# Patient Record
Sex: FEMALE | Race: WHITE | NOT HISPANIC OR LATINO | Employment: FULL TIME | URBAN - METROPOLITAN AREA
[De-identification: names, ages, dates, MRNs, and addresses within clinical notes are randomized per-mention and may not be internally consistent; named-entity substitution may affect disease eponyms.]

---

## 2019-02-22 ENCOUNTER — OFFICE VISIT (OUTPATIENT)
Dept: FAMILY MEDICINE | Facility: CLINIC | Age: 39
End: 2019-02-22
Payer: COMMERCIAL

## 2019-02-22 ENCOUNTER — ANCILLARY PROCEDURE (OUTPATIENT)
Dept: GENERAL RADIOLOGY | Facility: CLINIC | Age: 39
End: 2019-02-22
Attending: INTERNAL MEDICINE
Payer: COMMERCIAL

## 2019-02-22 VITALS
TEMPERATURE: 98.1 F | HEIGHT: 65 IN | HEART RATE: 71 BPM | BODY MASS INDEX: 43.82 KG/M2 | OXYGEN SATURATION: 98 % | SYSTOLIC BLOOD PRESSURE: 122 MMHG | DIASTOLIC BLOOD PRESSURE: 78 MMHG | WEIGHT: 263 LBS

## 2019-02-22 DIAGNOSIS — M25.572 PAIN IN JOINT INVOLVING ANKLE AND FOOT, LEFT: ICD-10-CM

## 2019-02-22 DIAGNOSIS — M25.572 PAIN IN JOINT INVOLVING ANKLE AND FOOT, LEFT: Primary | ICD-10-CM

## 2019-02-22 DIAGNOSIS — S93.402A SPRAIN OF LEFT ANKLE, UNSPECIFIED LIGAMENT, INITIAL ENCOUNTER: ICD-10-CM

## 2019-02-22 PROBLEM — E66.01 MORBID OBESITY (H): Status: ACTIVE | Noted: 2019-02-22

## 2019-02-22 PROCEDURE — 73610 X-RAY EXAM OF ANKLE: CPT | Mod: LT

## 2019-02-22 PROCEDURE — 99203 OFFICE O/P NEW LOW 30 MIN: CPT | Performed by: INTERNAL MEDICINE

## 2019-02-22 ASSESSMENT — MIFFLIN-ST. JEOR: SCORE: 1860.9

## 2019-02-22 NOTE — Clinical Note
Please abstract the following data from this visit with this patient into the appropriate field in Epic:Pap smear done on this date: 09/28/16 (approximately), by this group: [facility] Care Everywhere documentation

## 2019-02-22 NOTE — PROGRESS NOTES
SUBJECTIVE:   Anders Zepeda is a 38 year old female who presents to clinic today for the following health issues:      Left ankle pains    Sprained ankle in July of 2018 and never had evaluation. Pain has gotten better than it was but would like to get this checked out.        Duration:     Since: 7/2018           Specific cause: ankle sprained    Description:      Location of pain: left ankle     Character of pain: dull     Pain radiation: none    Intensity: moderate    History:      Pain interferes with job: yes     History of similar pain problems: no     Any previous MRI or X-rays: none     Therapies tried without relief: none    Alleviating factors:      Improved by: rest    Precipitating factors:    Worsened by: walking long distance    Accompanying Signs & Symptoms: none            Current Medications:     No current outpatient medications on file.         Allergies:    No Known Allergies         Past Medical History:     Past Medical History:   Diagnosis Date     Ankle sprain          Past Surgical History:   No past surgical history on file.      Family Medical History:     Family History   Problem Relation Age of Onset     Family History Negative Mother      Family History Negative Father          Social History:     Social History     Socioeconomic History     Marital status:      Spouse name: Not on file     Number of children: Not on file     Years of education: Not on file     Highest education level: Not on file   Occupational History     Not on file   Social Needs     Financial resource strain: Not on file     Food insecurity:     Worry: Not on file     Inability: Not on file     Transportation needs:     Medical: Not on file     Non-medical: Not on file   Tobacco Use     Smoking status: Current Every Day Smoker     Packs/day: 1.00     Years: 25.00     Pack years: 25.00     Smokeless tobacco: Never Used   Substance and Sexual Activity     Alcohol use: Not on file     Drug use: Not on file  "    Sexual activity: Not on file   Lifestyle     Physical activity:     Days per week: Not on file     Minutes per session: Not on file     Stress: Not on file   Relationships     Social connections:     Talks on phone: Not on file     Gets together: Not on file     Attends [employer] service: Not on file     Active member of club or organization: Not on file     Attends meetings of clubs or organizations: Not on file     Relationship status: Not on file     Intimate partner violence:     Fear of current or ex partner: Not on file     Emotionally abused: Not on file     Physically abused: Not on file     Forced sexual activity: Not on file   Other Topics Concern     Not on file   Social History Narrative     Not on file           Review of System:     Constitutional: Negative for fever or chills  Skin: Negative for rashes  Ears/Nose/Throat: Negative for nasal congestion, sore throat  Respiratory: No shortness of breath, dyspnea on exertion, cough, or hemoptysis  Cardiovascular: Negative for chest pain  Gastrointestinal: Negative for nausea, vomiting  Genitourinary: Negative for dysuria, hematuria  Musculoskeletal: positive for mechanical left ankle pains  Neurologic: Negative for headaches  Psychiatric: Negative for depression, anxiety  Hematologic/Lymphatic/Immunologic: Negative  Endocrine: Negative  Behavioral: Negative for tobacco use       Physical Exam:   /78 (BP Location: Right arm, Patient Position: Sitting, Cuff Size: Adult Large)   Pulse 71   Temp 98.1  F (36.7  C) (Oral)   Ht 1.638 m (5' 4.5\")   Wt 119.3 kg (263 lb)   SpO2 98%   BMI 44.45 kg/m      GENERAL: alert and no distress  EYES: eyes grossly normal to inspection, and conjunctivae and sclerae normal  HENT: Normocephalic atraumatic. Nose and mouth without ulcers or lesions  NECK: supple  RESP: lungs clear to auscultation   CV: regular rate and rhythm, normal S1 S2  LYMPH: no peripheral edema   ABDOMEN: nondistended  MS: left lateral ankle " pains noted  SKIN: no suspicious lesions or rashes  NEURO: Alert & Oriented x 3.   PSYCH: mentation appears normal, affect normal        Diagnostic Test Results:     Diagnostic Test Results:  Results for orders placed or performed in visit on 01/18/11   EKG 12 LEAD   Result Value Ref Range    Ventricular Rate 61 BPM    Atrial Rate 61 BPM    NM Interval 144 ms    QRS Duration 76 ms     ms    QTc 450 ms    P Axis 22 degrees    R AXIS 24 degrees    T Axis -8 degrees    Interpretation ECG       Sinus rhythm  Nonspecific ST and T wave abnormality  Abnormal ECG  No previous ECGs available       ASSESSMENT/PLAN:     (S93.402A) Sprain of left ankle, unspecified ligament, initial encounter  (M25.572) Pain in joint involving ankle and foot, left  (primary encounter diagnosis)  Comment: left ankle pains for several months after previous ankle injury  Plan: XR Ankle Left G/E 3 Views, PODIATRY/FOOT &         ANKLE SURGERY REFERRAL, in the meantime, the patient is advised to take OTC pain medication including low dose tylenol for pain relief going forward.      Follow Up Plan:     Patient is instructed to return to Internal Medicine clinic for follow-up visit in 1 month.        Elsa Omalley MD  Internal Medicine  [facility]

## 2019-03-01 ENCOUNTER — OFFICE VISIT (OUTPATIENT)
Dept: PODIATRY | Facility: CLINIC | Age: 39
End: 2019-03-01
Payer: COMMERCIAL

## 2019-03-01 VITALS
HEIGHT: 65 IN | HEART RATE: 81 BPM | SYSTOLIC BLOOD PRESSURE: 146 MMHG | WEIGHT: 266 LBS | BODY MASS INDEX: 44.32 KG/M2 | DIASTOLIC BLOOD PRESSURE: 61 MMHG

## 2019-03-01 DIAGNOSIS — S82.62XK CLOSED DISPLACED FRACTURE OF LATERAL MALLEOLUS OF LEFT FIBULA WITH NONUNION: Primary | ICD-10-CM

## 2019-03-01 PROCEDURE — 99203 OFFICE O/P NEW LOW 30 MIN: CPT | Performed by: PODIATRIST

## 2019-03-01 ASSESSMENT — MIFFLIN-ST. JEOR: SCORE: 1874.51

## 2019-03-01 NOTE — LETTER
"    3/1/2019         RE: Anders Zepeda  [address]  [facility] [address]        Dear Colleague,    Thank you for referring your patient, Anders Zepeda, to the [facility]. Please see a copy of my visit note below.    PATIENT HISTORY:  Anders Zepeda is a 38 year old female who presents to clinic for L ankle pain, injury.  Pt reports a sprain in July 2018.  Presents in regular shoes.  2-5/10 pain.  Worse with activity, better with rest.  She is convinced it was \"just a sprain,\" but it \"never got better.\"  Smoker.    I was requested to see this patient for this issue by Dr Omalley.    Review of Systems:  Patient denies fever, chills, rash, wound, numbness, weakness, heart burn, blood in stool, chest pain with activity, calf pain when walking, shortness of breath with activity, easy bleeding/bruising, swelling of ankles, excessive thirst, fatigue, depression, anxiety.  Patient admits to stiffness, limping, chronic cough.     PAST MEDICAL HISTORY:   Past Medical History:   Diagnosis Date     Ankle sprain         PAST SURGICAL HISTORY: .No pertinent past surgical history.     MEDICATIONS: No current outpatient medications on file.     ALLERGIES:  No Known Allergies     SOCIAL HISTORY:   Social History     Socioeconomic History     Marital status:      Spouse name: Not on file     Number of children: Not on file     Years of education: Not on file     Highest education level: Not on file   Occupational History     Not on file   Social Needs     Financial resource strain: Not on file     Food insecurity:     Worry: Not on file     Inability: Not on file     Transportation needs:     Medical: Not on file     Non-medical: Not on file   Tobacco Use     Smoking status: Current Every Day Smoker     Packs/day: 1.00     Years: 25.00     Pack years: 25.00     Smokeless tobacco: Never Used   Substance and Sexual Activity     Alcohol use: Not on file     Drug use: Not on file     Sexual activity: Not on file " "  Lifestyle     Physical activity:     Days per week: Not on file     Minutes per session: Not on file     Stress: Not on file   Relationships     Social connections:     Talks on phone: Not on file     Gets together: Not on file     Attends [employer] service: Not on file     Active member of club or organization: Not on file     Attends meetings of clubs or organizations: Not on file     Relationship status: Not on file     Intimate partner violence:     Fear of current or ex partner: Not on file     Emotionally abused: Not on file     Physically abused: Not on file     Forced sexual activity: Not on file   Other Topics Concern     Not on file   Social History Narrative     Not on file        FAMILY HISTORY:   Family History   Problem Relation Age of Onset     Family History Negative Mother      Family History Negative Father         EXAM:Vitals: /61   Pulse 81   Ht 1.638 m (5' 4.5\")   Wt 120.7 kg (266 lb)   BMI 44.95 kg/m     BMI= Body mass index is 44.95 kg/m .    General appearance: Patient is alert and fully cooperative with history & exam.  No sign of distress is noted during the visit.     Psychiatric: Affect is pleasant & appropriate.  Patient appears motivated to improve health.     Respiratory: Breathing is regular & unlabored while sitting.     HEENT: Hearing is intact to spoken word.  Speech is clear.  No gross evidence of visual impairment that would impact ambulation.     Dermatologic: Skin is intact to LLE without significant lesions, rash or abrasion.  No paronychia or evidence of soft tissue infection is noted.     Vascular: DP & PT pulses are intact & regular on the L.  Mild L ankle edema.  CFT and skin temperature are normal.    Neurologic: Lower extremity sensation is intact to light touch.  No evidence of weakness or contracture in the lower extremities.  No evidence of neuropathy.     Musculoskeletal: L ankle pain with palpation of distal fibula.  No medial ankle pain.  No syndesmotic " or proximal fibular pain.  No foot pain.   Patient is ambulatory without assistive device or brace.  No gross ankle deformity noted.  No foot or ankle joint effusion is noted.    XRs of L ankle reviewed with pt.  Displaced fx of distal fibula below level of ankle joint, nonunion.     ASSESSMENT: L fibula fracture with nonunion     PLAN:  Reviewed patient's chart in epic.  Discussed condition and treatment options including pros and cons.    Pt has a clear ankle fx on XR, subacute and corresponds to age of injury.  Evidence of displacement, nonunion.    Discussed treatment options including bracing, surgical repair.  Bone stim could be considered, but gapping is significant.  Pt is interested in discussing the option of surgery.    Will refer to my colleague Dr. Chiu, who repairs these kinds of injuries.    Pt placed in Aircast boot.  WBAT.  RICE.    Pt advised to remove boot several times a day and do leg raises, knee bends. It is also recommended that a thick-soled shoe be worn on the contralateral foot to offset any created leg length issue. CAM does not have to be worn at night.  We discussed immobilization and the risk of blood clot.  Patient to seek medical attention if calf swelling and/or pain, chest pain, shortness of breath.    Smoking cessation info given.    Satya Hannah DPM, FACFAS    Weight management plan: Patient was referred to their PCP to discuss a diet and exercise plan.  Anders to follow up with Primary Care provider regarding elevated blood pressure.      Again, thank you for allowing me to participate in the care of your patient.        Sincerely,        Satya Hannah DPM

## 2019-03-01 NOTE — PROGRESS NOTES
"PATIENT HISTORY:  Anders Zepeda is a 38 year old female who presents to clinic for L ankle pain, injury.  Pt reports a sprain in July 2018.  Presents in regular shoes.  2-5/10 pain.  Worse with activity, better with rest.  She is convinced it was \"just a sprain,\" but it \"never got better.\"  Smoker.    I was requested to see this patient for this issue by Dr Omalley.    Review of Systems:  Patient denies fever, chills, rash, wound, numbness, weakness, heart burn, blood in stool, chest pain with activity, calf pain when walking, shortness of breath with activity, easy bleeding/bruising, swelling of ankles, excessive thirst, fatigue, depression, anxiety.  Patient admits to stiffness, limping, chronic cough.     PAST MEDICAL HISTORY:   Past Medical History:   Diagnosis Date     Ankle sprain         PAST SURGICAL HISTORY: .No pertinent past surgical history.     MEDICATIONS: No current outpatient medications on file.     ALLERGIES:  No Known Allergies     SOCIAL HISTORY:   Social History     Socioeconomic History     Marital status:      Spouse name: Not on file     Number of children: Not on file     Years of education: Not on file     Highest education level: Not on file   Occupational History     Not on file   Social Needs     Financial resource strain: Not on file     Food insecurity:     Worry: Not on file     Inability: Not on file     Transportation needs:     Medical: Not on file     Non-medical: Not on file   Tobacco Use     Smoking status: Current Every Day Smoker     Packs/day: 1.00     Years: 25.00     Pack years: 25.00     Smokeless tobacco: Never Used   Substance and Sexual Activity     Alcohol use: Not on file     Drug use: Not on file     Sexual activity: Not on file   Lifestyle     Physical activity:     Days per week: Not on file     Minutes per session: Not on file     Stress: Not on file   Relationships     Social connections:     Talks on phone: Not on file     Gets together: Not on file     " "Attends [employer] service: Not on file     Active member of club or organization: Not on file     Attends meetings of clubs or organizations: Not on file     Relationship status: Not on file     Intimate partner violence:     Fear of current or ex partner: Not on file     Emotionally abused: Not on file     Physically abused: Not on file     Forced sexual activity: Not on file   Other Topics Concern     Not on file   Social History Narrative     Not on file        FAMILY HISTORY:   Family History   Problem Relation Age of Onset     Family History Negative Mother      Family History Negative Father         EXAM:Vitals: /61   Pulse 81   Ht 1.638 m (5' 4.5\")   Wt 120.7 kg (266 lb)   BMI 44.95 kg/m    BMI= Body mass index is 44.95 kg/m .    General appearance: Patient is alert and fully cooperative with history & exam.  No sign of distress is noted during the visit.     Psychiatric: Affect is pleasant & appropriate.  Patient appears motivated to improve health.     Respiratory: Breathing is regular & unlabored while sitting.     HEENT: Hearing is intact to spoken word.  Speech is clear.  No gross evidence of visual impairment that would impact ambulation.     Dermatologic: Skin is intact to LLE without significant lesions, rash or abrasion.  No paronychia or evidence of soft tissue infection is noted.     Vascular: DP & PT pulses are intact & regular on the L.  Mild L ankle edema.  CFT and skin temperature are normal.    Neurologic: Lower extremity sensation is intact to light touch.  No evidence of weakness or contracture in the lower extremities.  No evidence of neuropathy.     Musculoskeletal: L ankle pain with palpation of distal fibula.  No medial ankle pain.  No syndesmotic or proximal fibular pain.  No foot pain.   Patient is ambulatory without assistive device or brace.  No gross ankle deformity noted.  No foot or ankle joint effusion is noted.    XRs of L ankle reviewed with pt.  Displaced fx of " distal fibula below level of ankle joint, nonunion.     ASSESSMENT: L fibula fracture with nonunion     PLAN:  Reviewed patient's chart in epic.  Discussed condition and treatment options including pros and cons.    Pt has a clear ankle fx on XR, subacute and corresponds to age of injury.  Evidence of displacement, nonunion.    Discussed treatment options including bracing, surgical repair.  Bone stim could be considered, but gapping is significant.  Pt is interested in discussing the option of surgery.    Will refer to my colleague Dr. Chiu, who repairs these kinds of injuries.    Pt placed in Aircast boot.  WBAT.  RICE.    Pt advised to remove boot several times a day and do leg raises, knee bends. It is also recommended that a thick-soled shoe be worn on the contralateral foot to offset any created leg length issue. CAM does not have to be worn at night.  We discussed immobilization and the risk of blood clot.  Patient to seek medical attention if calf swelling and/or pain, chest pain, shortness of breath.    Smoking cessation info given.    Satya Hannah DPM, FACFAS    Weight management plan: Patient was referred to their PCP to discuss a diet and exercise plan.  Anders to follow up with Primary Care provider regarding elevated blood pressure.

## 2019-03-01 NOTE — Clinical Note
Linus,This pt should be seeing you this week for L distal fibula nonunion.  She would like to discuss surgery.Thank you,-Ag

## 2019-03-01 NOTE — PATIENT INSTRUCTIONS
Follow up with Dr. Chiu next Thursday at [facility]      ANKLE FRACTURES  A fracture is a partial or complete break in a bone. Fractures in the ankle can range from the less serious avulsion injuries (small pieces of bone that have been pulled off) to severe shattering-type breaks of the tibia, fibula or both.  Ankle fractures are common injuries most often caused by the ankle rolling inward or outward. Many people mistake an ankle fracture for an ankle sprain, but they are quite different and therefore require an accurate and early diagnosis. They sometimes occur simultaneously.    SYMPTOMS  An ankle fracture is accompanied by one or all of these symptoms:  Pain at the site of the fracture, which in some cases can extend from the foot to the knee.   Significant swelling, which may occur along the length of the leg or may be more localized.   Blisters may occur over the fracture site. These should be promptly treated by a foot and ankle surgeon.   Bruising that develops soon after the injury.   Inability to walk; however, it is possible to walk with less severe breaks, so never rely on walking as a test of whether or not a bone has been fractured.   Change in the appearance of the ankle--it will look different from the other ankle.   Bone protruding through the skin--a sign that immediate care is needed. Fractures that ewing the skin require immediate attention because they can lead to severe infection and prolonged recovery     DIAGNOSIS  Following an ankle injury, it is important to have the ankle evaluated by a foot and ankle surgeon for proper diagnosis and treatment. If you are unable to do so right away, go to the emergency room and then follow up with a foot and ankle surgeon as soon as possible for a more thorough assessment.  The affected limb will be examined by the foot and ankle surgeon who will touch specific areas to evaluate the injury. In addition, the surgeon may order x-rays and other imaging  studies, as necessary.    NONSURGICAL TREATMENT  Treatment of ankle fractures depends on the type and severity of the injury. At first, the foot and ankle surgeon will want you to follow the RICE protocol:  Rest: Stay off the injured ankle. Walking may cause further injury.   Ice: Apply an ice pack to the injured area, placing a thin towel between the ice and the skin. Use ice for 20 minutes and then wait at least 40 minutes before icing again.   Compression: An elastic wrap should be used to control swelling.   Elevation: The ankle should be raised slightly above the level of your heart to reduce swelling.    Additional treatment options include:  Immobilization. Certain fractures are treated by protecting and restricting the ankle and foot in a cast or splint. This allows the bone to heal.   Prescription medications. To help relieve the pain, the surgeon may prescribe pain medications or anti-inflammatory drugs.    SURGICAL TREATMENT  For some ankle fractures, surgery is needed to repair the fracture and other soft tissue-related injuries, if present. The foot and ankle surgeon will select the procedure that is appropriate for your injury.    FOLLOW-UP  It is important to follow your surgeon s instructions after treatment. Failure to do so can lead to infection, deformity, arthritis and chronic pain.      BLOOD CLOT PREVENTION - WEARING A CAST BOOT    Do not drive with the CAM boot  Do not wear during long-distance travel: long car rides / plane trips  Wear the boot when moving, regardless of your weight-bearing status    Any brace that extends up to the knee can increase your chance of developing a blood clot. Blood clots generally occur in the large veins of your calf, thigh, or abdomen. A blood clot may form when blood is stagnant in the veins while your leg is immobilized in the brace. Malini and relaxing the calf muscles by moving the ankle is the best method to avoid stagnant blood. Clarify with your  doctor if you should be doing this as this may not be recommended for certain tendon surgeries.    Blood clots or deep venous thrombosis (DVT) can be life threatening if a portion of the clot breaks away and travels through the veins to your lungs. This is called a pulmonary embolism (PE) which can result in death.    Symptoms of a DVT may include swelling, tenderness, a warm feeling or redness in the leg. DVT can occur in both legs, even if only one side is in a brace. If you have these symptoms, you should call your doctor immediately or go to the Emergency Room to have your leg scanned.     Symptoms of a pulmonary embolism (PE) include chest pain, shortness of breath or the need to breath rapidly that is not associated with exercise, difficulty breathing, rapid heart rate, coughing or coughing up blood, or a feeling of passing out. Chest pain can range from mild to knife-like pain while taking a deep breath. Pulmonary embolism can occur without any symptoms whatsoever. You need to be evaluated in a hospital emergency room immediately if you have symptoms of a PE. Please call 911 as PE is a life-threatening emergency.    Be certain that you understand how much ankle range of motion is allowed. Your foot and ankle problem is being immobilized by the brace, yet we do not want you to develop a blood clot. The velcro strap braces are intended to be removed for periodic exercise of the ankle. Your doctor will tell you if it is okay to do this depending on the type of surgery or injury you had.    Your own personal risk of developing a DVT or PE is dependent on many factors. A personal or family history of previous blood clot or a clotting disorder (such as thrombophilia) puts you at risk. Other risk factors include history of cancer, current use of estrogen medications (such as birth control pills or post menopausal medications), recent trauma or fracture, diabetes, recent heart attack, COPD, heart failure, pregnancy,  obesity, advanced age, smoking, etc. Please make sure your doctor is aware if you have any of these risk factors.        Body Mass Index (BMI)  Many things can cause foot and ankle problems. Foot structure, activity level, foot mechanics and injuries are common causes of pain.  One very important issue that often goes unmentioned is body weight. Extra weight can cause increased stress on muscles, ligaments, bones and tendons. Sometimes just a few extra pounds is all it takes to put one over her/his threshold. Without reducing that stress, it can be difficult to alleviate pain.   Some people are uncomfortable addressing this issue, but we feel it is important for you to think about it. As Foot & Ankle specialists, our job is addressing the lower extremity problem and possible causes.   Regarding extra body weight, we encourage patients to discuss diet and weight management plans with their primary care doctors. It is this team approach that gives you the best opportunity for pain relief and getting you back on your feet.     Anders to follow up with Primary Care provider regarding elevated blood pressure.      SMOKING CESSATION  What's in cigarette smoke? - Cigarette smoke contains over 4,000 chemicals. Nicotine is one of the main ingredients which is an insecticide/herbicide. It is poisonous to our nervous system, increases blood clotting risk, and decreases the body's defenses to fight off infection. Another chemical is Carbon Monoxide is an asphyxiating gas that permanently binds to blood cells and blocks the transport of oxygen. This leads to tissue death and decreases your metabolism. Tar is a chemical that coats your lungs and trachea which impairs new oxygen coming in and carbon dioxide getting out of your body.   How does smoking impact surgery? - Smoking is particularly hazardous with regards to surgery. Surgery puts stress on the body and a smoker's body is already under strain from these chemicals.  Putting the two together, especially for an elective surgery, could be a recipe for disaster. Smoking before and after surgery increases your risk of heart problems, slow wound healing, delayed bone healing, blood clots, wound infection and anesthesia complications.   What are the benefits of quitting? - In 20 minutes your blood pressure will drop back down to normal. In 8 hours the carbon monoxide (a toxic gas) levels in your blood stream will drop by half, and oxygen levels will return to normal. In 48 hours your chance of having a heart attack will have decreased. All nicotine will have left your body. Your sense of taste and smell will return to a normal level. In 72 hours your bronchial tubes will relax, and your energy levels will increase. In 2 weeks your circulation will increase, and it will continue to improve for the next 10 weeks.    Recommendations for elective surgery - Ideally, patients should quit smoking 8 weeks before and at least 2 weeks after elective surgery in order to avoid complications. Simply cutting back on the amount of cigarettes smoked per day does not offer any benefit or decrease the risk of poor wound healing, heart problems, and infection. Smokers should also start taking Vitamin C and B for two weeks before surgery and two weeks after surgery.    Ways to Stop Smokin. Nicotine patches, lozenges, or gum  2. Support Groups  3. Medications (see below)    List of Medications:  1. Varenicline Tartrate (CHANTIX)   2. Bupropion HCL (WELLBUTRIN, ZYBAN) - note: make sure Wellbutrin is for smoking cessation and not other issues   3. Nicotine Patch (NICODERM)   4. Nicotine Inhaler (NICOTROL)   5. Nicotine Gum Nicotine Polacrilex   6. Nicotine Lozenge: Nicotine Polacrilex (COMMIT)   * [place] offers a smoking support group as well!  Please visit: [email or web address]  If you are interested in these, ask about getting a prescription or talk to your primary care doctor  about what may be the best way for you to quit.            Statement Selected

## 2019-03-07 ENCOUNTER — TELEPHONE (OUTPATIENT)
Dept: PODIATRY | Facility: CLINIC | Age: 39
End: 2019-03-07

## 2019-03-07 ENCOUNTER — ANCILLARY PROCEDURE (OUTPATIENT)
Dept: GENERAL RADIOLOGY | Facility: CLINIC | Age: 39
End: 2019-03-07
Attending: PODIATRIST
Payer: COMMERCIAL

## 2019-03-07 ENCOUNTER — OFFICE VISIT (OUTPATIENT)
Dept: PODIATRY | Facility: CLINIC | Age: 39
End: 2019-03-07
Payer: COMMERCIAL

## 2019-03-07 VITALS
SYSTOLIC BLOOD PRESSURE: 128 MMHG | HEIGHT: 65 IN | WEIGHT: 266 LBS | DIASTOLIC BLOOD PRESSURE: 76 MMHG | BODY MASS INDEX: 44.32 KG/M2

## 2019-03-07 DIAGNOSIS — S82.892K CLOSED FRACTURE OF LEFT ANKLE WITH NONUNION, SUBSEQUENT ENCOUNTER: Primary | ICD-10-CM

## 2019-03-07 DIAGNOSIS — S82.62XK CLOSED DISPLACED FRACTURE OF LATERAL MALLEOLUS OF LEFT FIBULA WITH NONUNION, SUBSEQUENT ENCOUNTER: Primary | ICD-10-CM

## 2019-03-07 DIAGNOSIS — S82.892K CLOSED FRACTURE OF LEFT ANKLE WITH NONUNION, SUBSEQUENT ENCOUNTER: ICD-10-CM

## 2019-03-07 PROCEDURE — 36415 COLL VENOUS BLD VENIPUNCTURE: CPT | Performed by: PODIATRIST

## 2019-03-07 PROCEDURE — 82306 VITAMIN D 25 HYDROXY: CPT | Performed by: PODIATRIST

## 2019-03-07 PROCEDURE — 99214 OFFICE O/P EST MOD 30 MIN: CPT | Performed by: PODIATRIST

## 2019-03-07 PROCEDURE — 73610 X-RAY EXAM OF ANKLE: CPT | Mod: RT

## 2019-03-07 ASSESSMENT — MIFFLIN-ST. JEOR: SCORE: 1874.51

## 2019-03-07 NOTE — PROGRESS NOTES
"Foot & Ankle Surgery   March 7, 2019    S:  Pt is seen today for evaluation of left ankle pain 2/2 to distal fibular fracture nonunion.  Original injury July 2018, stepped off a curb but doesn't recall a specific sprain/injury.  .xrays with Dr Omalley 2/22/19 showed distal fibular fracture nonunion and she was seen by Dr Hannah 3/1/19, who referred her to us for surgical intervention.  She's wearing the walking boot and has no pain but outside the boot continues to have pain.  She also describes feelings of instability in the ankle.  Smoker x 20+ years, 1 ppd.      Vitals:    03/07/19 0848   BP: 128/76   Weight: 120.7 kg (266 lb)   Height: 1.638 m (5' 4.5\")   '      ROS - Pos for CC.  Patient denies current nausea, vomiting, chills, fevers, belly pain, calf pain, chest pain or SOB.  Complete remainder of ROS it otherwise neg.      PE:  Gen:   No apparent distress  Eye:    Visual scanning without deficit  Ear:    Response to auditory stimuli wnl  Lung:    Non-labored breathing on RA noted  Abd:    NTND per patient report  Lymph:    Neg for pitting/non-pitting edema BLE  Vasc:    Pulses palpable, CFT minimally delayed  Neuro:    Light touch sensation intact to all sensory nerve distributions without paresthesias  Derm:    Neg for nodules, lesions or ulcerations  MSK:    Left ankle - very tender medial and anterior gutters.  No POP ATFL or pain/instabiilty with anterior drawer.  She does have some discomfort at CFL but no pain/instability with stressing.  Points to course of peroneal tendons posterior to fibula as another area of pain.  Tender at distal fibula..  Calf:    Neg for redness, swelling or tenderness      Imaging:  xrays L ankle 2/22/19 - IMPRESSION: Distracted fracture is noted through the distal fibula,  transverse, minimally displaced. This is just below the level of the  ankle mortise with degree of distraction measuring up to 6 mm. No  interval healing. Margins appear fairly well corticated " indicating  chronicity, given the clinical history, may relate to an injury in  July 2018. Ankle mortise appears intact. Mild lateral soft tissue  swelling.    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  Continued left ankle pain associated with distal fibular fracture nonunion from July 2018  -personally reviewed imaging  -xrays R ankle to assess anatomy  -MRI left ankle to assess ligaments and peroneal tendons  -discussed ankle arthroscopy to assess  Cause of intra-articular pain and to clean out joint  -discussed ORIF fibula with grafting  -consider peroneal exploration if MRI indicates  -Vit D draw today  -surg naveen handout dispensed    Follow up:  Prn for surg consult or sooner with acute issues    Job duties; time off work - ; 2 weeks.  Has seated duties  Smoking history - 1 ppd x 20+ years; advised to speak with PCP about  Vit D - draw today  Diabetic/A1c - neg  Hemoglobin - draw prior to surgery  Clot history - neg  Allergies to surgical implants/suture - neg  Allergies/issues with narcotics - neg    Procedure(s):  1.  Left ankle arthroscopy, distal fibular fracture nonunion repair and possible ligament/tendon repair  Consent: above  Diagnosis:  Distal fibular fracture nonunion  Equipment/Vendor: Arthrex ankle scope and distal fibular plate set.  PRP spin-down system.                    Body mass index is 44.95 kg/m .           Linus Chiu DPM FACFAS FACFAOM  Podiatric Foot & Ankle Surgeon  [facility]  [phone]

## 2019-03-07 NOTE — PATIENT INSTRUCTIONS
"Thank you for choosing [facility] / [facility]!    DR. LAWS'S CLINIC LOCATIONS:   MONDAY - [place] TUESDAY - [place]   [address] [facility]  [address]   [place] [address] [place] [address]   [phone]       THURSDAY AM - [place] THURSDAY PM - [place]   [address]   [place] [address] [place] [address]   [phone]       FRIDAY AM - [place] SET UP SURGERY: [phone]   [address] APPOINTMENTS: [phone]   [place] [address] BILLING QUESTIONS: [phone] FAX NUMBER: [phone]     Follow Up: as needed        Surgical planning.  If you have decided to have surgery, follow these few steps to get the procedure scheduled and to have the proper paperwork filled out.  If you are unsure about surgery, or would like to sit down and further discuss your issue and treatment options, please make a clinic appointment with Dr Laws.    1.  Pick the date that you would like to have surgery.  Keep in mind that you will likely need at least 2 weeks off after the procedure for proper rest and healing.    2.  Call the surgery scheduling line at [phone] to get the procedure scheduled.  My , Paulina, will assist you in getting surgery set up.      3.  Make an appointment to see Dr Laws within 1-2 weeks of the date of surgery for your pre-operative consult.  When making the appointment, say \"I need to make a 30 minute surgical consult with Dr Laws\".  All the paperwork will be ready for you during the visit.  It is recommended that you bring a spouse, family member or friend with you.  There will be lots of information presented.  It can be overwhelming, and it is better to have someone there to help sort out the details.    4.  Make an appointment to see your Primary Care Physician within 4 weeks of the date of surgery for your \"Pre-operative History and " "Physical\".  This is done to make sure you are medically healthy to undergo surgery.        If you have any post-operative questions regarding your procedure, call our triage nurses at [phone].                          FYI: Body Mass Index (BMI)  Many things can cause foot and ankle problems. Foot structure, activity level, foot mechanics and injuries are common causes of pain. One very important issue that often goes unmentioned, is body weight. Extra weight can cause increased stress on muscles, ligaments, bones and tendons. Sometimes just a few extra pounds is all it takes to put one over her/his threshold. Without reducing that stress, it can be difficult to alleviate pain. Some people are uncomfortable addressing this issue, but we feel it is important for you to think about it. As Foot &  Ankle specialists, our job is addressing the lower extremity problem and possible causes. Regarding extra body weight, we encourage patients to discuss diet and weight management plans with their primary care doctors. It is this team approach that gives you the best opportunity for pain relief and getting you back on your feet.      SMOKING CESSATION  What's in cigarette smoke? - Cigarette smoke contains over 4,000 chemicals. Nicotine is one of the main ingredients which is an insecticide/herbicide. It is poisonous to our nervous system, increases blood clotting risk, and decreases the body's defenses to fight off infection. Another chemical is Carbon Monoxide is an asphyxiating gas that permanently binds to blood cells and blocks the transport of oxygen. This leads to tissue death and decreases your metabolism. Tar is a chemical that coats your lungs and trachea which impairs new oxygen coming in and carbon dioxide getting out of your body.   How does smoking impact surgery? - Smoking is particularly hazardous with regards to surgery. Surgery puts stress on the body and a smoker's body is already under strain from these " chemicals. Putting the two together, especially for an elective surgery, could be a recipe for disaster. Smoking before and after surgery increases your risk of heart problems, slow wound healing, delayed bone healing, blood clots, wound infection and anesthesia complications.   What are the benefits of quitting? - In 20 minutes your blood pressure will drop back down to normal. In 8 hours the carbon monoxide (a toxic gas) levels in your blood stream will drop by half, and oxygen levels will return to normal. In 48 hours your chance of having a heart attack will have decreased. All nicotine will have left your body. Your sense of taste and smell will return to a normal level. In 72 hours your bronchial tubes will relax, and your energy levels will increase. In 2 weeks your circulation will increase, and it will continue to improve for the next 10 weeks.    Recommendations for elective surgery - Ideally, patients should quit smoking 8 weeks before and at least 2 weeks after elective surgery in order to avoid complications. Simply cutting back on the amount of cigarettes smoked per day does not offer any benefit or decrease the risk of poor wound healing, heart problems, and infection. Smokers should also start taking Vitamin C and B for two weeks before surgery and two weeks after surgery.    Ways to Stop Smokin. Nicotine patches, lozenges, or gum  2. Support Groups  3. Medications (see below)    List of Medications:  1. Varenicline Tartrate (CHANTIX)   2. Bupropion HCL (WELLBUTRIN, ZYBAN) - note: make sure Wellbutrin is for smoking cessation and not other issues   3. Nicotine Patch (NICODERM)   4. Nicotine Inhaler (NICOTROL)   5. Nicotine Gum Nicotine Polacrilex   6. Nicotine Lozenge: Nicotine Polacrilex (COMMIT)   * [place] offers a smoking support group as well!  Please visit: [email or web address]  If you are interested in these, ask about getting a prescription or talk to your primary  care doctor about what may be the best way for you to quit.

## 2019-03-07 NOTE — TELEPHONE ENCOUNTER
"Orders placed for \"left ankle arthroscopy, open reduction internal fixation distal fibular fracture nonunion, with possible tendon/ligament repair\".    Linus Chiu DPM FACFAS FACFAOM  Podiatric Foot & Ankle Surgeon  [facility]  [phone]      "

## 2019-03-07 NOTE — TELEPHONE ENCOUNTER
----- Message from Paulina Rivera sent at 3/7/2019 11:29 AM CST -----  Regarding: surgical order  Dr. Chiu,     Patient called and is ready to schedule surgery.  We are looking at 4/17 or 4/24.      Please place surgical order for the left ankle.    Paulina

## 2019-03-08 ENCOUNTER — TELEPHONE (OUTPATIENT)
Dept: PODIATRY | Facility: CLINIC | Age: 39
End: 2019-03-08

## 2019-03-08 LAB — DEPRECATED CALCIDIOL+CALCIFEROL SERPL-MC: 6 UG/L (ref 20–75)

## 2019-03-08 NOTE — TELEPHONE ENCOUNTER
Scheduled surgery.     Type of surgery: Left ORIF distal fibular fracture nonunion, with possible tendon/ligament repair, CPT 70542 and 38437  Location of surgery: [facility]  Date and time of surgery: 4/17/19 @ 1130am  Surgeon: Aram  Pre-Op Appt Date: 4/4/19, Dr. Omalley  Post-Op Appt Date: 4/25/19   Packet sent out: Yes  Pre-cert/Authorization completed:  Not Applicable  Date: 3/8/19      Paulina Rivera, Surgery Scheduler

## 2019-03-11 ENCOUNTER — TELEPHONE (OUTPATIENT)
Dept: PODIATRY | Facility: CLINIC | Age: 39
End: 2019-03-11

## 2019-03-11 NOTE — TELEPHONE ENCOUNTER
----- Message from Paulina Rivera sent at 3/8/2019  2:14 PM CST -----  Regarding: MRI order?  Linus,     Patient called asking when someone will contact her to schedule the MRI.  I didn't see an order, so I'm ordering if 1) you forgot to sign the order or 2) you didn't get a chance to put in the order yet.     I notified the patient that someone from Radiology/Imaging will contact her once the order is placed.      Her surgery is not until April 17th. Will that be ok with her type of surgery?  Your schedule is full.    Paulina

## 2019-03-13 ENCOUNTER — TELEPHONE (OUTPATIENT)
Dept: PODIATRY | Facility: CLINIC | Age: 39
End: 2019-03-13

## 2019-03-13 NOTE — TELEPHONE ENCOUNTER
Reason for Call:  MRI    Detailed comments: says she has not heard from anyone about getting her scheduled for a MRI  And her Vitamin D was very low per the test results  asking what you want to do about that    Phone Number Patient can be reached at: Home number on file [phone] (home)    Best Time: anytime    Can we leave a detailed message on this number? YES    Call taken on 3/13/2019 at 11:45 AM by Cipriano Cancino

## 2019-03-13 NOTE — TELEPHONE ENCOUNTER
Called and gave contact info below to schedule.   [facility]:       [phone]      [address] [place] [address]

## 2019-03-15 ENCOUNTER — TELEPHONE (OUTPATIENT)
Dept: PODIATRY | Facility: CLINIC | Age: 39
End: 2019-03-15

## 2019-03-15 NOTE — TELEPHONE ENCOUNTER
Reason for Call:  Form, our goal is to have forms completed with 72 hours, however, some forms may require a visit or additional information.    Type of letter, form or note:  FMLA    Who is the form from?: Patient    Where did the form come from: Patient or family brought in       What clinic location was the form placed at?: [facility]    Where the form was placed: in 's In Box      What number is listed as a contact on the form?: [phone]       Additional comments: none    Call taken on 3/15/2019 at 3:14 PM by Cipriano Cancino

## 2019-03-20 NOTE — TELEPHONE ENCOUNTER
"Pt has questions regarding getting a new walking boot due to work policy regarding \"open toed boots\"    "

## 2019-03-21 NOTE — TELEPHONE ENCOUNTER
Informed pt we will fax ther [place] paperwork tomorrow AM and to look online or at a local medical supply store for a tall closed toe walking boot as we do not have any through [facility].

## 2019-03-22 ENCOUNTER — HOSPITAL ENCOUNTER (OUTPATIENT)
Dept: MRI IMAGING | Facility: CLINIC | Age: 39
Discharge: HOME | End: 2019-03-22
Attending: PODIATRIST | Admitting: PODIATRIST
Payer: COMMERCIAL

## 2019-03-22 DIAGNOSIS — S82.892K CLOSED FRACTURE OF LEFT ANKLE WITH NONUNION, SUBSEQUENT ENCOUNTER: ICD-10-CM

## 2019-03-22 PROCEDURE — 73721 MRI JNT OF LWR EXTRE W/O DYE: CPT | Mod: LT

## 2019-03-22 NOTE — TELEPHONE ENCOUNTER
Forms signed, will fax today.    Lin[facility]iu DPM [facility] FACFA  Podiatric Foot & Ankle Surgeon  [facility]  [phone]

## 2019-04-04 ENCOUNTER — OFFICE VISIT (OUTPATIENT)
Dept: FAMILY MEDICINE | Facility: CLINIC | Age: 39
End: 2019-04-04
Payer: COMMERCIAL

## 2019-04-04 ENCOUNTER — OFFICE VISIT (OUTPATIENT)
Dept: PODIATRY | Facility: CLINIC | Age: 39
End: 2019-04-04
Payer: COMMERCIAL

## 2019-04-04 VITALS
OXYGEN SATURATION: 98 % | HEIGHT: 65 IN | WEIGHT: 257.7 LBS | HEART RATE: 74 BPM | DIASTOLIC BLOOD PRESSURE: 87 MMHG | BODY MASS INDEX: 42.93 KG/M2 | SYSTOLIC BLOOD PRESSURE: 144 MMHG | TEMPERATURE: 97.4 F

## 2019-04-04 VITALS
HEIGHT: 65 IN | BODY MASS INDEX: 44.32 KG/M2 | WEIGHT: 266 LBS | DIASTOLIC BLOOD PRESSURE: 76 MMHG | SYSTOLIC BLOOD PRESSURE: 128 MMHG

## 2019-04-04 DIAGNOSIS — Z98.890 S/P FOOT SURGERY, LEFT: Primary | ICD-10-CM

## 2019-04-04 DIAGNOSIS — F17.200 TOBACCO DEPENDENCE SYNDROME: ICD-10-CM

## 2019-04-04 DIAGNOSIS — Z01.818 PREOP GENERAL PHYSICAL EXAM: Primary | ICD-10-CM

## 2019-04-04 DIAGNOSIS — S82.832S CLOSED FRACTURE OF DISTAL END OF LEFT FIBULA, UNSPECIFIED FRACTURE MORPHOLOGY, SEQUELA: ICD-10-CM

## 2019-04-04 LAB
HGB BLD-MCNC: 10.3 G/DL (ref 11.7–15.7)
PLATELET # BLD AUTO: 319 10E9/L (ref 150–450)

## 2019-04-04 PROCEDURE — 85018 HEMOGLOBIN: CPT | Performed by: INTERNAL MEDICINE

## 2019-04-04 PROCEDURE — 99214 OFFICE O/P EST MOD 30 MIN: CPT | Performed by: PODIATRIST

## 2019-04-04 PROCEDURE — 36415 COLL VENOUS BLD VENIPUNCTURE: CPT | Performed by: INTERNAL MEDICINE

## 2019-04-04 PROCEDURE — 85049 AUTOMATED PLATELET COUNT: CPT | Performed by: INTERNAL MEDICINE

## 2019-04-04 PROCEDURE — 99214 OFFICE O/P EST MOD 30 MIN: CPT | Performed by: INTERNAL MEDICINE

## 2019-04-04 RX ORDER — NICOTINE 21 MG/24HR
1 PATCH, TRANSDERMAL 24 HOURS TRANSDERMAL EVERY 24 HOURS
Qty: 30 PATCH | Refills: 11 | Status: SHIPPED | OUTPATIENT
Start: 2019-04-04 | End: 2019-08-08

## 2019-04-04 RX ORDER — POLYETHYLENE GLYCOL 3350 17 G
2 POWDER IN PACKET (EA) ORAL
Qty: 108 LOZENGE | Refills: 11 | Status: SHIPPED | OUTPATIENT
Start: 2019-04-04 | End: 2019-08-08

## 2019-04-04 ASSESSMENT — MIFFLIN-ST. JEOR
SCORE: 1831.86
SCORE: 1869.51

## 2019-04-04 NOTE — Clinical Note
Surjit Mahajan saw Veronica today for consultation regarding her ankle pain.  We reviewed the surgery,including ankle arthroscopy, fibular fracture nonunion repair, peroneal tendon evaluation/repair and repair of the calcaneofibular ligament.  We reviewed the procedure, post-op course and risks, and she was given a copy of her post-op orders.Mark

## 2019-04-04 NOTE — PATIENT INSTRUCTIONS
Thank you for choosing [facility]!    DR. LAWS'S CLINIC LOCATIONS:   MONDAY - [place] TUESDAY - [place]   [address] [facility]  [address]   [place] [address] [place] [address]   [phone]       THURSDAY AM - [place] THURSDAY PM - [place]   [address]   [place] [address] [place] [address]   [phone]       FRIDAY AM - [place] SET UP SURGERY: [phone] [address] APPOINTMENTS: [phone]   [place] [address] BILLING QUESTIONS: [phone] FAX NUMBER: [phone]         FOOT & ANKLE SURGICAL RISKS  Undergoing surgical procedure involves a certain amount of risks. Risks of complications vary, depending on the complexity of the surgery and how you take care of the surgical area during the healing process. Complications can range from minor infection to death. Some complications are temporary while others will be permanent. Your surgeon weighs the risk of complications versus the potential benefit of undergoing the procedure. You need to consider your tolerance for unexpected problems as you decide whether to undergo surgery.    Foot and ankle surgery involves cutting skin, bone, ligaments, blood vessels, and joints. These structures heal well but not without consequence. Any break in the skin can lead to infection. A deep infection can involve bone or joints, which can be devastating. Deep infection can lead to further surgeries such as amputation or could spread to other parts of the body. Most infections are minor and easily treated with oral antibiotics. Infections are often times from bacteria already present on your skin. Proper care of the surgical site is an essential component of avoiding infection. Do not get the bandage wet and take proper care of external pins to avoid these complications.    Joint stiffness is inherent to any foot or ankle  surgery. Joint surgery is a major component of reconstructive foot and ankle procedures. The ligaments and tissue around the joint are released for exposure and/or correction of alignment. Scar tissue limits joint mobility. This can lead to hypersensitivity to touch, pain, problems wearing shoes, and need for revision surgery.    Bones do not always heal after surgery. Poor healing after a bone cut of joint fusion can lead to an extended period of casting, bone stimulators, or repeat surgery. A surgical nonunion is when bones do not heal properly. Smoking will almost always increase your risks of having postoperative complications. So if you smoke, quit now.    Bone grafting is sometimes necessary during the original or repeat surgery. Bone is sometimes taken from other parts of the body, freeze-dried cadaveric bone, or synthetic bone grafts may be used as needed.    BLOOD CLOT PREVENTION & EDUCATION  Foot and ankle surgery can lead to blood clots in the large veins of your legs. This is called a deep venous thrombosis or DVT. A DVT can be life threatening if a portion of the clot breaks away and travels to the lungs. A clot in the lungs is called a pulmonary embolism or PE.    Your risk of developing a DVT is dependent on many factors. Risk factors associated with surgery include the type of surgery you are having (foot and ankle surgery is considered a much lower risk that knee, hip, or abdominal surgery), how long you are in a cast/boot, restricted ambulation, inability to move the ankle, and if you are hospitalized after surgery.    A number of medical conditions also increase your risk of DVT, including diabetes, use of estrogen medications such as birth control pills or postmenopausal medications, obesity, pregnancy, heart failure, cancer, etc.    Other risk factors include heavy smoking, advanced age (over 60 years old, but even those over 40 have increased risk), family of personal history of blood clots or  clotting disorders. Symptoms of a DVT in the leg may include swelling, tenderness, a warm feeling or redness. A DVT can occur in both legs even if only one side is being treated. If you have these symptoms, call your doctor immediately.    Symptoms of a PE include chest pain, shortness of breath or the need to breath rapidly that is not associated with exercise, difficulty breathing, rapid heart rate, a feeling of passing out, and coughing or coughing up blood. A PE is an emergency so you need to be evaluated in the ER immediately if any of these symptoms occur. You could die from a PE. Call 911 right away. PE and DVT can occur without any symptoms in the leg and chest.    Prevention of DVT and PE is important. Your doctor may apply various types of compression to your legs before and after surgery. In addition, high risk patients may be place on a short course of blood thinning medication after surgery.    You can reduce your risk for DVT after surgery by getting up and moving/crawling/crutching around the house once or twice each hour while awake in the first few weeks. Seated range of motion exercises of your ankle and leg may also help. Moving your legs keeps blood flowing through your leg veins and reduced any pooling of blood that may clot. Be sure to follow your doctor's instructions on elevation and weight bearing restrictions.      SURGICAL DRESSING  Your surgical dressing is a sterile dressing and should be left in place until removed by your doctor or their assistant at your first postop visit in clinic. Keep the dressing dry by covering it with a plastic bag during showers, taking baths with your surgical foot hanging outside of the tub, or by sponge bathing. If the dressing does become wet or dirty, call the clinic as it most likely needs to be changed. Do not change it yourself unless told otherwise by your surgeon. The safest plan is to wait to shower for three days after surgery. So take a long  shower the morning of surgery.    Do not wear regular shoes with a surgical bandage and/or external pins in your foot. Wear loose fitting clothing that will easily slide over the dressing. Do not cover your surgical foot with blankets as it can contaminate the dressing. Also, remember to keep it away from your pets as they may try to chew on it.    If your surgeon places external pins in your foot, you must keep your foot dry until the pins are removed at six to eight weeks after surgery. Pins should be covered for protection but still examine the pin sites for loosening, movement, infection, or drainage whenever possible. Do not apply ointment on the pin sites and never push a loose pin back into place.    PRESURGICAL MEDICATION RECOMMENDATIONS  Certain prescription, over-the-counter and herbal medications interfere with healing after an operation. The main concern related to medications that increase bleeding at the surgical site. Excess blood under the incision results in poor wound healing, excess pain, increased scarring, and a higher risk of infection.    Some medications slow the healing process of bone. Medications can also interfere with anesthesia drugs that keep you asleep during the operation. It is important to ensure that these medications are out of your system prior to the operation. The list below details a number of medications that are of concern. Pay special attention to how long you should avoid these medications prior to surgery. Please note that this list is not complete. You should ask your surgeon, pharmacist, or primary care physician if you are uncertain about other medications. Any herbal supplement not listed should be discontinued at least one week prior to surgery.    Aspirin: stop one week prior and may restart the day after surgery. This medication promotes bleeding.  Motrin/Ibuprofen/Aleve/Advil/NSAIDS: stop one week prior to surgery. These medications promote bleeding and may delay  bone healing. Avoid these medications at least six weeks postop.  Coumadin: your primary care doctor will manage your coumadin in relation to surgery.  Enbrel: stop two weeks prior and may restart two weeks after. It may affect soft tissue healing and increase infection risk.  Remicade: stop eight to twelve weeks prior and may restart two weeks after. It can affect soft tissue healing and increase infection risk.  Humera: stop four weeks prior and may restart two weeks after. It can affect soft tissue healing and increase infection risk.  Methotrexate: stop taking on dose prior to surgery. It may be restarted when the wound is healing well.  Kava: stop at least one day prior and may restart one day after. It can cause increased sedative effects of anesthetics.  Ephedra (ma rodrigues): stop at least one day prior and may restart one day after. It can increase risk of heart attack or stroke and bleeding at the surgical site.  Annex's Wort: stop at least five days prior and may restart one day after. It can diminish the effects of medications given during surgery.  Ginseng: stop at least one week prior and may restart one day after. It lowers blood sugar and can increase bleeding at the surgical site.  Ginkgo: stop 36 hours prior and may restart one day after. It can increase bleeding at the surgical site.  Garlic: stop at least one week prior and may restart one day after.  Valerian: slowly decrease the dose over a few weeks prior to avoid withdrawal symptoms. It can increase sedative effects of anesthetics.  Echinacea: no stop/start date. It can be associated with allergic reactions and suppression of your immune system.  Vitamin E/Omgea-3/Fish Oil/Flax/Glucosamine/Chondroitin: stop two weeks prior and may restart one day after. They can increase bleeding at the surgical site.      TIPS FOR SUCCESSFUL POST-OP HEALING  How you care for your surgical site is critically important to achieve successful results. Avoidance  of injury, infection, excess swelling, scar tissue, and stiffness are completely dependent on the care you provide over the next six weeks after surgery.    Your foot requires significant rest and elevation. Sitting for long hours with your foot elevated, however, will create its own problems. Expect muscle aches, back pain, cramps etc. Optimal posture, lumbar support, back exercises, ice, and heat may help with your new aches and pains. DO not apply a heating pad to your foot or leg, as this can worsen pain or swelling. Instead apply ice packs behind the involved knee. Do not apply it directly to the skin.    Narcotic pain medications and inactivity may also cause constipation. Limiting the use of these narcotics will help minimize this problem. The pain medication will not completely alleviate your pain. The purpose of pain pills is to take the edge off and help you get through the first few days. You can substitute extra strength Tylenol if pain is milder. Do not take Tylenol and prescription pain pills at the same time. Do not take more than 4,000mg of Tylenol in 24 hours. You can also minimize constipation by drinking plenty of water, eating lots of fruits and veggies, and taking the appropriate amount of Metamucil or other fiber supplements. These measures should be continued while on narcotic pain medications and if you remain inactive.    Showering can be a major challenge after surgery. Your incision requires about three days to become sealed from water. Your bandage should not get wet or removed. Attempt to avoid showing for three days after surgery and try a sponge bath instead. It can be dangerous showering while standing on only one foot so be careful. Consider borrowing a shower chair. If the bandage does get wet, call us immediately for a dressing change as this can slow the healing process or cause infection.    External pins need to be kept dry without ointment or moisture. Keep them covered at all  times. Protect them from clothing, blankets, and pets.  Any change or movement of the pins deserves a call to clinic. A loose pin will need to be removed. Never push them back into your foot.    Stiffness will develop after any operation due to scarring. The scar tissue begins to form immediately after the surgery. Inactivity can cause excess stiffness and may lead to blood clots, scar tissue, and adhesions.        SCAR CARE  Scarring is an unfortunate but unavoidable part of surgery. Every person scars differently and there is no way to predict how an individual's scar will look. Once the sutures are removed, there are a few things you can do to help minimize the scar tissue formation.    As soon as the skin is incised during surgery, the body is taking steps to prepare for healing. After about three days, the body has sent cells to the incision to begin the healing process. These cells, called fibroblasts, make collagen, a protein in the skin that helps provide strength. Once the skin has been sufficiently strengthened, the sutures are removed. Over the next year, the body synthesizes new collagen and breaks down old collagen to help achieve a strong scar that allows the foot and ankle to function appropriately. This is where patients can help the appearance of the scare, as it will change over the next year.    1. Do not expose the scar to the sun for one year.  2. Wear shoes/socks or cover your scar with zinc oxide  3. Any sun exposure can permanently darken the scar  4. Massage the scar 2-3 times a day to break down the collagen  5. Apply lotion/Vitamin E on the scar using some pressure  6. Try over the counter products like Mederma/Scar Zone    SHOWERING BEFORE SURGERY  You must wash with the soap twice before coming to the hospital for your surgery. This will decrease bacteria (germs) on your skin. It will also help reduce your chance of infection (illness caused by germs) after surgery.  Read the directions  and safety tips on the bottle of soap. Wash once the evening before surgery and once the morning of surgery. Use 4 ounces of soap each time. When showering, it is best to use 2 fresh washcloths and a fresh towel.   Items you will need for showerin newly washed washcloths    2 newly washed towels    8 ounces of soap  FOLLOW THESE INSTRUCTIONS:  The evening before surgery   1. Shower or bathe as you normally would, and use your regular soap and a clean washcloth. Give special attention to places where your incision (surgical cut) or catheters will be. This includes your groin area. Rinse well. You may wash your hair with your regular shampoo.  2. Next, wash your entire body from your chin down with the antiseptic soap. See the next page for directions about how to do this.  3. Rinse well and dry off using a newly washed towel.  The morning of surgery  Repeat steps 1, 2 and 3.   Other suggestions:    Wear freshly washed pajamas or clothing after your evening shower.    Wear freshly washed clothes the day of surgery.    Wash and change your bed sheets the day before surgery to have clean bed sheets after you shower and when you get home from surgery.    If you have trouble washing all areas, make sure someone helps you.    Don t use any deodorant, lotion or powder after your shower.    Women who are menstruating should wear a fresh sanitary pad to the hospital.    Hibiclens - 4% CHG  1. Put about 1 ounce of soap onto a clean, damp washcloth. Wash your skin from your chin down to your toes. Pay special attention to your incision areas.  2. Gently rub your incision area and surrounding areas.  3. Repeat steps 1 and 2 until you have used 4 ounces. Make sure you gently rub your incision area and surrounding areas for a full 5 minutes.   4. Rinse with water and towel dry with a clean towel.       * If you have any post-operative questions regarding your procedure, call our triage team at the [facility] at [phone] (option 2 > option 3).        BODY WEIGHT AND YOUR FEET  The following information is included in the after visit summary for all patients. Body weight can be a sensitive issue to discuss in clinic, but we think the following information is very important. Although we focus on the feet and ankles, we do support the overall health of our patients.     Many things can cause foot and ankle problems. Foot structure, activity level, foot mechanics and injuries are common causes of pain. One very important issue that often goes unmentioned, is body weight. Extra weight can cause increased stress on muscles, ligaments, bones and tendons. Sometimes just a few extra pounds is all it takes to put one over her/his threshold. Without reducing that stress, it can be difficult to alleviate pain. As Foot & Ankle specialists, our job is addressing the lower extremity problem and possible causes. Regarding extra body weight, we encourage patients to discuss diet and weight management plans with their primary care doctors. It is this team approach that gives you the best opportunity for pain relief and getting you back on your feet.      [facility] has a Comprehensive Weight Management Program. This program includes counseling, education, non-surgical and surgical approaches to weight loss. If you are interested in learning more either talk to you primary care provider or call [phone].

## 2019-04-04 NOTE — PROGRESS NOTES
"Foot & Ankle Surgery   April 4, 2019    S:  Pt is seen today for surgical consult for left ankle pain and fibular fracture nonunion.  Conservative therapies that have been attempted without sufficient relief include protected WB, RICE/tylenol.  Because of insufficient relief, the patient wishes to forego further non-operative cares in favor of surgical intervention.  No guarantees have been given to the outcome.      Vitals:    04/04/19 1109   Weight: 120.7 kg (266 lb)   Height: 1.638 m (5' 4.5\")     Body mass index is 44.95 kg/m .    ROS - Pos for CC.  Patient denies current nausea, vomiting, chills, fevers, belly pain, calf pain, chest pain or SOB.  Complete remainder of ROS is otherwise neg.      PE:  Lymph:    Neg for pitting/non-pitting edema BLE  Vasc:    Pulses palpable, CFT minimally delayed  Neuro:    Light touch sensation intact to all sensory nerve distributions without paresthesias  Derm:    Neg for nodules, lesions or ulcerations  MSK:    Left ankle - very tender medial and anterior gutters.  No POP ATFL or pain/instabiilty with anterior drawer.  She does have some discomfort at CFL but no pain/instability with stressing.  Points to course of peroneal tendons posterior to fibula as another area of pain.  Tender at distal fibula..  Calf:    Neg for redness, swelling or tenderness        Imaging:  xrays L ankle 2/22/19 - IMPRESSION: Distracted fracture is noted through the distal fibula,  transverse, minimally displaced. This is just below the level of the  ankle mortise with degree of distraction measuring up to 6 mm. No  interval healing. Margins appear fairly well corticated indicating  chronicity, given the clinical history, may relate to an injury in  July 2018. Ankle mortise appears intact. Mild lateral soft tissue  Swelling.    -MRI left ankle - IMPRESSION:  1. Age-indeterminate sprain of the anterior talofibular and anterior  tibiofibular ligaments.  2. Additional findings discussed " above.    Labs:    Vitamin D Deficiency screening 6 Abnormally low              Assessment:  39 year old female left lower extremity fibular fracture nonunion, ankle pain, lateral ligament pain, peroneal tendon pain    Plan:  The surgical procedure(s) was discussed in detail today.  Risks that were discussed include, but are not limited to, infection, wound healing complications, temporary or permanent nerve damage/numbness, painful scarring, possible recurrence of/new deformity, over-correction, under-correction, possible abnormal bone healing, fixation/hardware failure, continued or new pain, the need to revise the procedure, as well as blood clots, limb loss, pain syndrome and death.  After a discussion of the procedure, risks, and post-operative care and course, the patient has elected to forego further non-operative management in favor of surgical intervention.  No guarantees were given.  The patient was informed that swelling and generalized discomfort can persist for months, and full recovery can often take up to a year.  I anticipate discontinuation of prescription pain medication at/shortly after suture removal.    Diagnosis:  above  Procedure:  Left ankle arthroscopy, fibular fracture nonunion repair, CFL assessment/repair and peroneal tendon evaluation and repair  Activity Level:  NWB 6-10 weeks  Pain Management:  Percocet only.  Does not want to take vistaril.  Advised against ibuprofen, but she takes prn Motrin for migraines.    DVT prophylaxis:    Patient instructed on ankle ROM/calf massage exercises; patient advised on early frequent ambulation  Allergies:   No Known Allergies  Dispo:  Same day  WB assistive device:  Has crutches and access to rollabout  Smoking:  Recommend she speak with PCP about smoking cessation manuel-operatively as she does not do well with PO smoking cessation meds  Vit D status:  Will start on 50,000U weekly  Clot/bleeding disorder history:   neg  Job duties/anticipated time  off:  2 weeks min    Billing today is based on a time of >25 minutes, more than 50% of which was spent on counseling and coordinating care.    Body mass index is 44.95 kg/m .  Weight management plan: Patient was referred to their PCP to discuss a diet and exercise plan.      Linus Chiu DPM FACFAS FACFAOM  Podiatric Foot & Ankle Surgeon  [facility]  [phone]

## 2019-04-04 NOTE — PROGRESS NOTES
[facility]  [address] [facility] [address]  [phone]  Dept: [phone]    PRE-OP EVALUATION:  Today's date: 2019    Anders Zepeda (: 1980) presents for pre-operative evaluation assessment as requested by Dr. Chiu. The patient requires evaluation and anesthesia risk assessment prior to undergoing surgery/procedure for treatment of Left ankle arthroscopy and open reduction internal fixation of non-union distal fibula fracture with possible ligament/tendon reconstruction  (general with poplitea and saphenous nerve block.    Fax number for surgical facility: [facility]  Primary Physician: Manda Benjamin [facility]  Type of Anesthesia Anticipated: to be determined    Patient has a Health Care Directive or Living Will:  NO    Preop Questions 2019   Who is doing your surgery? Dr. Chiu   What are you having done? Left ankle arthroscopy Left Other   open reduction internal fixation of non-union distal fibula fracture          Date of Surgery/Procedure: 2019   Facility or Hospital where procedure/surgery will be performed: [facility]   1.  Do you have a history of Heart attack, stroke, stent, coronary bypass surgery, or other heart surgery? No   2.  Do you ever have any pain or discomfort in your chest? No   3.  Do you have a history of  Heart Failure? No   4.   Are you troubled by shortness of breath when:  walking on a level surface, or up a slight hill, or at night? No   5.  Do you currently have a cold, bronchitis or other respiratory infection? No   6.  Do you have a cough, shortness of breath, or wheezing? No   7.  Do you sometimes get pains in the calves of your legs when you walk? No   8. Do you or anyone in your family have previous history of blood clots? No   9.  Do you or does anyone in your family have a serious bleeding problem such as prolonged bleeding following surgeries or cuts? No   10. Have you ever had problems with anemia or been  told to take iron pills? YES    11. Have you had any abnormal blood loss such as black, tarry or bloody stools, or abnormal vaginal bleeding? No   12. Have you ever had a blood transfusion? No   13. Have you or any of your relatives ever had problems with anesthesia? No   14. Do you have sleep apnea, excessive snoring or daytime drowsiness? No   15. Do you have any prosthetic heart valves? No   16. Do you have prosthetic joints? No   17. Is there any chance that you may be pregnant? No         HPI:     HPI related to upcoming procedure: patient Veronica Zepeda is a very pleasant 39 year old female with chronic distal left fibular fracture who presents to internal medicine clinic for a pre op cardiac evaluation for upcoming podiatry surgery/procedure for treatment of Left ankle arthroscopy and open reduction internal fixation of non-union distal fibula fracture with possible ligament/tendon reconstruction  (general with poplitea and saphenous nerve block. Patient denies any known allergies to anesthesia agents. Patient denies any known CAD, CVA or Type 2 Diabetes. Patient denies any chest pain, headaches, fever or chills.       MEDICAL HISTORY:     Patient Active Problem List    Diagnosis Date Noted     Morbid obesity (H) 02/22/2019     Priority: Medium      Past Medical History:   Diagnosis Date     Ankle sprain      No past surgical history on file.  Current Outpatient Medications   Medication Sig Dispense Refill     order for [facility] [employer] M 1 Device 0     OTC products: NSAIDS    No Known Allergies   Latex Allergy: NO    Social History     Tobacco Use     Smoking status: Current Every Day Smoker     Packs/day: 1.00     Years: 25.00     Pack years: 25.00     Smokeless tobacco: Never Used   Substance Use Topics     Alcohol use: Not on file     History   Drug Use Not on file       REVIEW OF SYSTEMS:   Constitutional, neuro, ENT, endocrine, pulmonary, cardiac, gastrointestinal, genitourinary, musculoskeletal,  "integument and psychiatric systems are negative, except as otherwise noted.    EXAM:   /87 (BP Location: Right arm, Patient Position: Sitting, Cuff Size: Adult Large)   Pulse 74   Temp 97.4  F (36.3  C) (Oral)   Ht 1.638 m (5' 4.5\")   Wt 116.9 kg (257 lb 11.2 oz)   SpO2 98%   BMI 43.55 kg/m      GENERAL APPEARANCE: healthy, alert and no distress     EYES: EOMI, PERRL     HENT: ear canals and TM's normal and nose and mouth without ulcers or lesions     NECK: no adenopathy, no asymmetry, masses, or scars and thyroid normal to palpation     RESP: lungs clear to auscultation - no rales, rhonchi or wheezes     CV: regular rates and rhythm, normal S1 S2, no S3 or S4 and no murmur, click or rub     ABDOMEN:  soft, nontender, no HSM or masses and bowel sounds normal     MS: extremities normal- no gross deformities noted, no evidence of inflammation in joints, FROM in all extremities.     SKIN: no suspicious lesions or rashes     NEURO: Normal strength and tone, sensory exam grossly normal, mentation intact and speech normal     PSYCH: mentation appears normal. and affect normal/bright     LYMPHATICS: No cervical adenopathy    DIAGNOSTICS:   EKG: Not indicated due to non-vascular surgery and low risk of event (age <65 and without cardiac risk factors)    Recent Labs   Lab Test 01/18/11  0246   HGB 10.6*         POTASSIUM 3.9   CR 1.08*      Results for orders placed or performed in visit on 04/04/19   Hemoglobin   Result Value Ref Range    Hemoglobin 10.3 (L) 11.7 - 15.7 g/dL   Platelet count   Result Value Ref Range    Platelet Count 319 150 - 450 10e9/L       IMPRESSION:   Reason for surgery/procedure: left non-union distal fibula fracture  Diagnosis/reason for consult: pre op cardiac evaluation for upcoming podiatry surgery/procedure for treatment of Left ankle arthroscopy and open reduction internal fixation of non-union distal fibula fracture with possible ligament/tendon reconstruction "  (general with poplitea and saphenous nerve block.    The proposed surgical procedure is considered INTERMEDIATE risk.    REVISED CARDIAC RISK INDEX  The patient has the following serious cardiovascular risks for perioperative complications such as (MI, PE, VFib and 3  AV Block):  No serious cardiac risks  INTERPRETATION: 0 risks: Class I (very low risk - 0.4% complication rate)    The patient has the following additional risks for perioperative complications:  No identified additional risks      ICD-10-CM    1. Preop general physical exam Z01.818 Hemoglobin     Platelet count   2. Closed fracture of distal end of left fibula, unspecified fracture morphology, sequela S82.832S Hemoglobin     Platelet count   3. Tobacco dependence syndrome F17.200 nicotine (NICODERM CQ) 21 MG/24HR 24 hr patch     nicotine (COMMIT) 2 MG lozenge     Chronic tobacco dependence, the patient is requesting nicotine replacements to help with tobacco cessation.  nicotine (NICODERM CQ) 21 MG/24HR 24 hr patch,   nicotine (COMMIT) 2 MG lozenge      RECOMMENDATIONS:     --Patient is to take all scheduled medications on the day of surgery EXCEPT for modifications listed below.    Anticoagulant or Antiplatelet Medication Use  NSAIDS: Ibuprofen (Motrin):         Stop one day prior to surgery        APPROVAL GIVEN to proceed with proposed procedure, without further diagnostic evaluation       Signed Electronically by: Elsa Omalley MD    Copy of this evaluation report is provided to requesting physician.    [facility] Preop Guidelines    Revised Cardiac Risk Index

## 2019-04-17 ENCOUNTER — APPOINTMENT (OUTPATIENT)
Dept: GENERAL RADIOLOGY | Facility: CLINIC | Age: 39
End: 2019-04-17
Attending: PODIATRIST
Payer: COMMERCIAL

## 2019-04-17 ENCOUNTER — ANESTHESIA EVENT (OUTPATIENT)
Dept: SURGERY | Facility: CLINIC | Age: 39
End: 2019-04-17
Payer: COMMERCIAL

## 2019-04-17 ENCOUNTER — ANESTHESIA (OUTPATIENT)
Dept: SURGERY | Facility: CLINIC | Age: 39
End: 2019-04-17
Payer: COMMERCIAL

## 2019-04-17 ENCOUNTER — HOSPITAL ENCOUNTER (OUTPATIENT)
Facility: CLINIC | Age: 39
Discharge: HOME | End: 2019-04-17
Attending: PODIATRIST | Admitting: PODIATRIST
Payer: COMMERCIAL

## 2019-04-17 VITALS
DIASTOLIC BLOOD PRESSURE: 71 MMHG | OXYGEN SATURATION: 98 % | TEMPERATURE: 97.7 F | WEIGHT: 255 LBS | RESPIRATION RATE: 18 BRPM | SYSTOLIC BLOOD PRESSURE: 111 MMHG | HEIGHT: 64 IN | HEART RATE: 57 BPM | BODY MASS INDEX: 43.54 KG/M2

## 2019-04-17 DIAGNOSIS — Z98.890 STATUS POST ORIF OF FRACTURE OF ANKLE: Primary | ICD-10-CM

## 2019-04-17 DIAGNOSIS — Z87.81 STATUS POST ORIF OF FRACTURE OF ANKLE: Primary | ICD-10-CM

## 2019-04-17 LAB — HCG UR QL: NEGATIVE

## 2019-04-17 PROCEDURE — 25000132 ZZH RX MED GY IP 250 OP 250 PS 637: Performed by: PODIATRIST

## 2019-04-17 PROCEDURE — 40000171 ZZH STATISTIC PRE-PROCEDURE ASSESSMENT III: Performed by: PODIATRIST

## 2019-04-17 PROCEDURE — 27658 REPAIR OF LEG TENDON EACH: CPT | Mod: 59 | Performed by: PODIATRIST

## 2019-04-17 PROCEDURE — 27210794 ZZH OR GENERAL SUPPLY STERILE: Performed by: PODIATRIST

## 2019-04-17 PROCEDURE — 27814 TREATMENT OF ANKLE FRACTURE: CPT | Mod: LT | Performed by: PODIATRIST

## 2019-04-17 PROCEDURE — 29898 ANKLE ARTHROSCOPY/SURGERY: CPT | Mod: 59 | Performed by: PODIATRIST

## 2019-04-17 PROCEDURE — C1713 ANCHOR/SCREW BN/BN,TIS/BN: HCPCS | Performed by: PODIATRIST

## 2019-04-17 PROCEDURE — 27211024 ZZHC OR SUPPLY OTHER OPNP: Performed by: PODIATRIST

## 2019-04-17 PROCEDURE — 81025 URINE PREGNANCY TEST: CPT | Performed by: ANESTHESIOLOGY

## 2019-04-17 PROCEDURE — 71000027 ZZH RECOVERY PHASE 2 EACH 15 MINS: Performed by: PODIATRIST

## 2019-04-17 PROCEDURE — 25000128 H RX IP 250 OP 636: Performed by: NURSE ANESTHETIST, CERTIFIED REGISTERED

## 2019-04-17 PROCEDURE — 25800030 ZZH RX IP 258 OP 636: Performed by: NURSE ANESTHETIST, CERTIFIED REGISTERED

## 2019-04-17 PROCEDURE — 37000008 ZZH ANESTHESIA TECHNICAL FEE, 1ST 30 MIN: Performed by: PODIATRIST

## 2019-04-17 PROCEDURE — 25000125 ZZHC RX 250: Performed by: NURSE ANESTHETIST, CERTIFIED REGISTERED

## 2019-04-17 PROCEDURE — 25000128 H RX IP 250 OP 636: Performed by: ANESTHESIOLOGY

## 2019-04-17 PROCEDURE — 93010 ELECTROCARDIOGRAM REPORT: CPT | Performed by: INTERNAL MEDICINE

## 2019-04-17 PROCEDURE — C1762 CONN TISS, HUMAN(INC FASCIA): HCPCS | Performed by: PODIATRIST

## 2019-04-17 PROCEDURE — 36000063 ZZH SURGERY LEVEL 4 EA 15 ADDTL MIN: Performed by: PODIATRIST

## 2019-04-17 PROCEDURE — 25800025 ZZH RX 258: Performed by: PODIATRIST

## 2019-04-17 PROCEDURE — 25800030 ZZH RX IP 258 OP 636: Performed by: ANESTHESIOLOGY

## 2019-04-17 PROCEDURE — 37000009 ZZH ANESTHESIA TECHNICAL FEE, EACH ADDTL 15 MIN: Performed by: PODIATRIST

## 2019-04-17 PROCEDURE — 40000986 XR ANKLE PORT LT 2 VW: Mod: LT

## 2019-04-17 PROCEDURE — 25000128 H RX IP 250 OP 636: Performed by: PODIATRIST

## 2019-04-17 PROCEDURE — 36000093 ZZH SURGERY LEVEL 4 1ST 30 MIN: Performed by: PODIATRIST

## 2019-04-17 PROCEDURE — 71000012 ZZH RECOVERY PHASE 1 LEVEL 1 FIRST HR: Performed by: PODIATRIST

## 2019-04-17 DEVICE — IMPLANTABLE DEVICE: Type: IMPLANTABLE DEVICE | Site: ANKLE | Status: FUNCTIONAL

## 2019-04-17 DEVICE — GRAFT BONE CRUSH CANC 15ML 400075: Type: IMPLANTABLE DEVICE | Site: ANKLE | Status: FUNCTIONAL

## 2019-04-17 RX ORDER — OXYCODONE HYDROCHLORIDE 5 MG/1
TABLET ORAL
Qty: 20 TABLET | Refills: 0 | Status: SHIPPED | OUTPATIENT
Start: 2019-04-17 | End: 2019-06-20

## 2019-04-17 RX ORDER — HYDRALAZINE HYDROCHLORIDE 20 MG/ML
2.5-5 INJECTION INTRAMUSCULAR; INTRAVENOUS EVERY 10 MIN PRN
Status: DISCONTINUED | OUTPATIENT
Start: 2019-04-17 | End: 2019-04-17 | Stop reason: HOSPADM

## 2019-04-17 RX ORDER — NALOXONE HYDROCHLORIDE 0.4 MG/ML
.1-.4 INJECTION, SOLUTION INTRAMUSCULAR; INTRAVENOUS; SUBCUTANEOUS
Status: DISCONTINUED | OUTPATIENT
Start: 2019-04-17 | End: 2019-04-17 | Stop reason: HOSPADM

## 2019-04-17 RX ORDER — ONDANSETRON 2 MG/ML
INJECTION INTRAMUSCULAR; INTRAVENOUS PRN
Status: DISCONTINUED | OUTPATIENT
Start: 2019-04-17 | End: 2019-04-17

## 2019-04-17 RX ORDER — METOCLOPRAMIDE 10 MG/1
10 TABLET ORAL EVERY 6 HOURS PRN
Status: DISCONTINUED | OUTPATIENT
Start: 2019-04-17 | End: 2019-04-17 | Stop reason: HOSPADM

## 2019-04-17 RX ORDER — HYDROXYZINE PAMOATE 25 MG/1
CAPSULE ORAL
Qty: 20 CAPSULE | Refills: 0 | Status: SHIPPED | OUTPATIENT
Start: 2019-04-17 | End: 2019-06-20

## 2019-04-17 RX ORDER — METOCLOPRAMIDE HYDROCHLORIDE 5 MG/ML
10 INJECTION INTRAMUSCULAR; INTRAVENOUS EVERY 6 HOURS PRN
Status: DISCONTINUED | OUTPATIENT
Start: 2019-04-17 | End: 2019-04-17 | Stop reason: HOSPADM

## 2019-04-17 RX ORDER — HYDROMORPHONE HYDROCHLORIDE 1 MG/ML
.3-.5 INJECTION, SOLUTION INTRAMUSCULAR; INTRAVENOUS; SUBCUTANEOUS EVERY 10 MIN PRN
Status: DISCONTINUED | OUTPATIENT
Start: 2019-04-17 | End: 2019-04-17 | Stop reason: HOSPADM

## 2019-04-17 RX ORDER — KETOROLAC TROMETHAMINE 30 MG/ML
30 INJECTION, SOLUTION INTRAMUSCULAR; INTRAVENOUS EVERY 6 HOURS PRN
Status: DISCONTINUED | OUTPATIENT
Start: 2019-04-17 | End: 2019-04-17 | Stop reason: HOSPADM

## 2019-04-17 RX ORDER — GLYCOPYRROLATE 0.2 MG/ML
INJECTION, SOLUTION INTRAMUSCULAR; INTRAVENOUS PRN
Status: DISCONTINUED | OUTPATIENT
Start: 2019-04-17 | End: 2019-04-17

## 2019-04-17 RX ORDER — LIDOCAINE HYDROCHLORIDE 10 MG/ML
INJECTION, SOLUTION INFILTRATION; PERINEURAL PRN
Status: DISCONTINUED | OUTPATIENT
Start: 2019-04-17 | End: 2019-04-17

## 2019-04-17 RX ORDER — SODIUM CHLORIDE, SODIUM LACTATE, POTASSIUM CHLORIDE, CALCIUM CHLORIDE 600; 310; 30; 20 MG/100ML; MG/100ML; MG/100ML; MG/100ML
INJECTION, SOLUTION INTRAVENOUS CONTINUOUS
Status: DISCONTINUED | OUTPATIENT
Start: 2019-04-17 | End: 2019-04-17 | Stop reason: HOSPADM

## 2019-04-17 RX ORDER — CEFAZOLIN SODIUM 1 G/3ML
1 INJECTION, POWDER, FOR SOLUTION INTRAMUSCULAR; INTRAVENOUS SEE ADMIN INSTRUCTIONS
Status: DISCONTINUED | OUTPATIENT
Start: 2019-04-17 | End: 2019-04-17 | Stop reason: HOSPADM

## 2019-04-17 RX ORDER — FENTANYL CITRATE 50 UG/ML
INJECTION, SOLUTION INTRAMUSCULAR; INTRAVENOUS PRN
Status: DISCONTINUED | OUTPATIENT
Start: 2019-04-17 | End: 2019-04-17

## 2019-04-17 RX ORDER — LABETALOL 20 MG/4 ML (5 MG/ML) INTRAVENOUS SYRINGE
10
Status: DISCONTINUED | OUTPATIENT
Start: 2019-04-17 | End: 2019-04-17 | Stop reason: HOSPADM

## 2019-04-17 RX ORDER — NEOSTIGMINE METHYLSULFATE 1 MG/ML
VIAL (ML) INJECTION PRN
Status: DISCONTINUED | OUTPATIENT
Start: 2019-04-17 | End: 2019-04-17

## 2019-04-17 RX ORDER — FENTANYL CITRATE 50 UG/ML
25-50 INJECTION, SOLUTION INTRAMUSCULAR; INTRAVENOUS
Status: DISCONTINUED | OUTPATIENT
Start: 2019-04-17 | End: 2019-04-17 | Stop reason: HOSPADM

## 2019-04-17 RX ORDER — SODIUM CHLORIDE, SODIUM LACTATE, POTASSIUM CHLORIDE, AND CALCIUM CHLORIDE .6; .31; .03; .02 G/100ML; G/100ML; G/100ML; G/100ML
IRRIGANT IRRIGATION PRN
Status: DISCONTINUED | OUTPATIENT
Start: 2019-04-17 | End: 2019-04-17 | Stop reason: HOSPADM

## 2019-04-17 RX ORDER — PROPOFOL 10 MG/ML
INJECTION, EMULSION INTRAVENOUS PRN
Status: DISCONTINUED | OUTPATIENT
Start: 2019-04-17 | End: 2019-04-17

## 2019-04-17 RX ORDER — LIDOCAINE 40 MG/G
CREAM TOPICAL
Status: DISCONTINUED | OUTPATIENT
Start: 2019-04-17 | End: 2019-04-17 | Stop reason: HOSPADM

## 2019-04-17 RX ORDER — MEPERIDINE HYDROCHLORIDE 50 MG/ML
12.5 INJECTION INTRAMUSCULAR; INTRAVENOUS; SUBCUTANEOUS
Status: DISCONTINUED | OUTPATIENT
Start: 2019-04-17 | End: 2019-04-17 | Stop reason: HOSPADM

## 2019-04-17 RX ORDER — DIMENHYDRINATE 50 MG/ML
25 INJECTION, SOLUTION INTRAMUSCULAR; INTRAVENOUS
Status: DISCONTINUED | OUTPATIENT
Start: 2019-04-17 | End: 2019-04-17 | Stop reason: HOSPADM

## 2019-04-17 RX ORDER — CEFAZOLIN SODIUM 2 G/100ML
2 INJECTION, SOLUTION INTRAVENOUS
Status: COMPLETED | OUTPATIENT
Start: 2019-04-17 | End: 2019-04-17

## 2019-04-17 RX ORDER — DEXAMETHASONE SODIUM PHOSPHATE 4 MG/ML
4 INJECTION, SOLUTION INTRA-ARTICULAR; INTRALESIONAL; INTRAMUSCULAR; INTRAVENOUS; SOFT TISSUE EVERY 10 MIN PRN
Status: DISCONTINUED | OUTPATIENT
Start: 2019-04-17 | End: 2019-04-17 | Stop reason: HOSPADM

## 2019-04-17 RX ORDER — ONDANSETRON 2 MG/ML
4 INJECTION INTRAMUSCULAR; INTRAVENOUS EVERY 30 MIN PRN
Status: DISCONTINUED | OUTPATIENT
Start: 2019-04-17 | End: 2019-04-17 | Stop reason: HOSPADM

## 2019-04-17 RX ORDER — PROPOFOL 10 MG/ML
INJECTION, EMULSION INTRAVENOUS CONTINUOUS PRN
Status: DISCONTINUED | OUTPATIENT
Start: 2019-04-17 | End: 2019-04-17

## 2019-04-17 RX ORDER — ONDANSETRON 4 MG/1
4 TABLET, ORALLY DISINTEGRATING ORAL EVERY 30 MIN PRN
Status: DISCONTINUED | OUTPATIENT
Start: 2019-04-17 | End: 2019-04-17 | Stop reason: HOSPADM

## 2019-04-17 RX ORDER — OXYCODONE HYDROCHLORIDE 5 MG/1
5 TABLET ORAL
Status: COMPLETED | OUTPATIENT
Start: 2019-04-17 | End: 2019-04-17

## 2019-04-17 RX ORDER — DEXAMETHASONE SODIUM PHOSPHATE 4 MG/ML
INJECTION, SOLUTION INTRA-ARTICULAR; INTRALESIONAL; INTRAMUSCULAR; INTRAVENOUS; SOFT TISSUE PRN
Status: DISCONTINUED | OUTPATIENT
Start: 2019-04-17 | End: 2019-04-17

## 2019-04-17 RX ADMIN — PROPOFOL 200 MG: 10 INJECTION, EMULSION INTRAVENOUS at 11:21

## 2019-04-17 RX ADMIN — FENTANYL CITRATE 50 MCG: 50 INJECTION, SOLUTION INTRAMUSCULAR; INTRAVENOUS at 11:41

## 2019-04-17 RX ADMIN — CEFAZOLIN SODIUM 2 G: 2 INJECTION, SOLUTION INTRAVENOUS at 11:23

## 2019-04-17 RX ADMIN — Medication 2 MG: at 13:12

## 2019-04-17 RX ADMIN — FENTANYL CITRATE 100 MCG: 50 INJECTION, SOLUTION INTRAMUSCULAR; INTRAVENOUS at 11:20

## 2019-04-17 RX ADMIN — PROPOFOL 35 MCG/KG/MIN: 10 INJECTION, EMULSION INTRAVENOUS at 11:28

## 2019-04-17 RX ADMIN — PHENYLEPHRINE HYDROCHLORIDE 100 MCG: 10 INJECTION, SOLUTION INTRAMUSCULAR; INTRAVENOUS; SUBCUTANEOUS at 12:17

## 2019-04-17 RX ADMIN — FENTANYL CITRATE 50 MCG: 50 INJECTION, SOLUTION INTRAMUSCULAR; INTRAVENOUS at 13:57

## 2019-04-17 RX ADMIN — ONDANSETRON 4 MG: 2 INJECTION INTRAMUSCULAR; INTRAVENOUS at 12:42

## 2019-04-17 RX ADMIN — GLYCOPYRROLATE 0.2 MG: 0.2 INJECTION, SOLUTION INTRAMUSCULAR; INTRAVENOUS at 11:20

## 2019-04-17 RX ADMIN — SODIUM CHLORIDE, POTASSIUM CHLORIDE, SODIUM LACTATE AND CALCIUM CHLORIDE: 600; 310; 30; 20 INJECTION, SOLUTION INTRAVENOUS at 10:20

## 2019-04-17 RX ADMIN — OXYCODONE HYDROCHLORIDE 5 MG: 5 TABLET ORAL at 14:55

## 2019-04-17 RX ADMIN — DEXAMETHASONE SODIUM PHOSPHATE 4 MG: 4 INJECTION, SOLUTION INTRA-ARTICULAR; INTRALESIONAL; INTRAMUSCULAR; INTRAVENOUS; SOFT TISSUE at 11:21

## 2019-04-17 RX ADMIN — MIDAZOLAM 2 MG: 1 INJECTION INTRAMUSCULAR; INTRAVENOUS at 11:18

## 2019-04-17 RX ADMIN — GLYCOPYRROLATE 0.4 MG: 0.2 INJECTION, SOLUTION INTRAMUSCULAR; INTRAVENOUS at 13:12

## 2019-04-17 RX ADMIN — FENTANYL CITRATE 50 MCG: 50 INJECTION, SOLUTION INTRAMUSCULAR; INTRAVENOUS at 12:32

## 2019-04-17 RX ADMIN — LIDOCAINE HYDROCHLORIDE 50 MG: 10 INJECTION, SOLUTION INFILTRATION; PERINEURAL at 11:20

## 2019-04-17 RX ADMIN — FENTANYL CITRATE 50 MCG: 50 INJECTION, SOLUTION INTRAMUSCULAR; INTRAVENOUS at 14:05

## 2019-04-17 RX ADMIN — Medication 0.5 MG: at 13:45

## 2019-04-17 RX ADMIN — SODIUM CHLORIDE, POTASSIUM CHLORIDE, SODIUM LACTATE AND CALCIUM CHLORIDE: 600; 310; 30; 20 INJECTION, SOLUTION INTRAVENOUS at 12:28

## 2019-04-17 RX ADMIN — HYDROMORPHONE HYDROCHLORIDE 1 MG: 1 INJECTION, SOLUTION INTRAMUSCULAR; INTRAVENOUS; SUBCUTANEOUS at 12:59

## 2019-04-17 RX ADMIN — ROCURONIUM BROMIDE 50 MG: 10 INJECTION INTRAVENOUS at 11:22

## 2019-04-17 RX ADMIN — FENTANYL CITRATE 50 MCG: 50 INJECTION, SOLUTION INTRAMUSCULAR; INTRAVENOUS at 13:19

## 2019-04-17 ASSESSMENT — MIFFLIN-ST. JEOR: SCORE: 1819.42

## 2019-04-17 NOTE — ANESTHESIA POSTPROCEDURE EVALUATION
Patient: Anders Zepeda    Procedure(s):  Left ankle arthroscopy, open reduction internal fixation of non-union distal fibula fracture with possible ligament/tendon reconstruction    Diagnosis:closed displaced fracture  Diagnosis Additional Information: Diagnosis: closed displaced fracture    Anesthesia Type:  General    Note:  Anesthesia Post Evaluation    Patient location during evaluation: PACU  Patient participation: Able to fully participate in evaluation  Level of consciousness: awake and alert  Pain management: adequate  Airway patency: patent  Cardiovascular status: acceptable  Respiratory status: acceptable  Hydration status: acceptable  PONV: controlled     Anesthetic complications: None          Last vitals:  Vitals:    04/17/19 1340 04/17/19 1345 04/17/19 1400   BP: (!) 136/102 122/62 122/68   Pulse: 83 76    Resp: 23 15 10   Temp:      SpO2: 97% 97% 100%         Electronically Signed By: Derrek Avila MD  April 17, 2019  2:20 PM

## 2019-04-17 NOTE — PROGRESS NOTES
S:  Patient seen at bedside in PACU bed 6 for eval and fitting of L PFS splint.      O:  Patient appears alert and L LE has post-op dressing/wrapped in ace wrap.  Patient states she is doing ok at the moment.    A:  Fit with L size medium PFS splint.  Pateint shown donning and strap application.  To wear snug till further indicated by physician.    P:  To contact the office with any further questions or concerns.    G:  Post-op management and maintain neutral ankle ROM.  Omari Damon ,LPO

## 2019-04-17 NOTE — OR NURSING
Discharge instructions given and patient and spouse verbalized understanding.  Prescription medication given.  Vital sign stable, no complaints of pain, numb from block.  Dressing clean, dry and intact.  Discharged via wheelchair to car, nonweightbearing.

## 2019-04-17 NOTE — BRIEF OP NOTE
[facility]    Brief Operative Note    Pre-operative diagnosis: closed displaced fracture  Post-operative diagnosis sp left ankle arthroscopy with extensive synovectomy; peroneus brevis debridement/repair, resection of nonunion and ORIF distal fibular fracture; stressing of ankle under anesthesia  Procedure: Procedure(s):  Left ankle arthroscopy, open reduction internal fixation of non-union distal fibula fracture with possible ligament/tendon reconstruction  Surgeon: Surgeon(s) and Role:     * Linus Chiu DPM - Primary  Anesthesia: Other   Estimated blood loss: Less than 50 ml  Drains: None  Specimens: * No specimens in log *  Findings:   significant degenerative changes within the ankle joint; fibular fracture nonunion resected to bleeding margins; superficial degenerative changes and thinning of the peroneus brevis tendon with surrounding fibrosis/adhesions.  Complications: None.  Implants:    Implant Name Type Inv. Item Serial No.  Lot No. LRB No. Used   GRAFT BONE CRUSH CANC 15ML [id number] Bone/Tissue/Biologic GRAFT BONE CRUSH CANC 15ML [id number] MUSCULOSKELETAL RING  Left 1   Locking Lateral Hook Plate 3 Hole    [employer] 8008 [id number] Left 1   Ar[employer]rex Low Profile Screw     [employer] 8008 [id number] Left 1   Ar[employer]rex Low Profile Screw, Stainless Steel    [employer] 8008 [id number] Left 1   Ar[employer]rex Ankle 3.5 Nonlocking, Cortical    [employer] 8008 [id number] Left 1   arthrex ankle low profile screw 3.5 locking    [employer] 8008 [id number] Left 1   Arthrex Ankle Low Profile Screws, 3.5 Locking    [employer] 8008 [id number] Left 1   Arthrex Ankle low profile screw, 4mm nonlocking, cancellous    [employer] 8008 [id number] Left 1

## 2019-04-17 NOTE — DISCHARGE INSTRUCTIONS
GENERAL ANESTHESIA OR SEDATION ADULT DISCHARGE INSTRUCTIONS   SPECIAL PRECAUTIONS FOR 24 HOURS AFTER SURGERY    IT IS NOT UNUSUAL TO FEEL LIGHT-HEADED OR FAINT, UP TO 24 HOURS AFTER SURGERY OR WHILE TAKING PAIN MEDICATION.  IF YOU HAVE THESE SYMPTOMS; SIT FOR A FEW MINUTES BEFORE STANDING AND HAVE SOMEONE ASSIST YOU WHEN YOU GET UP TO WALK OR USE THE BATHROOM.    YOU SHOULD REST AND RELAX FOR THE NEXT 24 HOURS AND YOU MUST MAKE ARRANGEMENTS TO HAVE SOMEONE STAY WITH YOU FOR AT LEAST 24 HOURS AFTER YOUR DISCHARGE.  AVOID HAZARDOUS AND STRENUOUS ACTIVITIES.  DO NOT MAKE IMPORTANT DECISIONS FOR 24 HOURS.    DO NOT DRIVE ANY VEHICLE OR OPERATE MECHANICAL EQUIPMENT FOR 24 HOURS FOLLOWING THE END OF YOUR SURGERY.  EVEN THOUGH YOU MAY FEEL NORMAL, YOUR REACTIONS MAY BE AFFECTED BY THE MEDICATION YOU HAVE RECEIVED.    DO NOT DRINK ALCOHOLIC BEVERAGES FOR 24 HOURS FOLLOWING YOUR SURGERY.    DRINK CLEAR LIQUIDS (APPLE JUICE, GINGER ALE, 7-UP, BROTH, ETC.).  PROGRESS TO YOUR REGULAR DIET AS YOU FEEL ABLE.    YOU MAY HAVE A DRY MOUTH, A SORE THROAT, MUSCLES ACHES OR TROUBLE SLEEPING.  THESE SHOULD GO AWAY AFTER 24 HOURS.    CALL YOUR DOCTOR FOR ANY OF THE FOLLOWING:  SIGNS OF INFECTION (FEVER, GROWING TENDERNESS AT THE SURGERY SITE, A LARGE AMOUNT OF DRAINAGE OR BLEEDING, SEVERE PAIN, FOUL-SMELLING DRAINAGE, REDNESS OR SWELLING.    IT HAS BEEN OVER 8 TO 10 HOURS SINCE SURGERY AND YOU ARE STILL NOT ABLE TO URINATE (PASS WATER).

## 2019-04-17 NOTE — ANESTHESIA PROCEDURE NOTES
Peripheral nerve/Neuraxial procedure note : Sciatic  Pre-Procedure    Location: pre-op    Procedure Times:4/17/2019 10:42 AM and 4/17/2019 11:06 AM  Pre-Anesthestic Checklist: patient identified, IV checked, site marked, risks and benefits discussed, informed consent, monitors and equipment checked, pre-op evaluation, at physician/surgeon's request and post-op pain management    Timeout  Correct Patient: Yes   Correct Procedure: Yes   Correct Site: Yes   Correct Laterality: Yes   Correct Position: Yes   Site Marked: Yes   .   Procedure Documentation    Diagnosis:ANKLE PAIN.    Procedure:  left  Sciatic.     Ultrasound used to identify targeted nerve, plexus, or vascular marker and placed a needle adjacent to it., Ultrasound was used to visualize the spread of the anesthetic in close proximity to the above stated nerve.   Patient Prep;mask, sterile gloves, povidone-iodine 7.5% surgical scrub.  Nerve Stim: Initial Level 1 mA. Lowest motor response mA..  Needle: insulated, short bevel Needle Gauge: 22.    Needle Length (Inches) 2  Insertion Method: Single Shot.       Assessment/Narrative  Paresthesias: No.  .  The placement was negative for: blood aspirated, painful injection and site bleeding.  Bolus given via needle. No blood aspirated via catheter.   Secured via.   Complications: none. Test dose of mL lidocaine 2% w/ 1:200,000 epinephrine at. Test dose negative for signs of intravascular, subdural or intrathecal injection. Comments:  30ml of 0.5% Bupivicaine w/ 1:200,000 epi + 10ml of 2% Lidocaine injected    The surgeon has given a verbal order transferring care of this patient to me for the performance of a regional analgesia block for post-op pain control. It is requested of me because I am uniquely trained and qualified to perform this block and the surgeon is neither trained nor qualified to perform this procedure.

## 2019-04-17 NOTE — ANESTHESIA CARE TRANSFER NOTE
Patient: Anders Zepeda    Procedure(s):  Left ankle arthroscopy, open reduction internal fixation of non-union distal fibula fracture with possible ligament/tendon reconstruction    Diagnosis: closed displaced fracture  Diagnosis Additional Information: No value filed.    Anesthesia Type:   No value filed.     Note:  Airway :Face Mask  Patient transferred to:PACU  Comments: [email or web address].Handoff Report: Identifed the Patient, Identified the Reponsible Provider, Reviewed the pertinent medical history, Discussed the surgical course, Reviewed Intra-OP anesthesia mangement and issues during anesthesia, Set expectations for post-procedure period and Allowed opportunity for questions and acknowledgement of understanding      Vitals: (Last set prior to Anesthesia Care Transfer)    CRNA VITALS  4/17/2019 1258 - 4/17/2019 1334      4/17/2019             Pulse:  102    SpO2:  100 %                Electronically Signed By: LILLY Montes CRNA  April 17, 2019  1:34 PM

## 2019-04-17 NOTE — ANESTHESIA PREPROCEDURE EVALUATION
Anesthesia Pre-Procedure Evaluation    Patient: Anders Zepeda   MRN: [id number] : 1980          Preoperative Diagnosis: closed displaced fracture    Procedure(s):  Left ankle arthroscopy, open reduction internal fixation of non-union distal fibula fracture with possible ligament/tendon reconstruction    Past Medical History:   Diagnosis Date     Ankle sprain      Past Surgical History:   Procedure Laterality Date     ABDOMEN SURGERY      c section     HERNIA REPAIR       Anesthesia Evaluation     . Pt has had prior anesthetic. Type: General           ROS/MED HX    ENT/Pulmonary:  - neg pulmonary ROS     Neurologic:  - neg neurologic ROS     Cardiovascular:  - neg cardiovascular ROS       METS/Exercise Tolerance:     Hematologic:  - neg hematologic  ROS       Musculoskeletal:   (+) fracture lower extremity: Ankle, -       GI/Hepatic:  - neg GI/hepatic ROS       Renal/Genitourinary:  - ROS Renal section negative       Endo:     (+) Obesity, .      Psychiatric:  - neg psychiatric ROS       Infectious Disease:  - neg infectious disease ROS       Malignancy:      - no malignancy   Other:    (+) No chance of pregnancy C-spine cleared: N/A, no H/O Chronic Pain,no other significant disability   - neg other ROS                      Physical Exam  Normal systems: cardiovascular, pulmonary and dental    Airway   Mallampati: II  TM distance: >3 FB  Neck ROM: full    Dental     Cardiovascular       Pulmonary             Lab Results   Component Value Date    WBC 5.6 2011    HGB 10.3 (L) 2019    HCT 32.1 (L) 2011     2019     2011    POTASSIUM 3.9 2011    CHLORIDE 104 2011    CO2 27 2011    BUN 11 2011    CR 1.08 (H) 2011    GLC 87 2011    ALEX 8.8 2011    HCG Negative 2019    HCGS Negative 2011       Preop Vitals  BP Readings from Last 3 Encounters:   19 122/68   19 144/87   19 128/76    Pulse Readings from  "Last 3 Encounters:   04/17/19 76   04/04/19 74   03/01/19 81      Resp Readings from Last 3 Encounters:   04/17/19 10    SpO2 Readings from Last 3 Encounters:   04/17/19 100%   04/04/19 98%   02/22/19 98%      Temp Readings from Last 1 Encounters:   04/17/19 97.6  F (36.4  C) (Temporal)    Ht Readings from Last 1 Encounters:   04/17/19 1.638 m (5' 4.49\")      Wt Readings from Last 1 Encounters:   04/17/19 115.7 kg (255 lb)    Estimated body mass index is 43.11 kg/m  as calculated from the following:    Height as of this encounter: 1.638 m (5' 4.49\").    Weight as of this encounter: 115.7 kg (255 lb).       Anesthesia Plan      History & Physical Review  History and physical reviewed and following examination; no interval change.    ASA Status:  2 .    NPO Status:  > 8 hours    Plan for General and ETT with Intravenous induction. Maintenance will be Balanced.    PONV prophylaxis:  Ondansetron (or other 5HT-3) and Dexamethasone or Solumedrol       Postoperative Care      Consents  Anesthetic plan, risks, benefits and alternatives discussed with:  Patient.  Use of blood products discussed: Yes.   Use of blood products discussed with Patient.  Consented to blood products.  .                 Derrek Avila MD                    .  "

## 2019-04-18 LAB — INTERPRETATION ECG - MUSE: NORMAL

## 2019-04-18 NOTE — OP NOTE
Procedure Date: 04/17/2019      SURGEON:  Linus Chiu DPM      ASSISTANT:  Fabian Finnegan, PGY-3.      PREOPERATIVE DIAGNOSES:   1.  Left intra-articular ankle pain.   2.  Left distal fibular fracture nonunion.   3.  Left peroneal tendinitis.      POSTOPERATIVE DIAGNOSES:     1.  Left intra-articular ankle pain.   2.  Left distal fibular fracture nonunion.   3.  Left peroneal tendinitis.      PROCEDURES:   1.  Ankle arthroscopy with extensive synovectomy, left lower extremity.   2.  Peroneus brevis debridement, repair, left lower extremity.   3.  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   4.  Open reduction and internal fixation, left distal fibular fracture nonunion.   5.  Stressing of left ankle under anesthesia.      PATHOLOGY:  None.      ANESTHESIA:  General endotracheal with popliteal and saphenous nerve blocks.      HEMOSTASIS:  A well-padded pneumatic thigh cuff.      ESTIMATED BLOOD LOSS:  30 mL including 15 mL blood draw for PRP spin down.      MATERIALS:  See nursing note for details.     1.  We utilized an Arthrex left distal fibular hook plate with associated locking and nonlocking screws.    2.  A 2-0 FiberWire for peroneus brevis tendon repair.      INJECTABLES:  Preoperative block by Anesthesia.      COMPLICATIONS:  None apparent.      INDICATIONS FOR PROCEDURE:  The patient is a pleasant 39-year-old female who was referred to me by my partner, Satya Hannah, for treatment of left ankle pain and distal fibular fracture nonunion.  The original injury occurred in 07/2018 where she stepped off a curb, but did not recall specific injury or sprain.  X-rays on 02/22/2019 showed the distal fibular fracture nonunion.  On my initial examination, the patient had pain along the anterior gutter and the medial soft spot of the ankle, as well as at the distal fibula and along the course of the peroneal tendons posterior to the fibula.  She elected to proceed with surgical management.  The  patient is a 20+ year smoker, 1 pack per day, and had a preoperative vitamin D level of 6.  She was started on a smoking cessation plan and was placed on 50,000 units weekly vitamin D prescription.      DESCRIPTION OF PROCEDURE:  After obtaining written consent, the patient received a preoperative popliteal block.  She was transferred to the operating room, placed in supine position on the operating table.  She was placed under general anesthesia and was then rotated to lateral, 3/4 left side up.  The left lower extremity was then prepped and draped in normal aseptic fashion, exsanguinated, and the well-padded pneumatic thigh cuff was inflated.  The patient, procedure, and site were correctly identified by OR staff.      Attention was directed to the left ankle where pertinent an anterior and lateral anatomy was drawn out.  The joint was insufflated with 15 mL of lactated Ringer's.  A standard anteromedial portal was created and the anterolateral portion of the joint was transilluminated.  The cutaneous nerves were not identified, so standard anterolateral portal was created.  Intra-articular findings were positive for extensive degenerative changes including full-thickness cartilage loss along the anterior margin of the talus.  She had impinging bodies in the anterolateral and anteromedial gutters as well as a large fibrous band along the anterior margin of the ankle joint.  The fibrous bands and chronic synovitis were debrided with the Cuda shaver.  With passive range of motion with the arthroscope in place, there were no other impinging bodies noted.  The arthroscopy equipment was withdrawn.        We then created a lateral incision over the posterior margin of the fibula and blunt dissection was used to carry the incision down to periosteum.  Bleeding vessels were electrocauterized and hand tied as necessary.  The incision was then carried down to bone as the sural nerve was not identified and subperiosteal  dissection was performed.  The nonunion site was readily visible and this was resected back to healthy bleeding margins with a curet, a rongeur, and then I fenestrated the bone on either side of the nonunion site with a 2.5 mm drill bit with care to avoid violating the ankle joint.  The 15 mL blood draw was spun down for the 4 mL of platelet-rich plasma.  While this was occurring, we directed our attention to the peroneal tendons.  The peroneal tendon sheath was opened.  There was a small amount of inflammatory fluid that was drained.  The peroneus brevis had some superficial degenerative changes of the tendon centrally and there was thinning of the tendon centrally but no specific tear was identified.  We debrided the nonviable portions of the tendon and elected to retubularized the tendon with a running interlocking stitch of 2-0 FiberWire.  The peroneus longus tendon was unremarkable.  There were some adhesions and scar tissue along the course of both tendons, and this was resected sharply.  The peroneal groove was assessed and found to be adequately deep without any bony prominences, so no debridement was necessary.        Attention was then directed back to the fibular fracture nonunion where the 4 mL of PRP was mixed with crushed cancellous chips and packed within the fracture nonunion site.  The Arthrex distal fibular hook plate was placed and impacted and temporarily fixated with a BB-Hany.  Imaging showed good position of the plate.  Using standard AO technique, the plate was permanently fixated using a combination of locking and nonlocking screws.  I also utilized a 4.0 mm cannulated screw through the prongs of the hook for further compression and stabilization.  Imaging again showed good placement of the hardware without violation of the ankle joint.  At this point, we then stressed the ankle joint under live image intensification, including the anterior drawer and the talar tilt and no instability was  noted.  I was able to identify the calcaneofibular ligament and this appeared unremarkable without acute or chronic pathology.  The wounds were flushed with copious amounts of normal saline.  The arthroscopy portals were closed with 4-0 nylon in the lateral incision was closed in layers with 3-0 Vicryl, 4-0 Vicryl and skin staples.      A dry sterile dressing was applied to the patient's left lower extremity.  She appeared to tolerate the procedure and anesthesia well and was transferred to the PACU with vital signs stable and vascular status intact to the left foot.      PLAN:  The patient will be nonweightbearing and will follow up with me in clinic in approximately 1 week or sooner with any acute issues.         PAOLA LAWS DPM             D: 2019   T: 2019   MT: INEZ      Name:     JUVENTINO THOMAS   MRN:      [id number]        Account:        [id number]   :      1980           Procedure Date: 2019      Document: [id number]

## 2019-04-25 ENCOUNTER — TELEPHONE (OUTPATIENT)
Dept: PODIATRY | Facility: CLINIC | Age: 39
End: 2019-04-25

## 2019-04-25 ENCOUNTER — OFFICE VISIT (OUTPATIENT)
Dept: PODIATRY | Facility: CLINIC | Age: 39
End: 2019-04-25
Payer: COMMERCIAL

## 2019-04-25 VITALS
HEIGHT: 64 IN | WEIGHT: 255 LBS | BODY MASS INDEX: 43.54 KG/M2 | DIASTOLIC BLOOD PRESSURE: 71 MMHG | SYSTOLIC BLOOD PRESSURE: 111 MMHG

## 2019-04-25 DIAGNOSIS — Z98.890 STATUS POST ORIF OF FRACTURE OF ANKLE: Primary | ICD-10-CM

## 2019-04-25 DIAGNOSIS — Z87.81 STATUS POST ORIF OF FRACTURE OF ANKLE: Primary | ICD-10-CM

## 2019-04-25 PROCEDURE — 99024 POSTOP FOLLOW-UP VISIT: CPT | Performed by: PODIATRIST

## 2019-04-25 RX ORDER — OXYCODONE AND ACETAMINOPHEN 5; 325 MG/1; MG/1
TABLET ORAL
Qty: 12 TABLET | Refills: 0 | Status: SHIPPED | OUTPATIENT
Start: 2019-04-25 | End: 2019-06-20

## 2019-04-25 ASSESSMENT — MIFFLIN-ST. JEOR: SCORE: 1819.45

## 2019-04-25 NOTE — PROGRESS NOTES
"Foot & Ankle Surgery  April 25, 2019    S:  Patient in today approx 1 week sp left ankle arthroscopy with synovectomy, ORIF/grafting distal fibular fracture nonunion, P.brevis debridement/repair.  Pain levels elevated.  She was doing well for the first few days, increased her activity a bit, and pain levels have increased.  She had to stretch her leg and had some discomfort at the surgical site after.  Following post-op instructions    /71   Ht 1.638 m (5' 4.49\")   Wt 115.7 kg (255 lb)   BMI 43.11 kg/m        ROS - positive for CC.  Patient denies current nausea, vomiting, chills, fevers, belly pain, calf pain, chest pain or SOB.  Complete remainder of ROS is otherwise neg.    PE - sutures/staples intact, skin margins well coapted and healing well.  Moderate lateral edema, wnl for this stage post-op.  Skin shows no trophic, color or temperature changes otherwise.  No calf redness, swelling or pain noted otherwise.    Imaging - post-op xrays - IMPRESSION: Postoperative change of open reduction and internal  fixation of distal fibula fracture.    A/P - 39 year old yo patient approx 1 week sp above procedure  -personally reviewed procedure and imaging with patient  -redressed foot  -continue all post-op instructions without change; reviewed  -Rx for Percocet 12 tab  -anticipate change to Aircast boot next visit, as PFS is uncomfortable for patient    Follow up  -  1 week or sooner with acute issues        Body mass index is 43.11 kg/m .  Weight management plan: Patient was referred to their PCP to discuss a diet and exercise plan.      Linus Chiu DPM FACFAS FACFAOM  Podiatric Foot & Ankle Surgeon  [facility]  [phone]    "

## 2019-04-25 NOTE — TELEPHONE ENCOUNTER
Reason for Call:  Medication or medication refill:    Do you use a [facility]?  Name of the pharmacy and phone number for the current request:    [facility] [address] [place] [address]      Name of the medication requested: the Pharmacy called asking for a call back re the Oxycodone 5-325mg    Other request: none    Can we leave a detailed message on this number? YES        Call taken on 4/25/2019 at 12:07 PM by Cipriano Cancino

## 2019-04-25 NOTE — TELEPHONE ENCOUNTER
"Spoke with pharmacy:     There are new restrictions with controlled substances per [facility] Policy-     With current sig pt could take up to 12 tabs/day which is >greater CDC recommendations.     Pharmacy requesting to ADD restriction of \"max of 6 tabs/day\" Per CDC recommendations     Please advise and triage can give verbal.     Thank you,   Nancy YUNG RN    "

## 2019-04-25 NOTE — Clinical Note
Surjit Ortiz saw Veronica today, 1 week out from her left ankle surgery.  Pain levels elevated, but otherwise she's doing well.  She was given a refill on her pain meds.  She'll continue all her post-op instructions and will follow up 1 week for suture/staple removal.ThanksLinus

## 2019-04-25 NOTE — PATIENT INSTRUCTIONS
Thank you for choosing [facility] / [facility]!    DR. LAWS'S CLINIC LOCATIONS:   MONDAY - [place] TUESDAY - [place]   [address] [facility]  [address]   [place] [address] [place] [address]   [phone]       THURSDAY AM - [place] THURSDAY PM - [facility]   [address]   [place] [address] [place], [place] [address] [phone]       FRIDAY AM - [place] SET UP SURGERY: [phone] [address] APPOINTMENTS: [phone]   [place] [address] BILLING QUESTIONS: [phone] FAX NUMBER: [phone]         Follow Up:  1 week            BODY WEIGHT AND YOUR FEET  The following information is included in the after visit summary for all patients. Body weight can be a sensitive issue to discuss in clinic, but we think the following information is very important. Although we focus on the feet and ankles, we do support the overall health of our patients.     Many things can cause foot and ankle problems. Foot structure, activity level, foot mechanics and injuries are common causes of pain. One very important issue that often goes unmentioned, is body weight. Extra weight can cause increased stress on muscles, ligaments, bones and tendons. Sometimes just a few extra pounds is all it takes to put one over her/his threshold. Without reducing that stress, it can be difficult to alleviate pain. As Foot & Ankle specialists, our job is addressing the lower extremity problem and possible causes. Regarding extra body weight, we encourage patients to discuss diet and weight management plans with their primary care doctors. It is this team approach that gives you the best opportunity for pain relief and getting you back on your feet.      [facility] has a Comprehensive Weight Management Program. This program includes counseling, education, non-surgical and surgical approaches to weight loss. If you are  interested in learning more either talk to you primary care provider or call [phone].            SMOKING CESSATION  What's in cigarette smoke? - Cigarette smoke contains over 4,000 chemicals. Nicotine is one of the main ingredients which is an insecticide/herbicide. It is poisonous to our nervous system, increases blood clotting risk, and decreases the body's defenses to fight off infection. Another chemical is Carbon Monoxide is an asphyxiating gas that permanently binds to blood cells and blocks the transport of oxygen. This leads to tissue death and decreases your metabolism. Tar is a chemical that coats your lungs and trachea which impairs new oxygen coming in and carbon dioxide getting out of your body.   How does smoking impact surgery? - Smoking is particularly hazardous with regards to surgery. Surgery puts stress on the body and a smoker's body is already under strain from these chemicals. Putting the two together, especially for an elective surgery, could be a recipe for disaster. Smoking before and after surgery increases your risk of heart problems, slow wound healing, delayed bone healing, blood clots, wound infection and anesthesia complications.   What are the benefits of quitting? - In 20 minutes your blood pressure will drop back down to normal. In 8 hours the carbon monoxide (a toxic gas) levels in your blood stream will drop by half, and oxygen levels will return to normal. In 48 hours your chance of having a heart attack will have decreased. All nicotine will have left your body. Your sense of taste and smell will return to a normal level. In 72 hours your bronchial tubes will relax, and your energy levels will increase. In 2 weeks your circulation will increase, and it will continue to improve for the next 10 weeks.    Recommendations for elective surgery - Ideally, patients should quit smoking 8 weeks before and at least 2 weeks after elective surgery in order to avoid complications.  Simply cutting back on the amount of cigarettes smoked per day does not offer any benefit or decrease the risk of poor wound healing, heart problems, and infection. Smokers should also start taking Vitamin C and B for two weeks before surgery and two weeks after surgery.    Ways to Stop Smokin. Nicotine patches, lozenges, or gum  2. Support Groups  3. Medications (see below)    List of Medications:  1. Varenicline Tartrate (CHANTIX)   2. Bupropion HCL (WELLBUTRIN, ZYBAN) - note: make sure Wellbutrin is for smoking cessation and not other issues   3. Nicotine Patch (NICODERM)   4. Nicotine Inhaler (NICOTROL)   5. Nicotine Gum Nicotine Polacrilex   6. Nicotine Lozenge: Nicotine Polacrilex (COMMIT)   * [facility] offers a smoking support group as well!  Please visit: [email or web address]  If you are interested in these, ask about getting a prescription or talk to your primary care doctor about what may be the best way for you to quit.

## 2019-05-02 ENCOUNTER — OFFICE VISIT (OUTPATIENT)
Dept: PODIATRY | Facility: CLINIC | Age: 39
End: 2019-05-02
Payer: COMMERCIAL

## 2019-05-02 VITALS — WEIGHT: 255 LBS | HEIGHT: 64 IN | BODY MASS INDEX: 43.54 KG/M2

## 2019-05-02 DIAGNOSIS — Z87.81 STATUS POST ORIF OF FRACTURE OF ANKLE: ICD-10-CM

## 2019-05-02 DIAGNOSIS — Z98.890 STATUS POST ORIF OF FRACTURE OF ANKLE: ICD-10-CM

## 2019-05-02 DIAGNOSIS — M25.531 RIGHT WRIST PAIN: Primary | ICD-10-CM

## 2019-05-02 DIAGNOSIS — Z98.890 S/P FOOT SURGERY, LEFT: ICD-10-CM

## 2019-05-02 PROCEDURE — 99024 POSTOP FOLLOW-UP VISIT: CPT | Performed by: PODIATRIST

## 2019-05-02 ASSESSMENT — MIFFLIN-ST. JEOR: SCORE: 1819.45

## 2019-05-02 NOTE — PROGRESS NOTES
"Foot & Ankle Surgery  May 2, 2019    S:  Patient in today approx 2 weeks sp left ankle arthrsocopy with synovectomy, ORIF/grafting fib fracture non-union and P.brevis repair.  Pain levels improved from last visit.  Has some questions about her post-op paperwork, including RTW date.  She's also having some R wrist pain, possibly from using RollAbout, but it's worsening    Ht 1.638 m (5' 4.49\")   Wt 115.7 kg (255 lb)   BMI 43.11 kg/m        ROS - positive for CC.  Patient denies current nausea, vomiting, chills, fevers, belly pain, calf pain, chest pain or SOB.  Complete remainder of ROS is otherwise neg.    PE - sutures/staples removed, s-s applied.  Mild edema, wnl for this stage post-op.  No SOI.  Skin shows no trophic, color or temperature changes otherwise.  No calf redness, swelling or pain noted otherwise.    A/P - 39 year old yo patient approx 2 weeks sp above procedure  -sutures/staples removed, s-s applied, but incision healing well; remove in 1 week if still present  -continue - NWB, ok to transition to Aircast boot; compression, tensogrip dispensed; DVT exercises  -change - ice/elevate prn; ok to increase activities to tolerance; ok to wash tomorrow but no soaking/submerging x 1 week  -paperwork adjusted to show RTW for NWB/seated duties starting 5/3/19 instead of 5/1/19.  -Sports Med referral for R wrist pain  -start 10m TID ROM exercises L ankle, discussed  -4 week follow up - films, possible protected WB and [facility] PT referral    Follow up  -  4 weeks or sooner with acute issues    Plan for clhcni-ct-ihoh - 5/3/19     Body mass index is 43.11 kg/m .  Weight management plan: Patient was referred to their PCP to discuss a diet and exercise plan.      Linus Chiu, WILBERTM FACFAS FACFAOM  Podiatric Foot & Ankle Surgeon  [facility]  [phone]    "

## 2019-05-02 NOTE — PATIENT INSTRUCTIONS
Thank you for choosing [facility] / [facility]!    DR. LAWS'S CLINIC LOCATIONS:   MONDAY - [place] TUESDAY - [place]   [address] [facility]  [address] [facility] [address]   [place] [address] [place] [address]   [phone]       THURSDAY AM - [place] THURSDAY PM - [facility]   [address]   [place] [address] [place], [place] [address] [phone]       FRIDAY AM - [place] SET UP SURGERY: [phone] [address] APPOINTMENTS: [phone]   [place] [address] BILLING QUESTIONS: [phone] FAX NUMBER: [phone]     Follow Up:  4 weeks            BODY WEIGHT AND YOUR FEET  The following information is included in the after visit summary for all patients. Body weight can be a sensitive issue to discuss in clinic, but we think the following information is very important. Although we focus on the feet and ankles, we do support the overall health of our patients.     Many things can cause foot and ankle problems. Foot structure, activity level, foot mechanics and injuries are common causes of pain. One very important issue that often goes unmentioned, is body weight. Extra weight can cause increased stress on muscles, ligaments, bones and tendons. Sometimes just a few extra pounds is all it takes to put one over her/his threshold. Without reducing that stress, it can be difficult to alleviate pain. As Foot & Ankle specialists, our job is addressing the lower extremity problem and possible causes. Regarding extra body weight, we encourage patients to discuss diet and weight management plans with their primary care doctors. It is this team approach that gives you the best opportunity for pain relief and getting you back on your feet.      [facility] has a Comprehensive Weight Management Program. This program includes counseling, education, non-surgical and surgical approaches to weight loss. If you are interested  in learning more either talk to you primary care provider or call [phone].                SMOKING CESSATION  What's in cigarette smoke? - Cigarette smoke contains over 4,000 chemicals. Nicotine is one of the main ingredients which is an insecticide/herbicide. It is poisonous to our nervous system, increases blood clotting risk, and decreases the body's defenses to fight off infection. Another chemical is Carbon Monoxide is an asphyxiating gas that permanently binds to blood cells and blocks the transport of oxygen. This leads to tissue death and decreases your metabolism. Tar is a chemical that coats your lungs and trachea which impairs new oxygen coming in and carbon dioxide getting out of your body.   How does smoking impact surgery? - Smoking is particularly hazardous with regards to surgery. Surgery puts stress on the body and a smoker's body is already under strain from these chemicals. Putting the two together, especially for an elective surgery, could be a recipe for disaster. Smoking before and after surgery increases your risk of heart problems, slow wound healing, delayed bone healing, blood clots, wound infection and anesthesia complications.   What are the benefits of quitting? - In 20 minutes your blood pressure will drop back down to normal. In 8 hours the carbon monoxide (a toxic gas) levels in your blood stream will drop by half, and oxygen levels will return to normal. In 48 hours your chance of having a heart attack will have decreased. All nicotine will have left your body. Your sense of taste and smell will return to a normal level. In 72 hours your bronchial tubes will relax, and your energy levels will increase. In 2 weeks your circulation will increase, and it will continue to improve for the next 10 weeks.    Recommendations for elective surgery - Ideally, patients should quit smoking 8 weeks before and at least 2 weeks after elective surgery in order to avoid complications. Simply  cutting back on the amount of cigarettes smoked per day does not offer any benefit or decrease the risk of poor wound healing, heart problems, and infection. Smokers should also start taking Vitamin C and B for two weeks before surgery and two weeks after surgery.    Ways to Stop Smokin. Nicotine patches, lozenges, or gum  2. Support Groups  3. Medications (see below)    List of Medications:  1. Varenicline Tartrate (CHANTIX)   2. Bupropion HCL (WELLBUTRIN, ZYBAN) - note: make sure Wellbutrin is for smoking cessation and not other issues   3. Nicotine Patch (NICODERM)   4. Nicotine Inhaler (NICOTROL)   5. Nicotine Gum Nicotine Polacrilex   6. Nicotine Lozenge: Nicotine Polacrilex (COMMIT)   * [facility] offers a smoking support group as well!  Please visit: [email or web address]  If you are interested in these, ask about getting a prescription or talk to your primary care doctor about what may be the best way for you to quit.

## 2019-05-06 ENCOUNTER — OFFICE VISIT (OUTPATIENT)
Dept: ORTHOPEDICS | Facility: CLINIC | Age: 39
End: 2019-05-06
Payer: COMMERCIAL

## 2019-05-06 VITALS
WEIGHT: 255 LBS | SYSTOLIC BLOOD PRESSURE: 136 MMHG | DIASTOLIC BLOOD PRESSURE: 90 MMHG | HEIGHT: 65 IN | BODY MASS INDEX: 42.49 KG/M2

## 2019-05-06 DIAGNOSIS — S66.911A MUSCLE STRAIN OF RIGHT WRIST, INITIAL ENCOUNTER: Primary | ICD-10-CM

## 2019-05-06 PROCEDURE — 99203 OFFICE O/P NEW LOW 30 MIN: CPT | Performed by: FAMILY MEDICINE

## 2019-05-06 ASSESSMENT — MIFFLIN-ST. JEOR: SCORE: 1819.61

## 2019-05-06 NOTE — LETTER
5/6/2019         RE: Anders Zepeda  [address]  [facility] [address]        Dear Colleague,    Thank you for referring your patient, Anders Zepeda, to the [facility]. Please see a copy of my visit note below.    ASSESSMENT & PLAN  Patient Instructions     1. Muscle strain of right wrist, initial encounter      -Patient has a right wrist pain due to a muscle strain from repetitive pushing with the wrist in full extension.  -Patient will purchase an over-the-counter cock-up wrist splint on [employer].  Patient was shown an excellent DonJoy wrist brace to purchase.  -Patient will start formal hand therapy and home exercise program.  -She may continue with ibuprofen as needed.  -Patient will follow-up in 4 weeks for reevaluation and progression of activity  -Patient may return to work as directed by Dr. Gaming.  -Call direct clinic number [[phone]] at any time with questions or concerns.    Albert Yeo MD [facility] Orthopedics and Sports Medicine  [facility]          -----    SUBJECTIVE  Anders Zepeda is a/an 39 year old Right handed female who is seen in consultation at the request of  Linus Chiu DPM for evaluation of right wrist pain. The patient is seen by themselves.    Onset: 2 week(s) ago. Reports insidious onset without acute trauma. Patient does note that pain began shortly after she had surgery on her left foot. Patient has been using a knee scooter to ambulate and relying on her upper body for transferring from one position to another.  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    Past Medical History:   Diagnosis Date     Ankle sprain      Social History     Socioeconomic History     Marital status:      Spouse name: Not on file     Number of children: Not on file     Years of education: Not on file     Highest education level: Not on file   Occupational History     Not on file   Social Needs     Financial resource strain: Not on file     Food insecurity:     Worry: Not on file     Inability: Not on file     Transportation needs:     Medical: Not on file     Non-medical: Not on file   Tobacco Use     Smoking status: Current Every Day Smoker     Packs/day: 1.00     Years: 25.00     Pack years: 25.00     Smokeless tobacco: Never Used   Substance and Sexual Activity     Alcohol use: Yes     Comment: 2 drinks per month     Drug use: Not Currently     Sexual activity: Not on file   Lifestyle     Physical activity:     Days per week: Not on file     Minutes per session: Not on file     Stress: Not on file   Relationships     Social connections:     Talks on phone: Not on file     Gets together: Not on file     Attends [employer] service: Not on file     Active member of club or organization: Not on file     Attends meetings of clubs or organizations: Not on file     Relationship status: Not on file     Intimate partner violence:     Fear of current or ex partner: Not on file     Emotionally abused: Not on file     Physically abused: Not on file     Forced sexual activity: Not on file   Other Topics Concern     Not on file   Social History Narrative     Not on file         Patient's past medical, surgical, social, and family histories were reviewed today and no changes are noted.    REVIEW OF SYSTEMS:  10 point ROS is negative other than symptoms noted above in HPI, Past Medical History or as stated below  Constitutional: NEGATIVE for  "fever, chills, change in weight  Skin: NEGATIVE for worrisome rashes, moles or lesions  GI/: NEGATIVE for bowel or bladder changes  Neuro: NEGATIVE for weakness, dizziness or paresthesias    OBJECTIVE:  /90   Ht 1.638 m (5' 4.5\")   Wt 115.7 kg (255 lb)   BMI 43.09 kg/m      General: healthy, alert and in no distress  HEENT: no scleral icterus or conjunctival erythema  Skin: no suspicious lesions or rash. No jaundice.  CV: regular rhythm by palpation  Resp: normal respiratory effort without conversational dyspnea   Psych: normal mood and affect  Gait: normal steady gait with appropriate coordination and balance  Neuro: Normal sensory exam of bilateral hands. Normal 2 pt discrimination.   MSK:  RIGHT WRIST  Inspection:    No swelling or obvious deformity or asymmetry  Palpation:    Tender about the TFCC and extensor tendons. Remainder of bony and ligamentous line marks are nontender.     Metacarpals: normal    Thumb: normal    Fingers: normal  Range of Motion:    Full (active and passive) flexion, extension, pronation/supination, and ulnar/radial deviation.  Strength:     strength limited slightly by pain flexion limited slightly by pain extension limited slightly by pain radial deviation limited slightly by pain. Normal pinch strength.  Special Tests:    Positive: None    Negative: Phalen's, Tinel's (carpal tunnel), Finkelstein's, reverse Prayer's, thenar eminence wasting, hypothenar eminence wasting.     Independent visualization of the below image:  No results found for this or any previous visit (from the past 24 hour(s)).          Albert Yeo MD [facility]      Again, thank you for allowing me to participate in the care of your patient.        Sincerely,        Albert Yeo, MD    "

## 2019-05-06 NOTE — PROGRESS NOTES
ASSESSMENT & PLAN  Patient Instructions     1. Muscle strain of right wrist, initial encounter      -Patient has a right wrist pain due to a muscle strain from repetitive pushing with the wrist in full extension.  -Patient will purchase an over-the-counter cock-up wrist splint on [employer].  Patient was shown an excellent DonJoy wrist brace to purchase.  -Patient will start formal hand therapy and home exercise program.  -She may continue with ibuprofen as needed.  -Patient will follow-up in 4 weeks for reevaluation and progression of activity  -Patient may return to work as directed by Dr. Gaming.  -Call direct clinic number [[phone]] at any time with questions or concerns.    Albert Yeo MD [facility] Orthopedics and Sports Medicine  [facility]          -----    SUBJECTIVE  Anders Zepeda is a/an 39 year old Right handed female who is seen in consultation at the request of  Linus Chiu DPM for evaluation of right wrist pain. The patient is seen by themselves.    Onset: 2 week(s) ago. Reports insidious onset without acute trauma. Patient does note that pain began shortly after she had surgery on her left foot. Patient has been using a knee scooter to ambulate and relying on her upper body for transferring from one position to another.  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    Past Medical History:   Diagnosis Date     Ankle sprain      Social History      Socioeconomic History     Marital status:      Spouse name: Not on file     Number of children: Not on file     Years of education: Not on file     Highest education level: Not on file   Occupational History     Not on file   Social Needs     Financial resource strain: Not on file     Food insecurity:     Worry: Not on file     Inability: Not on file     Transportation needs:     Medical: Not on file     Non-medical: Not on file   Tobacco Use     Smoking status: Current Every Day Smoker     Packs/day: 1.00     Years: 25.00     Pack years: 25.00     Smokeless tobacco: Never Used   Substance and Sexual Activity     Alcohol use: Yes     Comment: 2 drinks per month     Drug use: Not Currently     Sexual activity: Not on file   Lifestyle     Physical activity:     Days per week: Not on file     Minutes per session: Not on file     Stress: Not on file   Relationships     Social connections:     Talks on phone: Not on file     Gets together: Not on file     Attends [employer] service: Not on file     Active member of club or organization: Not on file     Attends meetings of clubs or organizations: Not on file     Relationship status: Not on file     Intimate partner violence:     Fear of current or ex partner: Not on file     Emotionally abused: Not on file     Physically abused: Not on file     Forced sexual activity: Not on file   Other Topics Concern     Not on file   Social History Narrative     Not on file         Patient's past medical, surgical, social, and family histories were reviewed today and no changes are noted.    REVIEW OF SYSTEMS:  10 point ROS is negative other than symptoms noted above in HPI, Past Medical History or as stated below  Constitutional: NEGATIVE for fever, chills, change in weight  Skin: NEGATIVE for worrisome rashes, moles or lesions  GI/: NEGATIVE for bowel or bladder changes  Neuro: NEGATIVE for weakness, dizziness or paresthesias    OBJECTIVE:  /90   Ht 1.638 m (5'  "4.5\")   Wt 115.7 kg (255 lb)   BMI 43.09 kg/m     General: healthy, alert and in no distress  HEENT: no scleral icterus or conjunctival erythema  Skin: no suspicious lesions or rash. No jaundice.  CV: regular rhythm by palpation  Resp: normal respiratory effort without conversational dyspnea   Psych: normal mood and affect  Gait: normal steady gait with appropriate coordination and balance  Neuro: Normal sensory exam of bilateral hands. Normal 2 pt discrimination.   MSK:  RIGHT WRIST  Inspection:    No swelling or obvious deformity or asymmetry  Palpation:    Tender about the TFCC and extensor tendons. Remainder of bony and ligamentous line marks are nontender.     Metacarpals: normal    Thumb: normal    Fingers: normal  Range of Motion:    Full (active and passive) flexion, extension, pronation/supination, and ulnar/radial deviation.  Strength:     strength limited slightly by pain flexion limited slightly by pain extension limited slightly by pain radial deviation limited slightly by pain. Normal pinch strength.  Special Tests:    Positive: None    Negative: Phalen's, Tinel's (carpal tunnel), Finkelstein's, reverse Prayer's, thenar eminence wasting, hypothenar eminence wasting.     Independent visualization of the below image:  No results found for this or any previous visit (from the past 24 hour(s)).          Albert Yeo MD [facility] Orthopedic Care    "

## 2019-05-06 NOTE — PATIENT INSTRUCTIONS
1. Muscle strain of right wrist, initial encounter      -Patient has a right wrist pain due to a muscle strain from repetitive pushing with the wrist in full extension.  -Patient will purchase an over-the-counter cock-up wrist splint on [employer].  Patient was shown an excellent DonJoy wrist brace to purchase.  -Patient will start formal hand therapy and home exercise program.  -She may continue with ibuprofen as needed.  -Patient will follow-up in 4 weeks for reevaluation and progression of activity  -Patient may return to work as directed by Dr. Gaming.  -Call direct clinic number [[phone]] at any time with questions or concerns.    Albert Yeo MD [facility] Orthopedics and Sports Medicine  [facility] [place]

## 2019-05-15 ENCOUNTER — THERAPY VISIT (OUTPATIENT)
Dept: OCCUPATIONAL THERAPY | Facility: CLINIC | Age: 39
End: 2019-05-15
Attending: FAMILY MEDICINE
Payer: COMMERCIAL

## 2019-05-15 DIAGNOSIS — S66.911A MUSCLE STRAIN OF RIGHT WRIST, INITIAL ENCOUNTER: ICD-10-CM

## 2019-05-15 DIAGNOSIS — M25.531 RIGHT WRIST PAIN: ICD-10-CM

## 2019-05-15 PROCEDURE — 97110 THERAPEUTIC EXERCISES: CPT | Mod: GO | Performed by: OCCUPATIONAL THERAPIST

## 2019-05-15 PROCEDURE — 97165 OT EVAL LOW COMPLEX 30 MIN: CPT | Mod: GO | Performed by: OCCUPATIONAL THERAPIST

## 2019-05-15 PROCEDURE — 97140 MANUAL THERAPY 1/> REGIONS: CPT | Mod: GO | Performed by: OCCUPATIONAL THERAPIST

## 2019-05-15 NOTE — PROGRESS NOTES
Hand Therapy Initial Evaluation  Current Date:  5/15/2019    Subjective:  Anders Zepeda is a 39 year old right hand dominant female.    Diagnosis: R wrist muscle strain  DOI:  1 month ago (MD order date 19)    Patient reports symptoms of pain and weakness/loss of strength of the right wrist which occurred due to repetitive pushing activity after ankle surgery. Since onset symptoms are unchanged, daily activity is improved with use of brace. Special tests:  none.  Previous treatment: wrist brace (helps a lot). General health as reported by patient is fair.  Pertinent medical history includes: Anemia, Mental Illness, Migraines/Headaches, Overweight.  Medical allergies: none.  Surgical history: other:  x2, hernia repair, ankle repair (2019).  Medication history: Anti-inflammatory, Vitamin D.    Occupational Profile Information:  Current occupation is  ([employer])  Currently working in normal job with restrictions (seated due to recent ankle surgery)  Job Tasks: Prolonged Sitting, Repetitive Tasks  Prior functional level:  no limitations  Barriers include:none  Mobility: Ambulates with aid of scooter due to recent ankle surgery  Transportation: drives  Leisure activities/hobbies: reading    Upper Extremity Functional Index Score:  SCORE:   Column Totals: /80: 63   (A lower score indicates greater disability.)    Objective:  Pain Level (Scale 0-10):   5/15/2019   At Rest 0   With Use 7     Pain Description:  Date 5/15/2019   Location R ulnar wrist and distal forearm, will radiate to elbow if in supination with a weight   Pain Quality Burning and Sharp   Frequency intermittent     Pain is worst  daytime or nighttime   Exacerbated by  activity--supination with weight in hand, WB   Relieved by rest and brace   Progression unchanged     Tenderness: Pain level report on scale 0-10/10  Date 5/15/2019    Side R    Ulna Styloid 0    TFCC 0    Distal Radius 0    Radial Styloid 0    DRUJ 2    Volar Scaphoid  0    Dorsal Scaphoid 0    Volar Lunate 0    Dorsal Lunate 0    Dorsal Triquetrum 4-5    ECU tendon insertion 3    ECU muscle belly 8      Edema:  Circumference (measured in cm)  Wrist/Elbow  Date 5/15/2019 5/15/2019   Side R L   DWC  16.4 16.6     Range of Motion Wrist AROM (PROM): WNL per visual observation.  Pain present with end ranges of motion    Special Tests: pain scale of 0-10/10, MMT scale of 0-5/5  Date 5/15/2019    Side R    ULTT ulnar nerve bias ~40%    PROM of Wrist Flexion and Radial Deviation 7/10    MMT of ECU Muscle 0/10, 5/5    ECU Synergy Test +, 7/10      STRENGTH: (Measured in pounds, pain scale 0-10/10)    Date 5/15/2019        Trials Left Right Left Right Left Right Left Right Left Right Left Right   1 64 47             2               3               Avg               Pain                 3 Point Pinch  Date 5/15/2019        Trials Left Right Left Right Left Right Left Right Left Right Left Right   1 14 11             2               3               Avg               Pain                 Lateral Pinch  Date 5/15/2019        Trials Left Right Left Right Left Right Left Right Left Right Left Right   1 14 12             2               3               Avg               Pain                 Assessment:  Patient presents with symptoms consistent with diagnosis of right wrist muscle strain, with conservative intervention.     Patient's limitations or Problem List includes:  Pain, Decreased ROM/motion, Weakness, Decreased  and Tightness in musculature of the right wrist which interferes with the patient's ability to perform Recreational Activities, Household Chores and Driving  as compared to previous level of function.    Rehab Potential:  Excellent - Return to full activity, no limitations    Patient will benefit from skilled Occupational Therapy to increase ROM, wrist stability,  strength and forearm strength and decrease pain to return to previous activity level and resume normal  daily tasks and to reach their rehab potential.    Barriers to Learning:  No barrier    Communication Issues:  Patient appears to be able to clearly communicate and understand verbal and written communication and follow directions correctly.    Chart Review: Chart Review, Brief history including review of medical and/or therapy records relating to the presenting problem and Simple history review with patient    Assessment of Occupational Performance:  3-5 Performance Deficits  Identified Performance Deficits: driving and community mobility, health management and maintenance, home establishment and management and shopping      Clinical Decision Making (Complexity): Low complexity    Treatment Explanation:  The following has been discussed with the patient:    RX ordered/plan of care  Anticipated outcomes  Possible risks and side effects    P: Frequency:  1 X week, once daily  Duration:  for 6 weeks    Treatment Plan:    Modalities:  US   Therapeutic Exercise: AROM of wrist, PROM with stretch to wrist extensors and flexors,  Wrist stability program, isometrics  Neuromuscular Techniques: Closed chain exercises, proprioception  Manual Techniques:, Myofascial release of the forearm extensors and flexors, wrist mobilization techniques  Orthosis:  Static orthosis and Forearm based orthosis    Home Program:   Exercise: AROM of wrist,  PROM with stretch to wrist extensors and flexors.  Wrist stability program--begin with door frame pull backs and wall push-ups  Orthosis: Cullen Thumboform   Activity: Avoid activities that exacerbate symptoms in the median nerve.  Avoid prolonged flexion or extension of the wrist.    Discharge Plan:    Achieve all LTG.  Independent in home treatment program.  Reach maximal therapeutic benefit.    Next Visit:  MFR  Passive ECU stretch

## 2019-05-22 ENCOUNTER — THERAPY VISIT (OUTPATIENT)
Dept: OCCUPATIONAL THERAPY | Facility: CLINIC | Age: 39
End: 2019-05-22
Payer: COMMERCIAL

## 2019-05-22 DIAGNOSIS — M25.531 RIGHT WRIST PAIN: ICD-10-CM

## 2019-05-22 PROCEDURE — 97110 THERAPEUTIC EXERCISES: CPT | Mod: GO | Performed by: OCCUPATIONAL THERAPIST

## 2019-05-22 PROCEDURE — 97140 MANUAL THERAPY 1/> REGIONS: CPT | Mod: GO | Performed by: OCCUPATIONAL THERAPIST

## 2019-05-24 PROBLEM — M25.531 RIGHT WRIST PAIN: Status: ACTIVE | Noted: 2019-05-24

## 2019-05-29 ENCOUNTER — THERAPY VISIT (OUTPATIENT)
Dept: OCCUPATIONAL THERAPY | Facility: CLINIC | Age: 39
End: 2019-05-29
Payer: COMMERCIAL

## 2019-05-29 DIAGNOSIS — M25.531 RIGHT WRIST PAIN: ICD-10-CM

## 2019-05-29 PROCEDURE — 97140 MANUAL THERAPY 1/> REGIONS: CPT | Mod: GO | Performed by: OCCUPATIONAL THERAPIST

## 2019-05-29 PROCEDURE — 97110 THERAPEUTIC EXERCISES: CPT | Mod: GO | Performed by: OCCUPATIONAL THERAPIST

## 2019-05-30 ENCOUNTER — OFFICE VISIT (OUTPATIENT)
Dept: PODIATRY | Facility: CLINIC | Age: 39
End: 2019-05-30
Payer: COMMERCIAL

## 2019-05-30 ENCOUNTER — ANCILLARY PROCEDURE (OUTPATIENT)
Dept: GENERAL RADIOLOGY | Facility: CLINIC | Age: 39
End: 2019-05-30
Attending: PODIATRIST
Payer: COMMERCIAL

## 2019-05-30 VITALS
HEIGHT: 65 IN | SYSTOLIC BLOOD PRESSURE: 136 MMHG | WEIGHT: 255 LBS | BODY MASS INDEX: 42.49 KG/M2 | DIASTOLIC BLOOD PRESSURE: 90 MMHG

## 2019-05-30 DIAGNOSIS — Z98.890 STATUS POST ORIF OF FRACTURE OF ANKLE: ICD-10-CM

## 2019-05-30 DIAGNOSIS — Z87.81 STATUS POST ORIF OF FRACTURE OF ANKLE: Primary | ICD-10-CM

## 2019-05-30 DIAGNOSIS — Z87.81 STATUS POST ORIF OF FRACTURE OF ANKLE: ICD-10-CM

## 2019-05-30 DIAGNOSIS — Z98.890 STATUS POST ORIF OF FRACTURE OF ANKLE: Primary | ICD-10-CM

## 2019-05-30 PROCEDURE — 73610 X-RAY EXAM OF ANKLE: CPT | Mod: LT

## 2019-05-30 PROCEDURE — 99024 POSTOP FOLLOW-UP VISIT: CPT | Performed by: PODIATRIST

## 2019-05-30 ASSESSMENT — MIFFLIN-ST. JEOR: SCORE: 1819.61

## 2019-05-30 NOTE — PATIENT INSTRUCTIONS
Thank you for choosing [facility]!    DR. LAWS'S CLINIC LOCATIONS:   MONDAY - [place] TUESDAY - [place]   [address] [facility]  [address] [facility] [address]   [place] [address] [place] [address]   [phone]       THURSDAY AM - [place] THURSDAY PM - [place]   [address]   [place] [address] [place], [place] [address] [phone]       FRIDAY AM - [place] SET UP SURGERY: [phone] [address] APPOINTMENTS: [phone]   [place] [address] BILLING QUESTIONS: [phone] FAX NUMBER: [phone]         Follow Up: 4 weeks      Products available online if you do not have insurance coverage:    1.  Iodosorb topical wound care gel     2.  Woun'Dres topical wound care gel                      3.  Topical lidocaine gel          BODY WEIGHT AND YOUR FEET  The following information is included in the after visit summary for all patients. Body weight can be a sensitive issue to discuss in clinic, but we think the following information is very important. Although we focus on the feet and ankles, we do support the overall health of our patients.     Many things can cause foot and ankle problems. Foot structure, activity level, foot mechanics and injuries are common causes of pain. One very important issue that often goes unmentioned, is body weight. Extra weight can cause increased stress on muscles, ligaments, bones and tendons. Sometimes just a few extra pounds is all it takes to put one over her/his threshold. Without reducing that stress, it can be difficult to alleviate pain. As Foot & Ankle specialists, our job is addressing the lower extremity problem and possible causes. Regarding extra body weight, we encourage patients to discuss diet and weight management plans with their primary care doctors. It is this team approach that gives you the best opportunity for pain relief and getting you  back on your feet.      [facility] has a Comprehensive Weight Management Program. This program includes counseling, education, non-surgical and surgical approaches to weight loss. If you are interested in learning more either talk to you primary care provider or call [phone].    Veronica to follow up with Primary Care provider regarding elevated blood pressure.

## 2019-06-03 ENCOUNTER — THERAPY VISIT (OUTPATIENT)
Dept: OCCUPATIONAL THERAPY | Facility: CLINIC | Age: 39
End: 2019-06-03
Payer: COMMERCIAL

## 2019-06-03 DIAGNOSIS — M25.531 RIGHT WRIST PAIN: ICD-10-CM

## 2019-06-03 PROCEDURE — 97110 THERAPEUTIC EXERCISES: CPT | Mod: GO | Performed by: OCCUPATIONAL THERAPIST

## 2019-06-03 PROCEDURE — 97140 MANUAL THERAPY 1/> REGIONS: CPT | Mod: GO | Performed by: OCCUPATIONAL THERAPIST

## 2019-06-11 ENCOUNTER — THERAPY VISIT (OUTPATIENT)
Dept: PHYSICAL THERAPY | Facility: CLINIC | Age: 39
End: 2019-06-11
Attending: PODIATRIST
Payer: COMMERCIAL

## 2019-06-11 ENCOUNTER — THERAPY VISIT (OUTPATIENT)
Dept: OCCUPATIONAL THERAPY | Facility: CLINIC | Age: 39
End: 2019-06-11
Payer: COMMERCIAL

## 2019-06-11 DIAGNOSIS — R60.9 EDEMA: Primary | ICD-10-CM

## 2019-06-11 DIAGNOSIS — Z87.81 STATUS POST ORIF OF FRACTURE OF ANKLE: ICD-10-CM

## 2019-06-11 DIAGNOSIS — Z98.890 STATUS POST ORIF OF FRACTURE OF ANKLE: ICD-10-CM

## 2019-06-11 DIAGNOSIS — M25.531 RIGHT WRIST PAIN: ICD-10-CM

## 2019-06-11 PROCEDURE — 97161 PT EVAL LOW COMPLEX 20 MIN: CPT | Mod: GP | Performed by: PHYSICAL THERAPIST

## 2019-06-11 PROCEDURE — 97140 MANUAL THERAPY 1/> REGIONS: CPT | Mod: GO | Performed by: OCCUPATIONAL THERAPIST

## 2019-06-11 PROCEDURE — 97110 THERAPEUTIC EXERCISES: CPT | Mod: GO | Performed by: OCCUPATIONAL THERAPIST

## 2019-06-11 PROCEDURE — 97110 THERAPEUTIC EXERCISES: CPT | Mod: GP | Performed by: PHYSICAL THERAPIST

## 2019-06-11 PROCEDURE — 97016 VASOPNEUMATIC DEVICE THERAPY: CPT | Mod: GP | Performed by: PHYSICAL THERAPIST

## 2019-06-11 NOTE — PROGRESS NOTES
[facility] Initial Evaluation  Anders Zepeda is a 39 year old  female referred to physical therapy by Dr. Aram DPM for treatment of left foot/ankle pain s/p surgery with Precautions/Restrictions/MD instructions eval and treat (8 sessions - ROM, strengtheningexercises, HEP for functional rehab.  start with NWB exercises, progress to WB as tolerated)     Physical Therapy Initial Evaluation: Subjective History      Injury/Condition Details:  Presenting Complaint Left foot/ankle pain, decreased ROM, and strength secondary to left ankle surgery   Onset Timing/Date DOS: 4/17/9 - left ankle arthroscopy with synovectomy, ORIF/grafting fib fracture nonunion and P.brevis repair.    Mechanism Initial injury occurred July 2018 secondary to distal fibular fracture nonuinion after stepping off a curb       Symptom Behavior Details    Primary Pain Symptoms Location: Left anterior and lateral ankle pain  Quality: ache sometimes sharp, burning  Frequency: Constant   Worst Pain 6/10   Best Pain 2/10   Symptom Provocators Weight bearing, walking   Symptom Relievers Rest, ice, medication   Time of day dependent? Worse during the day   Recent symptom change? Small improvement since surgery      Prior Testing/Intervention for current condition:  Prior Tests X-ray of left ankle indicating:  IMPRESSION: Plate and screw fixation in the distal LEFT fibula. Fracture is in unchanged alignment. No new fractures are seen. Ankle mortise is congruent and joint spaces are preserved.   Prior Treatment None      Lifestyle & General Medical History:  General Health Reported by Patient good   Employment Assembly   Orthopaedic history None   Notable medical history Anemia, mental illness, migraines/illness, overweight, smoking, severe headaches     HPI                    Objective:    Gait:  Assistive device: Roll About Scooter    Weight Bearing Status:  WBAT               Ankle/Foot Evaluation  ROM:    AROM:     Dorsiflexion:  Left:   10  Right:   20  Plantarflexion:  Left:  35    Right:  50  Inversion:  Left:  25     Right:  30  Eversion:  10     Right:  20        Strength:    Dorsiflexion:  Left: 4/5     Pain:   Right: 5/5   Pain:  Plantarflexion: Left: 4/5   Pain:   Right: 5/5  Pain:  Inversion:Left: 4/5  Pain:     Right: 5/5  Pain:  Eversion:Left: 4/5  Pain:  Right: 5/5  Pain:                      PALPATION:     Left ankle tenderness not present at:   gastroc/soleus or peroneals    EDEMA:   Left ankle edema present at: 65.0  Right ankle edema present at:  63.5      Figure 8 left: 65.0Figure 8 right: 63.5                                                      General     ROS    Assessment/Plan:    Patient is a 39 year old female with left side ankle complaints.    Patient has the following significant findings with corresponding treatment plan.                Diagnosis 1:  S/P Left ankle surgery  Pain -  manual therapy, splint/taping/bracing/orthotics, self management, education and home program  Decreased ROM/flexibility - manual therapy, therapeutic exercise, therapeutic activity and home program  Decreased strength - therapeutic exercise, therapeutic activities and home program  Impaired balance - neuro re-education, therapeutic activities and home program  Decreased proprioception - neuro re-education, therapeutic activities and home program  Impaired muscle performance - neuro re-education and home program    Therapy Evaluation Codes:   1) History comprised of:   Personal factors that impact the plan of care:      None.    Comorbidity factors that impact the plan of care are:      Mental illness, Migraines/headaches, Overweight and Smoking.     Medications impacting care: Anti-inflammatory.  2) Examination of Body Systems comprised of:   Body structures and functions that impact the plan of care:      Ankle.   Activity limitations that impact the plan of care are:      Lifting, Stairs, Standing, Walking and  Working.  3) Clinical presentation characteristics are:   Stable/Uncomplicated.  4) Decision-Making    Low complexity using standardized patient assessment instrument and/or measureable assessment of functional outcome.  Cumulative Therapy Evaluation is: Low complexity.    Previous and current functional limitations:  (See Goal Flow Sheet for this information)    Short term and Long term goals: (See Goal Flow Sheet for this information)     Communication ability:  Patient appears to be able to clearly communicate and understand verbal and written communication and follow directions correctly.  Treatment Explanation - The following has been discussed with the patient:   RX ordered/plan of care  Anticipated outcomes  Possible risks and side effects  This patient would benefit from PT intervention to resume normal activities.   Rehab potential is good.    Frequency:  1 X week, once daily  Duration:  for 10 weeks  Discharge Plan:  Achieve all LTG.  Independent in home treatment program.  Reach maximal therapeutic benefit.    Please refer to the daily flowsheet for treatment today, total treatment time and time spent performing 1:1 timed codes.

## 2019-06-12 PROBLEM — Z98.890 STATUS POST ORIF OF FRACTURE OF ANKLE: Status: ACTIVE | Noted: 2019-06-12

## 2019-06-12 PROBLEM — R60.9 EDEMA: Status: ACTIVE | Noted: 2019-06-12

## 2019-06-12 PROBLEM — Z87.81 STATUS POST ORIF OF FRACTURE OF ANKLE: Status: ACTIVE | Noted: 2019-06-12

## 2019-06-20 ENCOUNTER — OFFICE VISIT (OUTPATIENT)
Dept: FAMILY MEDICINE | Facility: CLINIC | Age: 39
End: 2019-06-20
Payer: COMMERCIAL

## 2019-06-20 VITALS
SYSTOLIC BLOOD PRESSURE: 119 MMHG | BODY MASS INDEX: 42.49 KG/M2 | HEART RATE: 91 BPM | OXYGEN SATURATION: 99 % | TEMPERATURE: 98.8 F | DIASTOLIC BLOOD PRESSURE: 74 MMHG | HEIGHT: 65 IN | WEIGHT: 255 LBS

## 2019-06-20 DIAGNOSIS — R05.9 COUGH: ICD-10-CM

## 2019-06-20 DIAGNOSIS — R09.82 POST-NASAL DRIP: Primary | ICD-10-CM

## 2019-06-20 DIAGNOSIS — R09.81 NASAL CONGESTION: ICD-10-CM

## 2019-06-20 PROCEDURE — 99213 OFFICE O/P EST LOW 20 MIN: CPT | Performed by: INTERNAL MEDICINE

## 2019-06-20 RX ORDER — FLUTICASONE PROPIONATE 50 MCG
1 SPRAY, SUSPENSION (ML) NASAL DAILY
Qty: 18.2 ML | Refills: 0 | Status: SHIPPED | OUTPATIENT
Start: 2019-06-20 | End: 2019-08-08

## 2019-06-20 RX ORDER — CETIRIZINE HYDROCHLORIDE 10 MG/1
10 TABLET ORAL DAILY
Qty: 30 TABLET | Refills: 0 | Status: SHIPPED | OUTPATIENT
Start: 2019-06-20 | End: 2019-08-08

## 2019-06-20 RX ORDER — PSEUDOEPHEDRINE HCL 30 MG
30 TABLET ORAL EVERY 6 HOURS PRN
Qty: 16 TABLET | Refills: 0 | Status: SHIPPED | OUTPATIENT
Start: 2019-06-20 | End: 2019-08-08

## 2019-06-20 ASSESSMENT — MIFFLIN-ST. JEOR: SCORE: 1819.61

## 2019-06-20 NOTE — PROGRESS NOTES
Subjective     Anders Zepeda is a 39 year old female who presents to clinic today for the following health issues:    HPI   RESPIRATORY SYMPTOMS      Duration: 2 weeks    Description  nasal congestion, facial pain/pressure, cough, ear pain bilateral, headache, fatigue/malaise and hoarse voice    Severity: moderate    Accompanying signs and symptoms: None    History (predisposing factors):  none    Precipitating or alleviating factors: None    Therapies tried and outcome:  rest and fluids , Advil OTC NSAID Mucinex-DM    Patient presents for evaluation of cough of 2 weeks duration. Patient states that her symptoms have been lingering on and off, initially she had some sinus congestion and pressure and then the sinus issues resolved; she was taking Advil Sinus couple weeks ago and then the cough started again; occasional yellow phlegm. She feels tired; she feels her cough is not getting better, she coughs day and night, she has been taking Mucinex D and some cough drops over-the-counter; denies any fever, vomiting, diarrhea or chills. She states she is too weak to cough, but not much of runny nose or headache now.  Denies any other systemic complaints.  No joint aches.  No dizziness, no palpitations.    Patient Active Problem List   Diagnosis     Morbid obesity (H)     Right wrist pain     Status post ORIF of fracture of ankle     Edema     Past Surgical History:   Procedure Laterality Date     ABDOMEN SURGERY      c section     ARTHROSCOPY ANKLE, OPEN REPAIR TENDON, COMBINED Left 4/17/2019    Procedure: 1.  Ankle arthroscopy with extensive synovectomy, left lower extremity.  2.  Peroneus brevis debridement, repair, left lower extremity.  3.  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  4.  Open reduction and internal fixation, left distal fibular fracture nonunion.  5.  Stressing of left ankle under anesthesia.   ;  Surgeon: Linus Chiu DPM;  Location: [facility] OR     HERNIA REPAIR       OPEN REDUCTION  INTERNAL FIXATION ANKLE Left 4/17/2019    Procedure: Open reduction internal fixation ankle;  Surgeon: Linus Chiu DPM;  Location: [facility] OR       Social History     Tobacco Use     Smoking status: Current Every Day Smoker     Packs/day: 1.00     Years: 25.00     Pack years: 25.00     Smokeless tobacco: Never Used   Substance Use Topics     Alcohol use: Yes     Comment: 2 drinks per month     Family History   Problem Relation Age of Onset     Family History Negative Mother      Family History Negative Father          Current Outpatient Medications   Medication Sig Dispense Refill     cetirizine (ZYRTEC) 10 MG tablet Take 1 tablet (10 mg) by mouth daily 30 tablet 0     fluticasone (FLONASE) 50 MCG/ACT nasal spray Spray 1 spray into both nostrils daily 18.2 mL 0     nicotine (COMMIT) 2 MG lozenge Place 1 lozenge (2 mg) inside cheek every hour as needed for smoking cessation 108 lozenge 11     nicotine (NICODERM CQ) 21 MG/24HR 24 hr patch Place 1 patch onto the skin every 24 hours 30 patch 11     order for DME Equipment being ordered: wheeled walker x 1. 1 Device 0     order for DME Equipment being ordered: steel-toe cap for walking boot 1 Device 0     order for DME Tall Aircast boot M 1 Device 0     pseudoePHEDrine (SUDAFED) 30 MG tablet Take 1 tablet (30 mg) by mouth every 6 hours as needed for congestion 16 tablet 0     vitamin D3 (CHOLECALCIFEROL) 44500 units capsule Take 1 tablet weekly x 12 weeks 12 capsule 0     No Known Allergies  BP Readings from Last 3 Encounters:   06/20/19 119/74   05/30/19 136/90   05/06/19 136/90    Wt Readings from Last 3 Encounters:   06/20/19 115.7 kg (255 lb)   05/30/19 115.7 kg (255 lb)   05/06/19 115.7 kg (255 lb)            Reviewed and updated as needed this visit by Provider         Review of Systems   ROS COMP: Constitutional, HEENT, cardiovascular, pulmonary, gi and gu systems are negative, except as otherwise noted.      Objective    /74 (BP Location: Right arm,  "Cuff Size: Adult Large)   Pulse 91   Temp 98.8  F (37.1  C) (Oral)   Ht 1.638 m (5' 4.5\")   Wt 115.7 kg (255 lb)   LMP 06/13/2019 (Exact Date)   SpO2 99%   BMI 43.09 kg/m    Body mass index is 43.09 kg/m .  Physical Exam   GENERAL: healthy, alert and no distress  EYES: Eyes grossly normal to inspection, PERRL and conjunctivae and sclerae normal  HENT: ear canals and TM's normal, mouth without ulcers or lesions; nostril mucosa erythematous and boggy.  Narrow posterior pharynx could not appreciate any drip, slightly erythematous and slightly enlarged submental lymph node glands.  Nontender to palpation. No overlying redness.  Not indurated.  NECK: no adenopathy, no asymmetry, masses, or scars and thyroid normal to palpation  RESP: lungs clear to auscultation - no rales, rhonchi or wheezes  CV: regular rate and rhythm, normal S1 S2, no S3 or S4, no murmur, click or rub, no peripheral edema and peripheral pulses strong  Abdomen soft    Diagnostic Test Results:  Labs reviewed in Epic        Assessment & Plan     Anders was seen today for cough.    Diagnoses and all orders for this visit:    Post-nasal drip  -     fluticasone (FLONASE) 50 MCG/ACT nasal spray; Spray 1 spray into both nostrils daily  -     cetirizine (ZYRTEC) 10 MG tablet; Take 1 tablet (10 mg) by mouth daily  -     pseudoePHEDrine (SUDAFED) 30 MG tablet; Take 1 tablet (30 mg) by mouth every 6 hours as needed for congestion    Cough  -     cetirizine (ZYRTEC) 10 MG tablet; Take 1 tablet (10 mg) by mouth daily    Nasal congestion  -     fluticasone (FLONASE) 50 MCG/ACT nasal spray; Spray 1 spray into both nostrils daily  -     cetirizine (ZYRTEC) 10 MG tablet; Take 1 tablet (10 mg) by mouth daily  -     pseudoePHEDrine (SUDAFED) 30 MG tablet; Take 1 tablet (30 mg) by mouth every 6 hours as needed for congestion      Patient was advised most likely the cough is from postnasal drip and nasal congestion.  Advised if any symptoms change, worsening of " cough, spiking fever, yellow phlegm production is worse or difficulty breathing or pleuritic chest pain, shortness of breath to come immediately for evaluation.  Advised side effects of Sudafed, may cause palpitations; Zyrtec may cause drowsiness and dry mouth.  Use Flonase daily.  Will assess again in a week; see how she does or earlier if symptoms worsen.  Patient reiterated understanding, she was discharged from clinic in stable conditions, not in any respiratory distress.    Tobacco Cessation:   reports that she has been smoking.  She has a 25.00 pack-year smoking history. She has never used smokeless tobacco.          See Patient Instructions    Return in about 1 week (around 6/27/2019), or cough, PND.    Michelle Parks MD  [facility]

## 2019-06-23 PROBLEM — R09.82 POST-NASAL DRIP: Status: ACTIVE | Noted: 2019-06-23

## 2019-06-23 PROBLEM — R05.9 COUGH: Status: ACTIVE | Noted: 2019-06-23

## 2019-06-23 PROBLEM — R09.81 NASAL CONGESTION: Status: ACTIVE | Noted: 2019-06-23

## 2019-06-28 ENCOUNTER — OFFICE VISIT (OUTPATIENT)
Dept: FAMILY MEDICINE | Facility: CLINIC | Age: 39
End: 2019-06-28
Payer: COMMERCIAL

## 2019-06-28 VITALS
TEMPERATURE: 98.9 F | BODY MASS INDEX: 43.32 KG/M2 | HEIGHT: 65 IN | RESPIRATION RATE: 16 BRPM | SYSTOLIC BLOOD PRESSURE: 110 MMHG | WEIGHT: 260 LBS | OXYGEN SATURATION: 96 % | DIASTOLIC BLOOD PRESSURE: 78 MMHG | HEART RATE: 87 BPM

## 2019-06-28 DIAGNOSIS — R05.9 COUGH: ICD-10-CM

## 2019-06-28 DIAGNOSIS — J34.89 SINUS PRESSURE: Primary | ICD-10-CM

## 2019-06-28 DIAGNOSIS — F31.81 BIPOLAR 2 DISORDER (H): ICD-10-CM

## 2019-06-28 DIAGNOSIS — T88.7XXA MEDICATION SIDE EFFECTS: ICD-10-CM

## 2019-06-28 PROCEDURE — 99214 OFFICE O/P EST MOD 30 MIN: CPT | Performed by: INTERNAL MEDICINE

## 2019-06-28 RX ORDER — PSEUDOEPHEDRINE HCL 30 MG
30 TABLET ORAL EVERY 6 HOURS PRN
Qty: 20 TABLET | Refills: 0 | Status: SHIPPED | OUTPATIENT
Start: 2019-06-28 | End: 2019-08-08

## 2019-06-28 RX ORDER — L.ACIDOPH/B.ANIMALIS/B.LONGUM 15B CELL
CAPSULE ORAL
Qty: 21 CAPSULE | Refills: 0 | Status: SHIPPED | OUTPATIENT
Start: 2019-06-28 | End: 2019-08-08

## 2019-06-28 ASSESSMENT — MIFFLIN-ST. JEOR: SCORE: 1842.29

## 2019-06-28 NOTE — PROGRESS NOTES
"Subjective     Anders Zepeda is a 39 year old female who presents to clinic today for the following health issues:    HPI   Patient is here to follow up on office visit 6/20/19 for a cough. Patient completed her treatments and she states that she is feeling better but all symptoms are still present. Patient is unable to get complete relief.     Patient is presenting for follow-up she feels that she has been doing better cough has improved she still has some yellow phlegm, minimal right-sided sinus pressure maxillary and frontal pressure.  Denies any fevers or chills, denies any vomiting or difficulty breathing.  She has been using her medications as prescribed including use of the [place] pot and Sudafed has helped, as well as Flonase daily and daily Zyrtec at bedtime.  She continues to smoke, she had tried Chantix in the past did not work for her, states gave her \"bad vivid dreams\" and also nicotine replacement did not work for her because of vivid dreams as well.  She has history of bipolar disorder type II and would like to establish with psychiatry    Patient Active Problem List   Diagnosis     Morbid obesity (H)     Right wrist pain     Status post ORIF of fracture of ankle     Edema     Cough     Post-nasal drip     Nasal congestion     Sinus pressure     Medication side effects     Bipolar 2 disorder (H)     Past Surgical History:   Procedure Laterality Date     ABDOMEN SURGERY      c section     ARTHROSCOPY ANKLE, OPEN REPAIR TENDON, COMBINED Left 4/17/2019    Procedure: 1.  Ankle arthroscopy with extensive synovectomy, left lower extremity.  2.  Peroneus brevis debridement, repair, left lower extremity.  3.  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  4.  Open reduction and internal fixation, left distal fibular fracture nonunion.  5.  Stressing of left ankle under anesthesia.   ;  Surgeon: Linus Chiu DPM;  Location: [facility] OR     HERNIA REPAIR       OPEN REDUCTION INTERNAL FIXATION ANKLE " Left 4/17/2019    Procedure: Open reduction internal fixation ankle;  Surgeon: Linus Chiu DPM;  Location: [facility] OR       Social History     Tobacco Use     Smoking status: Current Every Day Smoker     Packs/day: 1.00     Years: 25.00     Pack years: 25.00     Smokeless tobacco: Never Used   Substance Use Topics     Alcohol use: Yes     Comment: 2 drinks per month     Family History   Problem Relation Age of Onset     Family History Negative Mother      Family History Negative Father          Current Outpatient Medications   Medication Sig Dispense Refill     amoxicillin-clavulanate (AUGMENTIN) 875-125 MG tablet Take 1 tablet by mouth 2 times daily 20 tablet 0     cetirizine (ZYRTEC) 10 MG tablet Take 1 tablet (10 mg) by mouth daily 30 tablet 0     fluticasone (FLONASE) 50 MCG/ACT nasal spray Spray 1 spray into both nostrils daily 18.2 mL 0     nicotine (COMMIT) 2 MG lozenge Place 1 lozenge (2 mg) inside cheek every hour as needed for smoking cessation 108 lozenge 11     nicotine (NICODERM CQ) 21 MG/24HR 24 hr patch Place 1 patch onto the skin every 24 hours 30 patch 11     order for DME Equipment being ordered: wheeled walker x 1. 1 Device 0     order for DME Equipment being ordered: steel-toe cap for walking boot 1 Device 0     order for DME Tall Aircast boot M 1 Device 0     Probiotic Product (FLORAJEN3) CAPS 1 capsule once daily 21 capsule 0     pseudoePHEDrine (SUDAFED) 30 MG tablet Take 1 tablet (30 mg) by mouth every 6 hours as needed for congestion 20 tablet 0     pseudoePHEDrine (SUDAFED) 30 MG tablet Take 1 tablet (30 mg) by mouth every 6 hours as needed for congestion 16 tablet 0     vitamin D3 (CHOLECALCIFEROL) 11927 units capsule Take 1 tablet weekly x 12 weeks 12 capsule 0     No Known Allergies  BP Readings from Last 3 Encounters:   06/28/19 110/78   06/20/19 119/74   05/30/19 136/90    Wt Readings from Last 3 Encounters:   06/28/19 117.9 kg (260 lb)   06/20/19 115.7 kg (255 lb)   05/30/19 115.7  "kg (255 lb)                  Reviewed and updated as needed this visit by Provider         Review of Systems   ROS COMP: Constitutional, HEENT, cardiovascular, pulmonary, gi and gu systems are negative, except as otherwise noted.      Objective    /78 (BP Location: Right arm, Patient Position: Sitting, Cuff Size: Adult Large)   Pulse 87   Temp 98.9  F (37.2  C) (Oral)   Resp 16   Ht 1.638 m (5' 4.5\")   Wt 117.9 kg (260 lb)   LMP 06/13/2019 (Exact Date)   SpO2 96%   Breastfeeding? No   BMI 43.94 kg/m    Body mass index is 43.94 kg/m .  Physical Exam   General appearance not in acute distress complaining of stated age wears glasses , has right eye exotropia  HENT: has minimal direct sinus tenderness over the right maxillary sinus, nostril mucosa red and boggy, no discharge oral cavity: minimal cobblestoning, neck is supple, no cervical lymphadenopathy  Lungs clear to auscultation; bilateral good air entry, no prolonged expiratory phase on forceful breathing, Good chest rise bilateral  Heart regular rate and rhythm no murmur  Extremities no edema well-perfused        Assessment & Plan     Anders was seen today for recheck.    Diagnoses and all orders for this visit:    Sinus pressure  -     amoxicillin-clavulanate (AUGMENTIN) 875-125 MG tablet; Take 1 tablet by mouth 2 times daily  -     pseudoePHEDrine (SUDAFED) 30 MG tablet; Take 1 tablet (30 mg) by mouth every 6 hours as needed for congestion    Cough  -     amoxicillin-clavulanate (AUGMENTIN) 875-125 MG tablet; Take 1 tablet by mouth 2 times daily  -     pseudoePHEDrine (SUDAFED) 30 MG tablet; Take 1 tablet (30 mg) by mouth every 6 hours as needed for congestion    Medication side effects  -     Probiotic Product (FLORAJEN3) CAPS; 1 capsule once daily    Bipolar 2 disorder (H)  -     MENTAL HEALTH REFERRAL  - Adult; Psychiatry and Medication Management; [place] [facility] [place] [facility] [phone].  Medication management & " "future refills will be returned to [facility] PCP upon completion of evaluation; [place]...; Future    Continue with nasal decongestants including antihistamine , nasal Flonase and nasal saline irrigations with the [place] pot couple times a day.  Because of her persistent yellow phlegm production and sinus pressure offered to give her 10-day course of Augmentin.  Advised that she can wait the next 2 to 3 days if symptoms worsen start on antibiotics and she was in agreement with such plan, antibiotic cause her to have vaginal discharge and itching; advised her to take probiotics when on antibiotics.  As per her request referral was put to psychiatry  Refill given for Sudafed,, she denied any side effects from such no palpitations. She has been using the [place] pot 3-4 times a day and nasal Flonase and as well as antihistamine.  Advised her to help open the eustachian tube by closing nostrils and gently blow so that can build pressure and open her bilateral eustachian tubes, helps with Eustachian tube dysfunction.  Patient reiterated understanding all questions answered, patient discharged from clinic in stable condition and not in any respiratory distress.     Tobacco Cessation:   reports that she has been smoking.  She has a 25.00 pack-year smoking history. She has never used smokeless tobacco.  Tobacco Cessation Action Plan: Information offered: Patient not interested at this time      BMI:   Estimated body mass index is 43.94 kg/m  as calculated from the following:    Height as of this encounter: 1.638 m (5' 4.5\").    Weight as of this encounter: 117.9 kg (260 lb).   Weight management plan: Discussed healthy diet and exercise guidelines        See Patient Instructions    Return in about 1 week (around 7/5/2019) for COUGH, SINUS PRESSURE.    Michelle Parks MD  [facility]        "

## 2019-06-28 NOTE — NURSING NOTE
"Chief Complaint   Patient presents with     RECHECK     COUGH      /78 (BP Location: Right arm, Patient Position: Sitting, Cuff Size: Adult Large)   Pulse 87   Temp 98.9  F (37.2  C) (Oral)   Resp 16   Ht 1.638 m (5' 4.5\")   Wt 117.9 kg (260 lb)   LMP 06/13/2019 (Exact Date)   SpO2 96%   Breastfeeding? No   BMI 43.94 kg/m   Estimated body mass index is 43.94 kg/m  as calculated from the following:    Height as of this encounter: 1.638 m (5' 4.5\").    Weight as of this encounter: 117.9 kg (260 lb).  bp completed using cuff size: large       Health Maintenance addressed:  NONE    n/a    Luis Eduardo Brown MA \  "

## 2019-07-10 ENCOUNTER — THERAPY VISIT (OUTPATIENT)
Dept: PHYSICAL THERAPY | Facility: CLINIC | Age: 39
End: 2019-07-10
Payer: COMMERCIAL

## 2019-07-10 ENCOUNTER — THERAPY VISIT (OUTPATIENT)
Dept: OCCUPATIONAL THERAPY | Facility: CLINIC | Age: 39
End: 2019-07-10
Payer: COMMERCIAL

## 2019-07-10 DIAGNOSIS — R60.9 EDEMA: ICD-10-CM

## 2019-07-10 DIAGNOSIS — M25.531 RIGHT WRIST PAIN: ICD-10-CM

## 2019-07-10 DIAGNOSIS — Z98.890 STATUS POST ORIF OF FRACTURE OF ANKLE: ICD-10-CM

## 2019-07-10 DIAGNOSIS — Z87.81 STATUS POST ORIF OF FRACTURE OF ANKLE: ICD-10-CM

## 2019-07-10 PROCEDURE — 97110 THERAPEUTIC EXERCISES: CPT | Mod: GO | Performed by: OCCUPATIONAL THERAPIST

## 2019-07-10 PROCEDURE — 97140 MANUAL THERAPY 1/> REGIONS: CPT | Mod: GO | Performed by: OCCUPATIONAL THERAPIST

## 2019-07-10 PROCEDURE — 97110 THERAPEUTIC EXERCISES: CPT | Mod: GP | Performed by: PHYSICAL THERAPIST

## 2019-07-10 PROCEDURE — 97016 VASOPNEUMATIC DEVICE THERAPY: CPT | Mod: GP | Performed by: PHYSICAL THERAPIST

## 2019-07-10 NOTE — PROGRESS NOTES
[facility] [place]      System    Physical Exam    General     ROS    Assessment/Plan:    PROGRESS  REPORT    Progress reporting period is from 6/11/19 to 7/10/19.       SUBJECTIVE  Subjective: Pt's pain, ROM, and strength are better overall since last session, however, does experience an increase in pain with weight bearing activities. Exercises have senait going well overall. Performs them routinely     Current Pain level: 3/10.      Initial Pain level: 4/10.   Changes in function:  Yes (See Goal flowsheet attached for changes in current functional level)  Adverse reaction to treatment or activity: None    OBJECTIVE  Objective: L joint line CM 56.0cm, R 55.5cm; L ankle DF 16 degrees;      ASSESSMENT/PLAN  Updated problem list and treatment plan: Diagnosis 1:  S/p L ankle surgery  Pain -  splint/taping/bracing/orthotics, self management, education and home program  Decreased ROM/flexibility - manual therapy, therapeutic exercise, therapeutic activity and home program  Decreased strength - therapeutic exercise, therapeutic activities and home program  Impaired balance - neuro re-education, therapeutic activities and home program  Decreased proprioception - neuro re-education, therapeutic activities and home program  Edema - vasopneumatics  Decreased function - therapeutic activities and home program  STG/LTGs have been met or progress has been made towards goals:  Yes (See Goal flow sheet completed today.)  Assessment of Progress: The patient's condition is improving.  Self Management Plans:  Patient has been instructed in a home treatment program.  I have re-evaluated this patient and find that the nature, scope, duration and intensity of the therapy is appropriate for the medical condition of the patient.  Anders continues to require the following intervention to meet STG and LTG's:  PT    Recommendations:  This patient would benefit from continued therapy.     Frequency:  1 X week, once daily  Duration:  for 6  weeks        Please refer to the daily flowsheet for treatment today, total treatment time and time spent performing 1:1 timed codes.

## 2019-07-11 ENCOUNTER — MYC MEDICAL ADVICE (OUTPATIENT)
Dept: PODIATRY | Facility: CLINIC | Age: 39
End: 2019-07-11

## 2019-07-11 ENCOUNTER — OFFICE VISIT (OUTPATIENT)
Dept: PODIATRY | Facility: CLINIC | Age: 39
End: 2019-07-11
Payer: COMMERCIAL

## 2019-07-11 ENCOUNTER — ANCILLARY PROCEDURE (OUTPATIENT)
Dept: GENERAL RADIOLOGY | Facility: CLINIC | Age: 39
End: 2019-07-11
Attending: PODIATRIST
Payer: COMMERCIAL

## 2019-07-11 VITALS
DIASTOLIC BLOOD PRESSURE: 78 MMHG | BODY MASS INDEX: 43.32 KG/M2 | SYSTOLIC BLOOD PRESSURE: 110 MMHG | HEIGHT: 65 IN | WEIGHT: 260 LBS

## 2019-07-11 DIAGNOSIS — Z87.81 STATUS POST ORIF OF FRACTURE OF ANKLE: Primary | ICD-10-CM

## 2019-07-11 DIAGNOSIS — Z87.81 STATUS POST ORIF OF FRACTURE OF ANKLE: ICD-10-CM

## 2019-07-11 DIAGNOSIS — Z98.890 STATUS POST ORIF OF FRACTURE OF ANKLE: Primary | ICD-10-CM

## 2019-07-11 DIAGNOSIS — Z98.890 STATUS POST ORIF OF FRACTURE OF ANKLE: ICD-10-CM

## 2019-07-11 PROCEDURE — 99024 POSTOP FOLLOW-UP VISIT: CPT | Performed by: PODIATRIST

## 2019-07-11 PROCEDURE — 73610 X-RAY EXAM OF ANKLE: CPT | Mod: LT

## 2019-07-11 ASSESSMENT — MIFFLIN-ST. JEOR: SCORE: 1842.29

## 2019-07-11 NOTE — PROGRESS NOTES
"Foot & Ankle Surgery  July 11, 2019    S:  Patient in today approx 12 weeks sp left ankle scope, ORIF/grafting fibular fracture nonunion and P.brevis repair.  Pain levels improving.  She has the RollAbout, uses it for longer distances, but states \"it's going really good\".  She's increasing steps/time on feet.  She had to stop going to PT for 3 weeks 2/2 to illness, has done about 2 weeks thus far    Ht 1.638 m (5' 4.5\")   Wt 117.9 kg (260 lb)   LMP 06/13/2019 (Exact Date)   BMI 43.94 kg/m        ROS - positive for CC.  Patient denies current nausea, vomiting, chills, fevers, belly pain, calf pain, chest pain or SOB.  Complete remainder of ROS is otherwise neg.    PE - incision healed.  No fibular pain.  No pain with peroneal firing, but she states resisted eversion feels limited/weak.  Skin shows no trophic, color or temperature changes otherwise.  No calf redness, swelling or pain noted otherwise.    Imaging - IMPRESSION: ORIF distal fibula fracture which appears healed. Small  ridge on the medial aspect of the healed fibular fracture. Otherwise  unremarkable.    A/P - 39 year old yo patient approx 12 weeks sp above procedure  -personally reviewed imaging  -tensogrip for edema control  -continue WBAT; increase weight on foot, phase off crutches/RollAbout to tolerance  -RICE/tylenol prn based on pain  -continue/finish PT; continue HEP    Follow up  -  4 weeks or sooner with acute issues    Plan for [id number] - already working     Body mass index is 43.94 kg/m .  Weight management plan: Patient was referred to their PCP to discuss a diet and exercise plan.      Linus Chiu, DARYA FACFAS FACFAOM  Podiatric Foot & Ankle Surgeon  [facility]  [phone]      "

## 2019-07-11 NOTE — PATIENT INSTRUCTIONS
Thank you for choosing [facility]!    DR. LAWS'S CLINIC LOCATIONS:   MONDAY - [place] TUESDAY - [place]   [address] [facility]  [address] [facility] [address]   [place] [address] [place] [address]   [phone]       THURSDAY AM - [place] THURSDAY PM - [place]   [address]   [place] [address] [place], [place] [id number] [phone]       FRIDAY AM - [place] SET UP SURGERY: [phone] [address] APPOINTMENTS: [phone]   [place] [address] BILLING QUESTIONS: [phone] FAX NUMBER: [phone]     Dr. Laws will be out of the office on paternity leave from June 25 - July 10.    Follow Up:  4 weeks        BODY WEIGHT AND YOUR FEET  The following information is included in the after visit summary for all patients. Body weight can be a sensitive issue to discuss in clinic, but we think the following information is very important. Although we focus on the feet and ankles, we do support the overall health of our patients.     Many things can cause foot and ankle problems. Foot structure, activity level, foot mechanics and injuries are common causes of pain. One very important issue that often goes unmentioned, is body weight. Extra weight can cause increased stress on muscles, ligaments, bones and tendons. Sometimes just a few extra pounds is all it takes to put one over her/his threshold. Without reducing that stress, it can be difficult to alleviate pain. As Foot & Ankle specialists, our job is addressing the lower extremity problem and possible causes. Regarding extra body weight, we encourage patients to discuss diet and weight management plans with their primary care doctors. It is this team approach that gives you the best opportunity for pain relief and getting you back on your feet.      [facility] has a Comprehensive Weight Management Program. This program includes counseling,  education, non-surgical and surgical approaches to weight loss. If you are interested in learning more either talk to you primary care provider or call [phone].

## 2019-07-17 ENCOUNTER — THERAPY VISIT (OUTPATIENT)
Dept: PHYSICAL THERAPY | Facility: CLINIC | Age: 39
End: 2019-07-17
Payer: COMMERCIAL

## 2019-07-17 ENCOUNTER — THERAPY VISIT (OUTPATIENT)
Dept: OCCUPATIONAL THERAPY | Facility: CLINIC | Age: 39
End: 2019-07-17
Payer: COMMERCIAL

## 2019-07-17 DIAGNOSIS — Z98.890 STATUS POST ORIF OF FRACTURE OF ANKLE: ICD-10-CM

## 2019-07-17 DIAGNOSIS — R60.9 EDEMA: ICD-10-CM

## 2019-07-17 DIAGNOSIS — M25.531 RIGHT WRIST PAIN: ICD-10-CM

## 2019-07-17 DIAGNOSIS — Z87.81 STATUS POST ORIF OF FRACTURE OF ANKLE: ICD-10-CM

## 2019-07-17 PROCEDURE — 97016 VASOPNEUMATIC DEVICE THERAPY: CPT | Mod: GP | Performed by: PHYSICAL THERAPY ASSISTANT

## 2019-07-17 PROCEDURE — 97110 THERAPEUTIC EXERCISES: CPT | Mod: GP | Performed by: PHYSICAL THERAPY ASSISTANT

## 2019-07-17 PROCEDURE — 97140 MANUAL THERAPY 1/> REGIONS: CPT | Mod: GO | Performed by: OCCUPATIONAL THERAPIST

## 2019-07-17 PROCEDURE — 97110 THERAPEUTIC EXERCISES: CPT | Mod: GO | Performed by: OCCUPATIONAL THERAPIST

## 2019-07-17 NOTE — PROGRESS NOTES
Hand Therapy Progress Note  Current Date:   7/17/2019  Reporting Period: 5/15/2019 to 7/17/2019      Subjective:  Anders Zepeda is a 39 year old right hand dominant female.    Diagnosis: R wrist muscle strain  DOI:  1 month ago (MD order date 5/6/19)    S:  Subjective changes as noted by patient: the wrist is not as painful as it was.  I've gotten use to using my left for heavy tasks.  I am still having soreness up here (forearm).  I have been using the gua sha tool and that helps.       Functional changes noted by patient:   Haven't tested strength, fine with other daily tasks  Response to previous treatment:  good  Patient has noted adverse reaction to:   None      Objective:  Pain Level (Scale 0-10):   5/15/2019 7/17/19   At Rest 0 0   With Use 7 2     Pain Description:  Date 5/15/2019 7/17/19   Location R ulnar wrist and distal forearm, will radiate to elbow if in supination with a weight ECU MM belly and at times shoots up to elbow   Pain Quality Burning and Sharp Burning and shooting   Frequency intermittent   intermittent   Pain is worst  daytime or nighttime    Exacerbated by  activity--supination with weight in hand, WB Dependent on activity   Relieved by rest and brace  rest and brace   Progression unchanged  gradually improving     Tenderness: Pain level report on scale 0-10/10  Date 5/15/2019  7/17/2019   Side R  R   Ulna Styloid 0  NT    TFCC 0 NT   Distal Radius 0 NT   Radial Styloid 0 NT   DRUJ 2 2   Volar Scaphoid 0 NT   Dorsal Scaphoid 0 NT   Volar Lunate 0 NT   Dorsal Lunate 0 NT   Dorsal Triquetrum 4-5 4   ECU tendon insertion 3 0   ECU muscle belly 8 5-6     Edema:  Circumference (measured in cm)  Wrist/Elbow  Date 5/15/2019 5/15/2019   Side R L   DWC  16.4 16.6     Range of Motion Wrist AROM (PROM): WNL per visual observation.  Pain present with end ranges of motion    Special Tests: pain scale of 0-10/10, MMT scale of 0-5/5  Date 5/15/2019  7/17/2019   Side R  R   ULTT ulnar nerve bias ~40%  ~7/17/2019 50%   PROM of Wrist Flexion and Radial Deviation 7/10 1/10 at end range of movement   MMT of ECU Muscle 0/10, 5/5 NT   ECU Synergy Test +, 7/10 Sore, but no pain     STRENGTH: (Measured in pounds, pain scale 0-10/10)    Date 5/15/2019  7/17/2019       Trials Left Right Left Right Left Right Left Right Left Right Left Right   1 64 47 70 69           2               3               Avg               Pain                 3 Point Pinch  Date 5/15/2019  7/17/2019       Trials Left Right Left Right Left Right Left Right Left Right Left Right   1 14 11 14 15           2               3               Avg               Pain                 Lateral Pinch  Date 5/15/2019  7/17/2019       Trials Left Right Left Right Left Right Left Right Left Right Left Right   1 14 12 16 17           2               3               Avg               Pain                 Assessment:  Response to therapy has been improvement to:  Flexibility:  improved excursion of involved muscles and less tightness in involved muscles  Strength:   and pinch  Pain:  frequency is less, intensity of pain is decreased, duration of pain is decreased and less tender over affected area    Overall Assessment:  Patient's symptoms are resolving.  Patient is ready to progress to more complex exercises.  Patient is ready to progress to next level of protocol.  Patient is becoming more independent in home exercise program  Patient would benefit from continued therapy to achieve rehab potential  STG/LTG:  STGoals have been reviewed;  see goal sheet for details and updates.  LTGoals have been reviewed;  see goal sheet for details and updates.    I have re-evaluated this patient and find that the nature, scope, duration and intensity of the therapy is appropriate for the medical condition of the patient.    P: Frequency:  1 X week, once daily  Duration:  for 6 weeks    Treatment Plan:    Modalities:  US   Therapeutic Exercise: AROM of wrist, PROM with  stretch to wrist extensors and flexors,  Wrist stability program, isometrics  Neuromuscular Techniques: Closed chain exercises, proprioception  Manual Techniques:, Myofascial release of the forearm extensors and flexors, wrist mobilization techniques  Orthosis:  Static orthosis and Forearm based orthosis    Home Program:   Exercise: AROM of wrist,  PROM with stretch to wrist extensors and flexors.  Wrist stability program--begin with door frame pull backs and wall push-ups  Orthosis: [place] Thumboform   Activity: Avoid activities that exacerbate symptoms in the median nerve.  Avoid prolonged flexion or extension of the wrist.    Discharge Plan:    Achieve all LTG.  Independent in home treatment program.  Reach maximal therapeutic benefit.    Next Visit:  MFR  Passive ECU stretch

## 2019-08-02 ENCOUNTER — TELEPHONE (OUTPATIENT)
Dept: PODIATRY | Facility: CLINIC | Age: 39
End: 2019-08-02

## 2019-08-02 ENCOUNTER — THERAPY VISIT (OUTPATIENT)
Dept: OCCUPATIONAL THERAPY | Facility: CLINIC | Age: 39
End: 2019-08-02
Payer: COMMERCIAL

## 2019-08-02 DIAGNOSIS — M25.531 RIGHT WRIST PAIN: ICD-10-CM

## 2019-08-02 PROCEDURE — 97112 NEUROMUSCULAR REEDUCATION: CPT | Mod: GO | Performed by: OCCUPATIONAL THERAPIST

## 2019-08-02 PROCEDURE — 97140 MANUAL THERAPY 1/> REGIONS: CPT | Mod: GO | Performed by: OCCUPATIONAL THERAPIST

## 2019-08-02 NOTE — TELEPHONE ENCOUNTER
Dulce from orthotics calling stating that the orthotics order from April has been denied due to lack of medical necessity.     She is requesting a letter of medical necessity from Dr. Chiu.     Upon chart review I do not see an order to orthotic or anything in Dr. Chiu's notes regarding bracing or orthotics.     Will return call to Dulce to gather more information.   ____________________________________________    After speaking with Dulce    Order was for a Static AFO on 4/17/19.     Would like a letter of necessity written for a re-consideration.     Please notify Dulce at [phone] when the letter is completed.     Fela Guzman M.Ed., LAT, ATC

## 2019-08-02 NOTE — LETTER
[facility]  [address]  [facility] [address]  [phone]          August 6, 2019    RE:  Anders Zepeda                                                                                                                                                       [address]  [facility] [address]            To whom it may concern:    Anders Zepeda is under my professional care after undergoing reconstructive left ankle surgery 4/17/19.  She was placed in a pre-fabricated metal splint post-operatively through the [facility], to stabilize the ankle and accommodative the post-operative compression dressing.  I considered it medically necessary to utilize the splint after her surgery.    Sincerely,        Linus Chiu DPM FACFAS FACFAOM  Podiatric Foot & Ankle Surgeon  [facility]

## 2019-08-06 NOTE — TELEPHONE ENCOUNTER
Letter printed, will call Eduar Chiu DPM FACFAS FACFAOM  Podiatric Foot & Ankle Surgeon  [facility]  [phone]

## 2019-08-08 ENCOUNTER — ANCILLARY PROCEDURE (OUTPATIENT)
Dept: GENERAL RADIOLOGY | Facility: CLINIC | Age: 39
End: 2019-08-08
Attending: PODIATRIST
Payer: COMMERCIAL

## 2019-08-08 ENCOUNTER — OFFICE VISIT (OUTPATIENT)
Dept: PODIATRY | Facility: CLINIC | Age: 39
End: 2019-08-08
Payer: COMMERCIAL

## 2019-08-08 ENCOUNTER — OFFICE VISIT (OUTPATIENT)
Dept: PSYCHIATRY | Facility: CLINIC | Age: 39
End: 2019-08-08
Attending: INTERNAL MEDICINE
Payer: COMMERCIAL

## 2019-08-08 VITALS
OXYGEN SATURATION: 98 % | HEART RATE: 73 BPM | TEMPERATURE: 98.3 F | SYSTOLIC BLOOD PRESSURE: 128 MMHG | DIASTOLIC BLOOD PRESSURE: 78 MMHG | BODY MASS INDEX: 44.15 KG/M2 | HEIGHT: 65 IN | RESPIRATION RATE: 16 BRPM | WEIGHT: 265 LBS

## 2019-08-08 VITALS — HEIGHT: 65 IN | BODY MASS INDEX: 43.32 KG/M2 | WEIGHT: 260 LBS

## 2019-08-08 DIAGNOSIS — F51.01 PRIMARY INSOMNIA: Primary | ICD-10-CM

## 2019-08-08 DIAGNOSIS — Z87.81 STATUS POST ORIF OF FRACTURE OF ANKLE: Primary | ICD-10-CM

## 2019-08-08 DIAGNOSIS — E55.9 VITAMIN D DEFICIENCY: ICD-10-CM

## 2019-08-08 DIAGNOSIS — Z98.890 STATUS POST ORIF OF FRACTURE OF ANKLE: Primary | ICD-10-CM

## 2019-08-08 DIAGNOSIS — F31.81 BIPOLAR 2 DISORDER (H): ICD-10-CM

## 2019-08-08 DIAGNOSIS — Z79.899 HIGH RISK MEDICATION USE: ICD-10-CM

## 2019-08-08 DIAGNOSIS — Z98.890 STATUS POST ORIF OF FRACTURE OF ANKLE: ICD-10-CM

## 2019-08-08 DIAGNOSIS — Z87.81 STATUS POST ORIF OF FRACTURE OF ANKLE: ICD-10-CM

## 2019-08-08 PROCEDURE — 90792 PSYCH DIAG EVAL W/MED SRVCS: CPT | Performed by: NURSE PRACTITIONER

## 2019-08-08 PROCEDURE — 73610 X-RAY EXAM OF ANKLE: CPT | Mod: LT

## 2019-08-08 PROCEDURE — 99213 OFFICE O/P EST LOW 20 MIN: CPT | Performed by: PODIATRIST

## 2019-08-08 RX ORDER — OLANZAPINE 5 MG/1
5 TABLET ORAL AT BEDTIME
Qty: 30 TABLET | Refills: 1 | Status: SHIPPED | OUTPATIENT
Start: 2019-08-08 | End: 2020-01-15

## 2019-08-08 ASSESSMENT — MIFFLIN-ST. JEOR
SCORE: 1864.97
SCORE: 1842.29

## 2019-08-08 ASSESSMENT — ANXIETY QUESTIONNAIRES
GAD7 TOTAL SCORE: 18
2. NOT BEING ABLE TO STOP OR CONTROL WORRYING: NEARLY EVERY DAY
3. WORRYING TOO MUCH ABOUT DIFFERENT THINGS: NEARLY EVERY DAY
GAD7 TOTAL SCORE: 18
1. FEELING NERVOUS, ANXIOUS, OR ON EDGE: NEARLY EVERY DAY
4. TROUBLE RELAXING: NEARLY EVERY DAY
5. BEING SO RESTLESS THAT IT IS HARD TO SIT STILL: SEVERAL DAYS
7. FEELING AFRAID AS IF SOMETHING AWFUL MIGHT HAPPEN: MORE THAN HALF THE DAYS
6. BECOMING EASILY ANNOYED OR IRRITABLE: NEARLY EVERY DAY
7. FEELING AFRAID AS IF SOMETHING AWFUL MIGHT HAPPEN: MORE THAN HALF THE DAYS
GAD7 TOTAL SCORE: 18

## 2019-08-08 ASSESSMENT — PAIN SCALES - GENERAL: PAINLEVEL: NO PAIN (0)

## 2019-08-08 ASSESSMENT — PATIENT HEALTH QUESTIONNAIRE - PHQ9
SUM OF ALL RESPONSES TO PHQ QUESTIONS 1-9: 6
SUM OF ALL RESPONSES TO PHQ QUESTIONS 1-9: 6
10. IF YOU CHECKED OFF ANY PROBLEMS, HOW DIFFICULT HAVE THESE PROBLEMS MADE IT FOR YOU TO DO YOUR WORK, TAKE CARE OF THINGS AT HOME, OR GET ALONG WITH OTHER PEOPLE: VERY DIFFICULT

## 2019-08-08 NOTE — PATIENT INSTRUCTIONS
Thank you for choosing [facility]!    DR. LAWS'S CLINIC LOCATIONS:   MONDAY - [place] TUESDAY - [place]   [address] [facility]  [address]   [place] [address] [place] [address]   [phone]       THURSDAY AM - [place] THURSDAY PM - [place]   [address]   [place] [address] [place] [address]   [phone]       FRIDAY AM - [place] SET UP SURGERY: [phone] [address] APPOINTMENTS: [phone]   [place] [address] BILLING QUESTIONS: [phone] FAX NUMBER: [phone]     Follow Up:  2 months      FYI: The following information is included in the after visit summary for all patients:  Body weight can be a sensitive issue to discuss in clinic, but we think the following information is very important. Although we focus on the feet and ankles, we do support the overall health of our patients. Many things can cause foot and ankle problems. Foot structure, activity level, foot mechanics and injuries are common causes of pain. One very important issue that often goes unmentioned, is body weight. Extra weight can cause increased stress on muscles, ligaments, bones and tendons. Sometimes just a few extra pounds is all it takes to put one over her/his threshold. Without reducing that stress, it can be difficult to alleviate pain. As Foot & Ankle specialists, our job is addressing the lower extremity problem and possible causes. Regarding extra body weight, we encourage patients to discuss diet and weight management plans with their primary care doctors. It is this team approach that gives you the best opportunity for pain relief and getting you back on your feet. [facility] has a Comprehensive Weight Management Program. This program includes counseling, education, non-surgical and surgical approaches to weight loss. If you are interested in learning more either talk to you  primary care provider or call [phone].

## 2019-08-08 NOTE — PROGRESS NOTES
"Foot & Ankle Surgery  August 8, 2019    S:  Patient in today approx 16 weeks sp left ankle scope, ORIF distal fibular fracture nonunion, an dP.brevis repair.  Pain levels low.  Can be sore after long day of being on feet.  Has ambulated at home outside the boot.  Continues to do PT, doesn't recall how many sessions she has left.  Things are \"good\".  States the incision can itch quite a bit    Ht 1.638 m (5' 4.5\")   Wt 117.9 kg (260 lb)   BMI 43.94 kg/m        ROS - positive for CC.  Patient denies current nausea, vomiting, chills, fevers, belly pain, calf pain, chest pain or SOB.  Complete remainder of ROS is otherwise neg.    PE - incision healed.  A few small visible sub-q sutures noted, but no drainage, no dermatitis, no pathological findings along the incision.  Minimal tenderness at fibula and peroneal tedons.  Skin shows no trophic, color or temperature changes otherwise.  No calf redness, swelling or pain noted otherwise.    Imaging - 3 views WB - intact hardware distal fibula.  Fracture line appears to be fully healed.  Ankle mortise is anatomic.      A/P - 39 year old yo patient approx 16 weeks sp above procedure  -personally reviewed imaging  -transition from boot to trilok/shoe as tolerated  -RICE/NSAID vs tylenol prn based on pain  -finish PT; continue HEP.      Follow up  -  6 mo from DOS or sooner with acute issues    Plan for [id number] - already working     Body mass index is 43.94 kg/m .  Weight management plan: Patient was referred to their PCP to discuss a diet and exercise plan.      Linus Chiu DPM FACFAS FACFAOM  Podiatric Foot & Ankle Surgeon  [facility]  [phone]    "
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
## 2019-08-08 NOTE — PATIENT INSTRUCTIONS
Treatment Plan:    Start Zyprexa (olanzapine) 5-10 mg by mouth daily at bedtime    Continue all other medications as reviewed per electronic medical record today.     Safety plan reviewed. To the Emergency Department as needed or call after hours crisis line at [phone] or [phone]. [place] Crisis Text Line: Text [place] to 927519  or  Suicide LifeLine Chat: suicidepreventionMeetDoctorline.org/chat    To schedule individual or family therapy, call [facility] at [phone].     Schedule an appointment with me as needed.     Follow up with primary care provider as planned or sooner for acute medical concerns.    Call the psychiatric nurse line with medication questions or concerns at [phone].    ProspectNowt may be used to communicate with your provider, but this is not intended to be used for emergencies.

## 2019-08-08 NOTE — NURSING NOTE
"Chief Complaint   Patient presents with     Consult     referred by Dr Parks- diagnosed with bipolar disorder early 20's, pt having some hypermanic episodes recently, pt wanting to discuss mood stabilizer previous psych .      initial /78 (BP Location: Left arm, Patient Position: Chair, Cuff Size: Adult Large)   Pulse 73   Temp 98.3  F (36.8  C) (Oral)   Resp 16   Ht 1.638 m (5' 4.5\")   Wt 120.2 kg (265 lb)   LMP 2019 (Exact Date)   SpO2 98%   BMI 44.78 kg/m   Estimated body mass index is 44.78 kg/m  as calculated from the following:    Height as of this encounter: 1.638 m (5' 4.5\").    Weight as of this encounter: 120.2 kg (265 lb)..  bp completed using cuff size large    "

## 2019-08-08 NOTE — Clinical Note
Hi Dr Omalley,Thank you for the Psychiatry referral to the [facility] ([facility]). Our psychiatry providers act as a specialty service for Primary Care Providers in the [facility] that seek to optimize medications for unstable patients.  Once medications have been optimized, our providers discharge the patient back to the referring Primary Care Provider for ongoing medication management.  This type of system allows our providers to serve a high volume of patients. Please see my Impression and Plan.  I think she will do fine on Zyprexa (olanzapine) as needed for mood and insomnia. I am doing full panel of labs and can send you the results. No specific follow up with me planned. If she is doing well, I will send care back to you. If you have any questions or concerns, please let me know. Haritha

## 2019-08-09 ENCOUNTER — THERAPY VISIT (OUTPATIENT)
Dept: OCCUPATIONAL THERAPY | Facility: CLINIC | Age: 39
End: 2019-08-09
Payer: COMMERCIAL

## 2019-08-09 DIAGNOSIS — E55.9 VITAMIN D DEFICIENCY: ICD-10-CM

## 2019-08-09 DIAGNOSIS — F31.81 BIPOLAR 2 DISORDER (H): ICD-10-CM

## 2019-08-09 DIAGNOSIS — M25.531 RIGHT WRIST PAIN: ICD-10-CM

## 2019-08-09 DIAGNOSIS — Z79.899 HIGH RISK MEDICATION USE: ICD-10-CM

## 2019-08-09 LAB
ALBUMIN SERPL-MCNC: 3.2 G/DL (ref 3.4–5)
ALP SERPL-CCNC: 84 U/L (ref 40–150)
ALT SERPL W P-5'-P-CCNC: 24 U/L (ref 0–50)
ANION GAP SERPL CALCULATED.3IONS-SCNC: 2 MMOL/L (ref 3–14)
AST SERPL W P-5'-P-CCNC: 18 U/L (ref 0–45)
BILIRUB SERPL-MCNC: 0.2 MG/DL (ref 0.2–1.3)
BUN SERPL-MCNC: 13 MG/DL (ref 7–30)
CALCIUM SERPL-MCNC: 8.5 MG/DL (ref 8.5–10.1)
CHLORIDE SERPL-SCNC: 107 MMOL/L (ref 94–109)
CHOLEST SERPL-MCNC: 145 MG/DL
CO2 SERPL-SCNC: 29 MMOL/L (ref 20–32)
CREAT SERPL-MCNC: 0.68 MG/DL (ref 0.52–1.04)
ERYTHROCYTE [DISTWIDTH] IN BLOOD BY AUTOMATED COUNT: 17.5 % (ref 10–15)
GFR SERPL CREATININE-BSD FRML MDRD: >90 ML/MIN/{1.73_M2}
GLUCOSE SERPL-MCNC: 98 MG/DL (ref 70–99)
HCT VFR BLD AUTO: 32.3 % (ref 35–47)
HDLC SERPL-MCNC: 49 MG/DL
HGB BLD-MCNC: 9.8 G/DL (ref 11.7–15.7)
LDLC SERPL CALC-MCNC: 79 MG/DL
MCH RBC QN AUTO: 25.1 PG (ref 26.5–33)
MCHC RBC AUTO-ENTMCNC: 30.3 G/DL (ref 31.5–36.5)
MCV RBC AUTO: 83 FL (ref 78–100)
NONHDLC SERPL-MCNC: 96 MG/DL
PLATELET # BLD AUTO: 312 10E9/L (ref 150–450)
POTASSIUM SERPL-SCNC: 4.2 MMOL/L (ref 3.4–5.3)
PROT SERPL-MCNC: 6.5 G/DL (ref 6.8–8.8)
RBC # BLD AUTO: 3.9 10E12/L (ref 3.8–5.2)
SODIUM SERPL-SCNC: 138 MMOL/L (ref 133–144)
TRIGL SERPL-MCNC: 83 MG/DL
TSH SERPL DL<=0.005 MIU/L-ACNC: 1.48 MU/L (ref 0.4–4)
WBC # BLD AUTO: 4.8 10E9/L (ref 4–11)

## 2019-08-09 PROCEDURE — 97112 NEUROMUSCULAR REEDUCATION: CPT | Mod: GO | Performed by: OCCUPATIONAL THERAPIST

## 2019-08-09 PROCEDURE — 97140 MANUAL THERAPY 1/> REGIONS: CPT | Mod: GO | Performed by: OCCUPATIONAL THERAPIST

## 2019-08-09 PROCEDURE — 85027 COMPLETE CBC AUTOMATED: CPT | Performed by: NURSE PRACTITIONER

## 2019-08-09 PROCEDURE — 82306 VITAMIN D 25 HYDROXY: CPT | Performed by: NURSE PRACTITIONER

## 2019-08-09 PROCEDURE — 80053 COMPREHEN METABOLIC PANEL: CPT | Performed by: NURSE PRACTITIONER

## 2019-08-09 PROCEDURE — 36415 COLL VENOUS BLD VENIPUNCTURE: CPT | Performed by: NURSE PRACTITIONER

## 2019-08-09 PROCEDURE — 84443 ASSAY THYROID STIM HORMONE: CPT | Performed by: NURSE PRACTITIONER

## 2019-08-09 PROCEDURE — 80061 LIPID PANEL: CPT | Performed by: NURSE PRACTITIONER

## 2019-08-09 ASSESSMENT — PATIENT HEALTH QUESTIONNAIRE - PHQ9: SUM OF ALL RESPONSES TO PHQ QUESTIONS 1-9: 6

## 2019-08-09 ASSESSMENT — ANXIETY QUESTIONNAIRES: GAD7 TOTAL SCORE: 18

## 2019-08-13 LAB
DEPRECATED CALCIDIOL+CALCIFEROL SERPL-MC: <41 UG/L (ref 20–75)
VITAMIN D2 SERPL-MCNC: <5 UG/L
VITAMIN D3 SERPL-MCNC: 36 UG/L

## 2019-08-13 NOTE — RESULT ENCOUNTER NOTE
Still waiting on the results for the vitamin D.   Her other labs are a little abnormal.   She has a pretty low hemoglobin and I recommend that she follow with her Primary Care Provider.   I am sending this to Primary Care Provider as an FYI.

## 2019-08-15 ENCOUNTER — TELEPHONE (OUTPATIENT)
Dept: PSYCHIATRY | Facility: CLINIC | Age: 39
End: 2019-08-15

## 2019-08-15 NOTE — TELEPHONE ENCOUNTER
Reason for call:  Review results, returning Chaya's call    Phone number to reach patient:  [phone]    Best Time:  In car driving till 330pm -please call her after that    Can we leave a detailed message on this number? YES    .

## 2019-08-15 NOTE — RESULT ENCOUNTER NOTE
Please contact patient.   Combination result of vitamin D was okay.   She just stopped the vitamin D3 50,000 units treatment 4 weeks ago.   The D3 level (long term level of vitamin D in blood) was good, but the Vitamin D2 (short term level of vitamin D in blood) level was low meaning we need to have her start vitamin D3 2000 units per day.  Follow with Primary Care Provider as planned.

## 2019-08-16 ENCOUNTER — THERAPY VISIT (OUTPATIENT)
Dept: OCCUPATIONAL THERAPY | Facility: CLINIC | Age: 39
End: 2019-08-16
Payer: COMMERCIAL

## 2019-08-16 ENCOUNTER — THERAPY VISIT (OUTPATIENT)
Dept: PHYSICAL THERAPY | Facility: CLINIC | Age: 39
End: 2019-08-16
Payer: COMMERCIAL

## 2019-08-16 DIAGNOSIS — Z98.890 STATUS POST ORIF OF FRACTURE OF ANKLE: ICD-10-CM

## 2019-08-16 DIAGNOSIS — Z87.81 STATUS POST ORIF OF FRACTURE OF ANKLE: ICD-10-CM

## 2019-08-16 DIAGNOSIS — R60.9 EDEMA: ICD-10-CM

## 2019-08-16 DIAGNOSIS — M25.531 RIGHT WRIST PAIN: ICD-10-CM

## 2019-08-16 PROCEDURE — 97016 VASOPNEUMATIC DEVICE THERAPY: CPT | Mod: GP | Performed by: PHYSICAL THERAPIST

## 2019-08-16 PROCEDURE — 97112 NEUROMUSCULAR REEDUCATION: CPT | Mod: GP | Performed by: PHYSICAL THERAPIST

## 2019-08-16 PROCEDURE — 97112 NEUROMUSCULAR REEDUCATION: CPT | Mod: GO | Performed by: OCCUPATIONAL THERAPIST

## 2019-08-16 PROCEDURE — 97110 THERAPEUTIC EXERCISES: CPT | Mod: GP | Performed by: PHYSICAL THERAPIST

## 2019-08-16 PROCEDURE — 97140 MANUAL THERAPY 1/> REGIONS: CPT | Mod: GO | Performed by: OCCUPATIONAL THERAPIST

## 2019-08-16 NOTE — PROGRESS NOTES
SOAP Note objective information for 8/16/2019    STRENGTH: (Measured in pounds, pain scale 0-10/10)    Date 5/15/2019  7/17/2019 8/16/19      Trials Left Right Left Right Left Right Left Right Left Right Left Right   1 64 47 70 69 58 62         2     68 72         3               Avg               Pain                 3 Point Pinch  Date 5/15/2019  7/17/2019 8/16/19      Trials Left Right Left Right Left Right Left Right Left Right Left Right   1 14 11 14 15 13 15         2               3               Avg               Pain                 Lateral Pinch  Date 5/15/2019  7/17/2019 8/16/19      Trials Left Right Left Right Left Right Left Right Left Right Left Right   1 14 12 16 17 15 19         2               3               Avg               Pain               Please refer to the daily flowsheet for treatment today, total treatment time and time spent performing 1:1 timed codes.

## 2019-08-22 ENCOUNTER — OFFICE VISIT (OUTPATIENT)
Dept: FAMILY MEDICINE | Facility: CLINIC | Age: 39
End: 2019-08-22
Payer: COMMERCIAL

## 2019-08-22 VITALS
OXYGEN SATURATION: 97 % | TEMPERATURE: 98.5 F | WEIGHT: 264.8 LBS | BODY MASS INDEX: 44.12 KG/M2 | DIASTOLIC BLOOD PRESSURE: 81 MMHG | HEART RATE: 62 BPM | SYSTOLIC BLOOD PRESSURE: 122 MMHG | HEIGHT: 65 IN

## 2019-08-22 DIAGNOSIS — Z78.9 VEGETARIAN DIET: ICD-10-CM

## 2019-08-22 DIAGNOSIS — E55.9 VITAMIN D DEFICIENCY: ICD-10-CM

## 2019-08-22 DIAGNOSIS — E44.1 MILD PROTEIN-CALORIE MALNUTRITION (H): ICD-10-CM

## 2019-08-22 DIAGNOSIS — F31.81 BIPOLAR 2 DISORDER (H): ICD-10-CM

## 2019-08-22 DIAGNOSIS — R53.82 CHRONIC FATIGUE: ICD-10-CM

## 2019-08-22 DIAGNOSIS — D50.8 IRON DEFICIENCY ANEMIA SECONDARY TO INADEQUATE DIETARY IRON INTAKE: Primary | ICD-10-CM

## 2019-08-22 PROCEDURE — 99214 OFFICE O/P EST MOD 30 MIN: CPT | Performed by: INTERNAL MEDICINE

## 2019-08-22 ASSESSMENT — MIFFLIN-ST. JEOR: SCORE: 1864.06

## 2019-08-22 NOTE — PROGRESS NOTES
Chief Complaint:       Anders Zepeda is a 39 year old female who presents to clinic today for the following health issues:    Multiple concerns including recent abnormal labs concerning for iron deficiency anemia    HPI:   Patient Anders Zepeda is a very pleasant 39 year old female with history of bipolar 2 disorder who presents to Internal Medicine clinic today for follow up of multiple concerns including recent abnormal labs concerning for iron deficiency anemia. Regarding the patient's chronic iron deficiency anemia, the patient reports that she was not able to tolerate oral iron supplementation medications in the past. She is interested in a hematology evaluation by the [facility] hematology clinic in [place] going forward. She is a vegetarian and also does not eat much iron rich foods including beef. Regarding the patient's chronic bipolar 2 disorder, the patient denies any suicidal ideations or acute hallucinations. She is compliant with her Zyprexa psych medication and is followed by a local outpatient psychiatry clinic. No chest pain, headaches, fever or chills.           Current Medications:     Current Outpatient Medications   Medication Sig Dispense Refill     OLANZapine (ZYPREXA) 5 MG tablet Take 1 tablet (5 mg) by mouth At Bedtime 30 tablet 1         Allergies:      Allergies   Allergen Reactions     Erythromycin      Other reaction(s): Vomiting            Past Medical History:     Past Medical History:   Diagnosis Date     Ankle sprain          Past Surgical History:     Past Surgical History:   Procedure Laterality Date     ABDOMEN SURGERY      c section     ARTHROSCOPY ANKLE, OPEN REPAIR TENDON, COMBINED Left 4/17/2019    Procedure: 1.  Ankle arthroscopy with extensive synovectomy, left lower extremity.  2.  Peroneus brevis debridement, repair, left lower extremity.  3.  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  4.  Open reduction and internal fixation, left distal fibular fracture nonunion.   5.  Stressing of left ankle under anesthesia.   ;  Surgeon: Linus Chiu DPM;  Location: [facility] OR     HERNIA REPAIR       OPEN REDUCTION INTERNAL FIXATION ANKLE Left 4/17/2019    Procedure: Open reduction internal fixation ankle;  Surgeon: Linus Chiu DPM;  Location: [facility] OR         Family Medical History:     Family History   Problem Relation Age of Onset     Family History Negative Mother      Family History Negative Father          Social History:     Social History     Socioeconomic History     Marital status:      Spouse name: Not on file     Number of children: Not on file     Years of education: Not on file     Highest education level: Not on file   Occupational History     Not on file   Social Needs     Financial resource strain: Not on file     Food insecurity:     Worry: Not on file     Inability: Not on file     Transportation needs:     Medical: Not on file     Non-medical: Not on file   Tobacco Use     Smoking status: Current Every Day Smoker     Packs/day: 1.00     Years: 25.00     Pack years: 25.00     Smokeless tobacco: Never Used   Substance and Sexual Activity     Alcohol use: Yes     Comment: 2 drinks per month     Drug use: Never     Sexual activity: Yes     Partners: Male   Lifestyle     Physical activity:     Days per week: Not on file     Minutes per session: Not on file     Stress: Not on file   Relationships     Social connections:     Talks on phone: Not on file     Gets together: Not on file     Attends Druze service: Not on file     Active member of club or organization: Not on file     Attends meetings of clubs or organizations: Not on file     Relationship status: Not on file     Intimate partner violence:     Fear of current or ex partner: Not on file     Emotionally abused: Not on file     Physically abused: Not on file     Forced sexual activity: Not on file   Other Topics Concern     Parent/sibling w/ CABG, MI or angioplasty before 65F 55M? Not Asked   Social  "History Narrative     Not on file           Review of System:     Constitutional: Negative for fever or chills, positive for chronic morbid obesity  Skin: Negative for rashes  Ears/Nose/Throat: Negative for nasal congestion, sore throat  Respiratory: No shortness of breath, dyspnea on exertion, cough, or hemoptysis  Cardiovascular: Negative for chest pain  Gastrointestinal: Negative for nausea, vomiting  Genitourinary: Negative for dysuria, hematuria  Musculoskeletal: Negative for myalgias  Neurologic: Negative for headaches  Psychiatric: positive for chronic bipolar 2 disorder  Hematologic/Lymphatic/Immunologic: positive for chronic iron deficiency anemia  Endocrine: Negative  Behavioral: Negative for tobacco use       Physical Exam:   /81 (BP Location: Left arm, Patient Position: Sitting, Cuff Size: Adult Regular)   Pulse 62   Temp 98.5  F (36.9  C) (Tympanic)   Ht 1.638 m (5' 4.5\")   Wt 120.1 kg (264 lb 12.8 oz)   LMP 08/02/2019 (Exact Date)   SpO2 97%   Breastfeeding? No   BMI 44.75 kg/m      GENERAL: alert and no distress  EYES: eyes grossly normal to inspection, and conjunctivae and sclerae normal  HENT: Normocephalic atraumatic. Nose and mouth without ulcers or lesions  NECK: supple  RESP: lungs clear to auscultation   CV: regular rate and rhythm, normal S1 S2  LYMPH: no peripheral edema   ABDOMEN: morbidly obese  MS: no gross musculoskeletal defects noted  SKIN: no suspicious lesions or rashes  NEURO: Alert & Oriented x 3.   PSYCH: mentation appears normal, affect normal, no acute suicidal ideations or acute hallucinations.        Diagnostic Test Results:     Diagnostic Test Results:  Results for orders placed or performed in visit on 08/09/19   **TSH with free T4 reflex FUTURE anytime   Result Value Ref Range    TSH 1.48 0.40 - 4.00 mU/L   **Comprehensive metabolic panel FUTURE anytime   Result Value Ref Range    Sodium 138 133 - 144 mmol/L    Potassium 4.2 3.4 - 5.3 mmol/L    Chloride 107 94 " - 109 mmol/L    Carbon Dioxide 29 20 - 32 mmol/L    Anion Gap 2 (L) 3 - 14 mmol/L    Glucose 98 70 - 99 mg/dL    Urea Nitrogen 13 7 - 30 mg/dL    Creatinine 0.68 0.52 - 1.04 mg/dL    GFR Estimate >90 >60 mL/min/[1.73_m2]    GFR Estimate If Black >90 >60 mL/min/[1.73_m2]    Calcium 8.5 8.5 - 10.1 mg/dL    Bilirubin Total 0.2 0.2 - 1.3 mg/dL    Albumin 3.2 (L) 3.4 - 5.0 g/dL    Protein Total 6.5 (L) 6.8 - 8.8 g/dL    Alkaline Phosphatase 84 40 - 150 U/L    ALT 24 0 - 50 U/L    AST 18 0 - 45 U/L   **CBC with platelets FUTURE anytime   Result Value Ref Range    WBC 4.8 4.0 - 11.0 10e9/L    RBC Count 3.90 3.8 - 5.2 10e12/L    Hemoglobin 9.8 (L) 11.7 - 15.7 g/dL    Hematocrit 32.3 (L) 35.0 - 47.0 %    MCV 83 78 - 100 fl    MCH 25.1 (L) 26.5 - 33.0 pg    MCHC 30.3 (L) 31.5 - 36.5 g/dL    RDW 17.5 (H) 10.0 - 15.0 %    Platelet Count 312 150 - 450 10e9/L   Lipid panel reflex to direct LDL Fasting   Result Value Ref Range    Cholesterol 145 <200 mg/dL    Triglycerides 83 <150 mg/dL    HDL Cholesterol 49 (L) >49 mg/dL    LDL Cholesterol Calculated 79 <100 mg/dL    Non HDL Cholesterol 96 <130 mg/dL   25 OH Vit D therapy monitoring   Result Value Ref Range    25 OH Vit D2 <5 ug/L    25 OH Vit D3 36 ug/L    25 OH Vit D total <41 20 - 75 ug/L       ASSESSMENT/PLAN:       (D50.8) Iron deficiency anemia secondary to inadequate dietary iron intake  (primary encounter diagnosis)  Comment: poorly controlled chronic iron deficiency anemia, patient reports that she was not able to tolerate oral iron supplementation medications in the past.   Plan: Oncology/Hematology Adult Referral      (R53.82) Chronic fatigue  (Z78.9) Vegetarian diet  (E44.1) Mild protein-calorie malnutrition (H)  Comment: chronic fatigue symptoms in the setting of vegetarian diet and labs show mild protein calorie malnutrition.  Plan: NUTRITION REFERRAL      (E55.9) Vitamin D deficiency  Comment: recent vitamin D lab result is within normal limits  Plan: continue  current daily 2000 international unit(s) vitamin d supplementation medication going forward.      (F31.81) Bipolar 2 disorder (H)  Comment: symptoms stable without acute suicidal ideations or acute hallucinations  Plan: continue outpatient psychiatry clinic follow up and current Zyprexa psych medication going forward.        Follow Up Plan:     Patient is instructed to return to Internal Medicine clinic for follow-up visit in 1 month.        Elsa Omalley MD  Internal Medicine  [facility]

## 2019-08-22 NOTE — TELEPHONE ENCOUNTER
ONCOLOGY INTAKE: Records Information      APPT INFORMATION:  Referring provider:  Dr. Elsa Omalley MD  Referring provider s clinic:  [facility]  Reason for visit/diagnosis:  Iron deficiency anemia secondary to inadequate dietary iron intake   Has patient been notified of appointment date and time?: yes, per pt    RECORDS INFORMATION:  Were the records received with the referral (via [employer])? No, internal referral    Has patient been seen for any external appt for this diagnosis? no    If yes, where? na    Has patient had any imaging or procedures outside of [facility] for this condition? no      If Yes, where? na    ADDITIONAL INFORMATION:  na

## 2019-08-23 NOTE — TELEPHONE ENCOUNTER
RECORDS STATUS - ALL OTHER DIAGNOSIS      RECORDS RECEIVED FROM: [facility]   DATE RECEIVED: 9/10/2019   NOTES STATUS DETAILS   OFFICE NOTE from referring provider Complete  Elsa Omalley MD   OFFICE NOTE from medical oncologist N/A    DISCHARGE SUMMARY from hospital N/A    DISCHARGE REPORT from the ER N/A    OPERATIVE REPORT     MEDICATION LIST Complete  [facility]   CLINICAL TRIAL TREATMENTS TO DATE     LABS     PATHOLOGY REPORTS N/A    ANYTHING RELATED TO DIAGNOSIS Complete  Recent Labs in EPIC   GENONOMIC TESTING     TYPE:     IMAGING (NEED IMAGES & REPORT)     CT SCANS     MRI     MAMMO     ULTRASOUND     PET

## 2019-08-27 ENCOUNTER — THERAPY VISIT (OUTPATIENT)
Dept: OCCUPATIONAL THERAPY | Facility: CLINIC | Age: 39
End: 2019-08-27
Payer: COMMERCIAL

## 2019-08-27 ENCOUNTER — THERAPY VISIT (OUTPATIENT)
Dept: PHYSICAL THERAPY | Facility: CLINIC | Age: 39
End: 2019-08-27
Payer: COMMERCIAL

## 2019-08-27 DIAGNOSIS — R60.9 EDEMA: ICD-10-CM

## 2019-08-27 DIAGNOSIS — Z98.890 STATUS POST ORIF OF FRACTURE OF ANKLE: ICD-10-CM

## 2019-08-27 DIAGNOSIS — M25.531 RIGHT WRIST PAIN: ICD-10-CM

## 2019-08-27 DIAGNOSIS — Z87.81 STATUS POST ORIF OF FRACTURE OF ANKLE: ICD-10-CM

## 2019-08-27 PROCEDURE — 97530 THERAPEUTIC ACTIVITIES: CPT | Mod: GP | Performed by: PHYSICAL THERAPY ASSISTANT

## 2019-08-27 PROCEDURE — 97140 MANUAL THERAPY 1/> REGIONS: CPT | Mod: GO | Performed by: OCCUPATIONAL THERAPIST

## 2019-08-27 PROCEDURE — 97112 NEUROMUSCULAR REEDUCATION: CPT | Mod: GP | Performed by: PHYSICAL THERAPY ASSISTANT

## 2019-08-27 PROCEDURE — 97110 THERAPEUTIC EXERCISES: CPT | Mod: GP | Performed by: PHYSICAL THERAPY ASSISTANT

## 2019-08-27 PROCEDURE — 97112 NEUROMUSCULAR REEDUCATION: CPT | Mod: GO | Performed by: OCCUPATIONAL THERAPIST

## 2019-08-27 NOTE — PROGRESS NOTES
Hand Therapy Progress/Discharge Note  Current Date:   8/27/2019  Reporting Period: 7/17/2019 to 8/27/2019    Subjective:  Anders Zepeda is a 39 year old right hand dominant female.    Diagnosis: R wrist muscle strain  DOI:  1 month ago (MD order date 5/6/19)    S:  Subjective changes as noted by patient: I'm doing really good.     Functional changes noted by patient:   No difficulty  Response to previous treatment:  good  Patient has noted adverse reaction to:   None      Objective:  Pain Level (Scale 0-10):   5/15/2019 7/17/19 8/27/19   At Rest 0 0 0   With Use 7 2 0     Pain Description:  Date 5/15/2019 7/17/19 8/27/19   Location R ulnar wrist and distal forearm, will radiate to elbow if in supination with a weight ECU MM belly and at times shoots up to elbow ECU mm belly   Pain Quality Burning and Sharp Burning and shooting Achy   Frequency intermittent   intermittent Intermittent   Pain is worst  daytime or nighttime  daytime   Exacerbated by  activity--supination with weight in hand, WB Dependent on activity Dependent on activity   Relieved by rest and brace  rest and brace rest   Progression unchanged  gradually improving Gradually improving     Tenderness: Pain level report on scale 0-10/10  Date 5/15/2019  7/17/2019   Side R  R   Ulna Styloid 0  NT    TFCC 0 NT   Distal Radius 0 NT   Radial Styloid 0 NT   DRUJ 2 2   Volar Scaphoid 0 NT   Dorsal Scaphoid 0 NT   Volar Lunate 0 NT   Dorsal Lunate 0 NT   Dorsal Triquetrum 4-5 4   ECU tendon insertion 3 0   ECU muscle belly 8 5-6     Edema:  Circumference (measured in cm)  Wrist/Elbow  Date 5/15/2019 5/15/2019   Side R L   DWC  16.4 16.6     Range of Motion Wrist AROM (PROM): WNL per visual observation.  Pain present with end ranges of motion    Special Tests: pain scale of 0-10/10, MMT scale of 0-5/5  Date 5/15/2019  7/17/2019   Side R  R   ULTT ulnar nerve bias ~40% ~7/17/2019 50%   PROM of Wrist Flexion and Radial Deviation 7/10 1/10 at end range of movement    MMT of ECU Muscle 0/10, 5/5 NT   ECU Synergy Test +, 7/10 Sore, but no pain     STRENGTH: (Measured in pounds, pain scale 0-10/10)    Date 5/15/2019  7/17/2019 8/27/19      Trials Left Right Left Right Left Right Left Right Left Right Left Right   1 64 47 70 69 61 69         2               3               Avg               Pain                 3 Point Pinch  Date 5/15/2019  7/17/2019       Trials Left Right Left Right Left Right Left Right Left Right Left Right   1 14 11 14 15           2               3               Avg               Pain                 Lateral Pinch  Date 5/15/2019  7/17/2019       Trials Left Right Left Right Left Right Left Right Left Right Left Right   1 14 12 16 17           2               3               Avg               Pain                 A: Patient's symptoms are resolving.  Patient is progressing well.  Patient is independent in home exercise program.  Patient has met Short and Long Term Treatment Goals.  Patient is ready to be discharged from therapy and continue their home treatment program.    P:  Discharge from Hand Therapy; continue home program.  Patient to contact MD for tx orders should further issues arise.    Home Program:   Exercise: AROM of wrist,  PROM with stretch to wrist extensors and flexors.  Wrist stability program--begin with door frame pull backs and wall push-ups  Orthosis: [place] Thumboform   Activity: Avoid activities that exacerbate symptoms in the median nerve.  Avoid prolonged flexion or extension of the wrist.    Discharge Plan:    Achieve all LTG.  Independent in home treatment program.  Reach maximal therapeutic benefit.

## 2019-09-02 PROBLEM — M25.531 RIGHT WRIST PAIN: Status: RESOLVED | Noted: 2019-05-24 | Resolved: 2019-09-02

## 2019-09-03 ENCOUNTER — OFFICE VISIT (OUTPATIENT)
Dept: NUTRITION | Facility: CLINIC | Age: 39
End: 2019-09-03
Payer: COMMERCIAL

## 2019-09-03 VITALS — BODY MASS INDEX: 45.43 KG/M2 | WEIGHT: 268.8 LBS

## 2019-09-03 DIAGNOSIS — D50.8 IRON DEFICIENCY ANEMIA SECONDARY TO INADEQUATE DIETARY IRON INTAKE: Primary | ICD-10-CM

## 2019-09-03 DIAGNOSIS — E66.01 MORBID OBESITY (H): ICD-10-CM

## 2019-09-03 PROCEDURE — 99207 ZZC NO CHARGE LOS: CPT

## 2019-09-03 PROCEDURE — 97802 MEDICAL NUTRITION INDIV IN: CPT

## 2019-09-03 NOTE — PROGRESS NOTES
"Medical Nutrition Therapy  Visit Type:Initial assessment and intervention    Anders Zepeda presents today for MNT and education related to prediabetes, weight management and iron deficiency anemia.   She is accompanied by self.     ASSESSMENT:   Patient comments/concerns relating to nutrition: Patient is concerned that her Zyprexa is causing blood sugar to rise. She would like to stay on this medication but does not want it to cause diabetes. Additionally, she has had history of iron deficiency anemia for which she is seeing a hematologist. She has some texture aversion issues so avoids meats although is not vegetarian. She will eat meat/poultry on occasion but generally avoids it. There are other foods that she avoids as well and she states overall, eating gives her anxiety. She is often trying to \"get it over with\" when making meal choices. 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 She will then go to [employer] to get a meal after work. When she is no working, though, she states she grazes all day and can be a bottomless pit.     NUTRITION HISTORY:    Breakfast: skips   Lunch: skips   Dinner: [employer] - breakfast sandwich, muffin, sweetened iced tea   Snacks: chocolate when on period   Beverages: has significantly decreased her soda intake, chooses sugar free sweet tea instead    Misses meals? Only eats 1 meal/day on days that she is working  Eats out:  frequently     Previous diet education:  No     Food allergies/intolerances: meat aversion, only likes certain fruits & vegetables due to textures    Diet is high in: calories  Diet is low in: fiber, fruits, vegetables and iron    EXERCISE: no regular exercise program    SOCIO/ECONOMIC:   Lives with: not assessed     MEDICATIONS:  Current Outpatient Medications   Medication     OLANZapine (ZYPREXA) 5 MG tablet     No current facility-administered medications for this visit.        LABS:  Last Basic Metabolic Panel:  Lab Results   Component " Value Date     08/09/2019      Lab Results   Component Value Date    POTASSIUM 4.2 08/09/2019     Lab Results   Component Value Date    CHLORIDE 107 08/09/2019     Lab Results   Component Value Date    ALEX 8.5 08/09/2019     Lab Results   Component Value Date    CO2 29 08/09/2019     Lab Results   Component Value Date    BUN 13 08/09/2019     Lab Results   Component Value Date    CR 0.68 08/09/2019     Lab Results   Component Value Date    GLC 98 08/09/2019       ANTHROPOMETRICS:  Vitals: Wt 121.9 kg (268 lb 12.8 oz)   BMI 45.43 kg/m    Body mass index is 45.43 kg/m .      Wt Readings from Last 5 Encounters:   09/03/19 121.9 kg (268 lb 12.8 oz)   08/22/19 120.1 kg (264 lb 12.8 oz)   08/08/19 120.2 kg (265 lb)   08/08/19 117.9 kg (260 lb)   07/11/19 117.9 kg (260 lb)       Weight Change: no significant weight change - patient asking how much weight she needs to lose today     ESTIMATED KCAL REQUIREMENTS:  6139-9378 kcal per day (11-14 kcal/kg) for weight loss     NUTRITION DIAGNOSIS: Overweight/Obesity related to poor diet choices, disordered eating pattern, lack of exercise as evidenced by BMI of 45 kg/m2    NUTRITION INTERVENTION:  Nutrition Prescription: Carbohydrate Intake: 45-60 grams/meal and 15-30 grams/snacks  Iron intake: 27 mg/day   Education given to support: general nutrition guidelines, weight reduction, consistent meals, carb counting, delayed meals, exercise, behavior modification and portion control  Education Materials Provided: My Plate Planner/Choose My Plate, Carbohydrate Counting, Iron Content of Foods & Iron Deficiency Anemia MNT handouts   Motivational Interviewing    Recommended focus on 5-10% weight loss to support blood sugar control - work on this through carbohydrate control diet and increased activity    Reviewed importance of avoiding calcium when eating iron-containing foods as well as addition of vitamin C containing foods for best absorption.     PATIENT'S BEHAVIOR CHANGE  GOALS:   See Patient Instructions for patient stated behavior change goals. AVS was printed and given to patient at today's appointment.    MONITOR / EVALUATE:  RD will monitor/evaluate:  Blood Glucose / A1c  Food and nutrition knowledge / skills  Pertinent Labs  Weight change    FOLLOW-UP:  Follow up with RD as needed.  Call RD with questions/concerns.     Cynthia Durbin RD, LD   Time spent in minutes: 45  Encounter: Individual

## 2019-09-05 ENCOUNTER — THERAPY VISIT (OUTPATIENT)
Dept: PHYSICAL THERAPY | Facility: CLINIC | Age: 39
End: 2019-09-05
Payer: COMMERCIAL

## 2019-09-05 DIAGNOSIS — Z98.890 STATUS POST ORIF OF FRACTURE OF ANKLE: ICD-10-CM

## 2019-09-05 DIAGNOSIS — R60.9 EDEMA: ICD-10-CM

## 2019-09-05 DIAGNOSIS — Z87.81 STATUS POST ORIF OF FRACTURE OF ANKLE: ICD-10-CM

## 2019-09-05 PROCEDURE — 97110 THERAPEUTIC EXERCISES: CPT | Mod: GP | Performed by: PHYSICAL THERAPY ASSISTANT

## 2019-09-05 PROCEDURE — 97530 THERAPEUTIC ACTIVITIES: CPT | Mod: GP | Performed by: PHYSICAL THERAPY ASSISTANT

## 2019-09-10 ENCOUNTER — PRE VISIT (OUTPATIENT)
Dept: ONCOLOGY | Facility: CLINIC | Age: 39
End: 2019-09-10

## 2019-09-12 ENCOUNTER — THERAPY VISIT (OUTPATIENT)
Dept: PHYSICAL THERAPY | Facility: CLINIC | Age: 39
End: 2019-09-12
Payer: COMMERCIAL

## 2019-09-12 DIAGNOSIS — Z87.81 STATUS POST ORIF OF FRACTURE OF ANKLE: ICD-10-CM

## 2019-09-12 DIAGNOSIS — Z98.890 STATUS POST ORIF OF FRACTURE OF ANKLE: ICD-10-CM

## 2019-09-12 DIAGNOSIS — R60.9 EDEMA: ICD-10-CM

## 2019-09-12 PROCEDURE — 97530 THERAPEUTIC ACTIVITIES: CPT | Mod: GP | Performed by: PHYSICAL THERAPY ASSISTANT

## 2019-09-12 PROCEDURE — 97110 THERAPEUTIC EXERCISES: CPT | Mod: GP | Performed by: PHYSICAL THERAPY ASSISTANT

## 2019-09-24 ENCOUNTER — THERAPY VISIT (OUTPATIENT)
Dept: PHYSICAL THERAPY | Facility: CLINIC | Age: 39
End: 2019-09-24
Payer: COMMERCIAL

## 2019-09-24 DIAGNOSIS — Z87.81 STATUS POST ORIF OF FRACTURE OF ANKLE: ICD-10-CM

## 2019-09-24 DIAGNOSIS — R60.9 EDEMA: ICD-10-CM

## 2019-09-24 DIAGNOSIS — Z98.890 STATUS POST ORIF OF FRACTURE OF ANKLE: ICD-10-CM

## 2019-09-24 PROCEDURE — 97112 NEUROMUSCULAR REEDUCATION: CPT | Mod: GP | Performed by: PHYSICAL THERAPY ASSISTANT

## 2019-09-24 PROCEDURE — 97530 THERAPEUTIC ACTIVITIES: CPT | Mod: GP | Performed by: PHYSICAL THERAPY ASSISTANT

## 2019-09-24 PROCEDURE — 97110 THERAPEUTIC EXERCISES: CPT | Mod: GP | Performed by: PHYSICAL THERAPY ASSISTANT

## 2019-09-27 ENCOUNTER — HEALTH MAINTENANCE LETTER (OUTPATIENT)
Age: 39
End: 2019-09-27

## 2019-09-30 ENCOUNTER — PATIENT OUTREACH (OUTPATIENT)
Dept: ONCOLOGY | Facility: CLINIC | Age: 39
End: 2019-09-30

## 2019-09-30 NOTE — PROGRESS NOTES
Patient referred for iron deficiency anemia. All records are in [facility].    I called and LVM introducing myself and my role. I provided a brief description of our location an appointment details.    Will continue to follow as needed.  Dixie Martines RN

## 2019-10-01 ENCOUNTER — ONCOLOGY VISIT (OUTPATIENT)
Dept: ONCOLOGY | Facility: CLINIC | Age: 39
End: 2019-10-01
Attending: INTERNAL MEDICINE
Payer: COMMERCIAL

## 2019-10-01 ENCOUNTER — HOSPITAL ENCOUNTER (OUTPATIENT)
Facility: CLINIC | Age: 39
Setting detail: SPECIMEN
Discharge: HOME | End: 2019-10-01
Attending: INTERNAL MEDICINE | Admitting: INTERNAL MEDICINE
Payer: COMMERCIAL

## 2019-10-01 ENCOUNTER — PRE VISIT (OUTPATIENT)
Dept: ONCOLOGY | Facility: CLINIC | Age: 39
End: 2019-10-01

## 2019-10-01 VITALS
HEIGHT: 65 IN | HEART RATE: 69 BPM | TEMPERATURE: 97.7 F | BODY MASS INDEX: 45.12 KG/M2 | RESPIRATION RATE: 16 BRPM | DIASTOLIC BLOOD PRESSURE: 71 MMHG | SYSTOLIC BLOOD PRESSURE: 126 MMHG | WEIGHT: 270.8 LBS | OXYGEN SATURATION: 98 %

## 2019-10-01 DIAGNOSIS — N92.4 EXCESSIVE BLEEDING IN PREMENOPAUSAL PERIOD: ICD-10-CM

## 2019-10-01 DIAGNOSIS — D50.8 IRON DEFICIENCY ANEMIA SECONDARY TO INADEQUATE DIETARY IRON INTAKE: ICD-10-CM

## 2019-10-01 DIAGNOSIS — T88.7XXA SIDE EFFECT OF MEDICATION: ICD-10-CM

## 2019-10-01 DIAGNOSIS — D64.9 ANEMIA, UNSPECIFIED TYPE: Primary | ICD-10-CM

## 2019-10-01 DIAGNOSIS — D50.0 IRON DEFICIENCY ANEMIA DUE TO CHRONIC BLOOD LOSS: ICD-10-CM

## 2019-10-01 LAB
BASOPHILS # BLD AUTO: 0 10E9/L (ref 0–0.2)
BASOPHILS NFR BLD AUTO: 0.3 %
DIFFERENTIAL METHOD BLD: ABNORMAL
EOSINOPHIL # BLD AUTO: 0.2 10E9/L (ref 0–0.7)
EOSINOPHIL NFR BLD AUTO: 2.6 %
ERYTHROCYTE [DISTWIDTH] IN BLOOD BY AUTOMATED COUNT: 17.4 % (ref 10–15)
FERRITIN SERPL-MCNC: 2 NG/ML (ref 12–150)
HCT VFR BLD AUTO: 32.9 % (ref 35–47)
HGB BLD-MCNC: 9.9 G/DL (ref 11.7–15.7)
IMM GRANULOCYTES # BLD: 0 10E9/L (ref 0–0.4)
IMM GRANULOCYTES NFR BLD: 0 %
IRON SATN MFR SERPL: 5 % (ref 15–46)
IRON SERPL-MCNC: 24 UG/DL (ref 35–180)
LYMPHOCYTES # BLD AUTO: 2.7 10E9/L (ref 0.8–5.3)
LYMPHOCYTES NFR BLD AUTO: 43.1 %
MCH RBC QN AUTO: 24.5 PG (ref 26.5–33)
MCHC RBC AUTO-ENTMCNC: 30.1 G/DL (ref 31.5–36.5)
MCV RBC AUTO: 81 FL (ref 78–100)
MONOCYTES # BLD AUTO: 0.3 10E9/L (ref 0–1.3)
MONOCYTES NFR BLD AUTO: 4.4 %
NEUTROPHILS # BLD AUTO: 3.1 10E9/L (ref 1.6–8.3)
NEUTROPHILS NFR BLD AUTO: 49.6 %
NRBC # BLD AUTO: 0 10*3/UL
NRBC BLD AUTO-RTO: 0 /100
PLATELET # BLD AUTO: 358 10E9/L (ref 150–450)
RBC # BLD AUTO: 4.04 10E12/L (ref 3.8–5.2)
RETICS # AUTO: 55.3 10E9/L (ref 25–95)
RETICS/RBC NFR AUTO: 1.4 % (ref 0.5–2)
TIBC SERPL-MCNC: 502 UG/DL (ref 240–430)
VIT B12 SERPL-MCNC: 219 PG/ML (ref 193–986)
WBC # BLD AUTO: 6.2 10E9/L (ref 4–11)

## 2019-10-01 PROCEDURE — G0463 HOSPITAL OUTPT CLINIC VISIT: HCPCS

## 2019-10-01 PROCEDURE — 83540 ASSAY OF IRON: CPT | Performed by: INTERNAL MEDICINE

## 2019-10-01 PROCEDURE — 36415 COLL VENOUS BLD VENIPUNCTURE: CPT

## 2019-10-01 PROCEDURE — 99204 OFFICE O/P NEW MOD 45 MIN: CPT | Performed by: INTERNAL MEDICINE

## 2019-10-01 PROCEDURE — 82728 ASSAY OF FERRITIN: CPT | Performed by: INTERNAL MEDICINE

## 2019-10-01 PROCEDURE — 83550 IRON BINDING TEST: CPT | Performed by: INTERNAL MEDICINE

## 2019-10-01 PROCEDURE — 85025 COMPLETE CBC W/AUTO DIFF WBC: CPT | Performed by: INTERNAL MEDICINE

## 2019-10-01 PROCEDURE — 82607 VITAMIN B-12: CPT | Performed by: INTERNAL MEDICINE

## 2019-10-01 PROCEDURE — 85045 AUTOMATED RETICULOCYTE COUNT: CPT | Performed by: INTERNAL MEDICINE

## 2019-10-01 RX ORDER — SWAB
1 SWAB, NON-MEDICATED MISCELLANEOUS DAILY
COMMUNITY
Start: 2019-10-01 | End: 2022-10-18

## 2019-10-01 RX ORDER — HEPARIN SODIUM (PORCINE) LOCK FLUSH IV SOLN 100 UNIT/ML 100 UNIT/ML
5 SOLUTION INTRAVENOUS
Status: CANCELLED | OUTPATIENT
Start: 2019-10-01

## 2019-10-01 RX ORDER — HEPARIN SODIUM,PORCINE 10 UNIT/ML
5 VIAL (ML) INTRAVENOUS
Status: CANCELLED | OUTPATIENT
Start: 2019-10-01

## 2019-10-01 ASSESSMENT — MIFFLIN-ST. JEOR: SCORE: 1891.28

## 2019-10-01 ASSESSMENT — PAIN SCALES - GENERAL: PAINLEVEL: NO PAIN (0)

## 2019-10-01 NOTE — PROGRESS NOTES
"Medical Assistant Note:  Anders Zepeda presents today for blood draw.    Patient seen by provider today: Yes: Demetrio.   present during visit today: Not Applicable.    Concerns: No Concerns.    Procedure:  Lab draw site: LAC, Needle type: BF, Gauge: 21.    Post Assessment:  Labs drawn without difficulty: Yes.    Discharge Plan:  Departure Mode: Ambulatory.    Face to Face Time: 5 MIN  .    Coral Wang CMA        Oncology Rooming Note    October 1, 2019 8:51 AM   Anders Zepeda is a 39 year old female who presents for:    Chief Complaint   Patient presents with     Oncology Clinic Visit     Initial Vitals: /71   Pulse 69   Temp 97.7  F (36.5  C) (Oral)   Resp 16   Ht 1.638 m (5' 4.5\")   Wt 122.8 kg (270 lb 12.8 oz)   SpO2 98%   BMI 45.76 kg/m   Estimated body mass index is 45.76 kg/m  as calculated from the following:    Height as of this encounter: 1.638 m (5' 4.5\").    Weight as of this encounter: 122.8 kg (270 lb 12.8 oz). Body surface area is 2.36 meters squared.  No Pain (0) Comment: Data Unavailable   No LMP recorded.  Allergies reviewed: Yes  Medications reviewed: Yes    Medications: Medication refills not needed today.  Pharmacy name entered into Mem[facility]: [facility] [id number]    Clinical concerns:  doctor was notified.      Coral Wang CMA            "

## 2019-10-01 NOTE — PATIENT INSTRUCTIONS
1. Labs today sls.  2. IV injectafer x 2 doses in next few days. Side-effects of injectafer.  3. Stop smoking.  4. Gynaecology appointment.  5. Follow-up in 6 months with labs.

## 2019-10-01 NOTE — PROGRESS NOTES
Visit Date:   10/01/2019      This consult has been requested by Dr. Omalley for iron deficiency anemia.      Ms. Zepeda is a 39-year-old female with chronic anemia.  The patient says that she has been anemic for almost 20 years.  In the past, she has received IV iron.  She has tried oral iron.  She cannot tolerate it.  She gets severe abdominal pain/cramping and constipation.      Her iron deficiency anemia is from menorrhagia.  In general, she eats fairly good and thinks she gets enough iron through her food.  She does have menorrhagia.  She has menstrual cycle about every 28 days.  She bleeds for 8 days.  First 4 days are heavy and after that it gets light.  She has had this menorrhagia for many years.      Lately she has been getting more tired.  The patient says that her fatigue has been slowly getting worse.  She gets some lightheadedness.  Occasional headache.  No visual problem.  No chest pain.  She gets short of breath on exertion.  No shortness of breath at rest.  No abdominal pain, nausea or vomiting.  No urinary complaints.  No bowel problem.  No blood in the urine or stool.  She gets some leg cramps.      All other review of systems negative.      ALLERGIES:  Reviewed.      MEDICATIONS:  Reviewed.      PAST MEDICAL HISTORY:   1.  Bipolar disorder.   2.  Chronic anemia.   3.  Iron deficiency.   4.  Menorrhagia.   5.  Insomnia.   6.  Obesity.   7.  Tubal ligation.   8.   twice.   9.  Incisional hernia repair.   10.  Left ankle surgery.      SOCIAL HISTORY:   -She is a chronic smoker of a pack per day since the age of 15.   -Occasional alcohol use.      FAMILY HISTORY:   -Parents are .  They did not have any major medical problems.   -She has 3 siblings.  As far as she knows, they are in good health.      PHYSICAL EXAMINATION:   GENERAL:  The patient was alert and oriented x 3.   VITAL SIGNS:  Reviewed.  ECOG PS of 1.   The rest of the systems not examined.      LABORATORY DATA:  Reviewed.      1.  On 01/18/2011, hemoglobin 10.6 with MCV of 82.  Normal WBC and platelets.   2.  On 01/25/2011:   -Iron 27 and saturation of 6%.   -Normal vitamin B2.   -Normal folate.   3.  Multiple labs were done on 08/09/2019.   -CMP essentially normal.   -TSH normal.   -WBC 4.8, hemoglobin 9.8 and platelets of 312.  MCV of 83.      ASSESSMENT:   1.  A 39-year-old female with chronic normocytic anemia secondary to iron deficiency.   2.  Iron deficiency secondary to menorrhagia.   3.  Menorrhagia.   4.  Other medical problems including bipolar disorder.      RECOMMENDATIONS:   1.  Labs today including CBC and iron studies.   2.  IV Injectafer x 2 doses.   3.  OB/GYN appointment for menorrhagia.   4.  Stop smoking.   5.  Follow up in 6 months with labs.      DISCUSSION:   1.  I had a long discussion with the patient regarding anemia.  She has chronic anemia for 20 years.  She has documented iron deficiency.  Her anemia is due to iron deficiency. She has menorrhagia which is causing the iron deficiency.  She has not been evaluated by gynecologist.     2.  Discussed regarding treatment.  Menorrhagia needs to be controlled. Advised her to see a gynecologist.  Discussed regarding different options including oral contraceptive pill and endometrial ablation.      She is symptomatic from anemia.  She needs iron replacement.  She cannot tolerate oral iron.  Discussed regarding IV Injectafer.  Side effects of Injectafer including anaphylactic reaction, abdominal discomfort, nausea, vomiting and other side-effects were discussed.  She is agreeable for it.  It will be scheduled.      3. Because of menorrhagia, she is at risk of iron deficiency in the future.  We will see him in 6 months' time with labs.     4.  She is a chronic smoker.  Advised her to quit smoking.  Also advised her to discuss with her primary care physician regarding getting a screening CT chest.     5.  The patient had multiple questions, which were all answered.  I  will see her in 6 months' time.  Advised her to see a physician sooner if she has worsening weakness, chest pain, shortness of breath, muscle cramps or any other concerns.     Thanks for the consult.    ADDENDUM: Labs from today reveals hemoglobin of 9.9.  Iron of 24, saturation of 5% and ferritin of 2.     Total face to face time spent was 45 minutes, more than 50% of the time spent in counseling and coordination of care.         ANTOINE HERNANDEZ MD             D: 10/01/2019   T: 10/01/2019   MT: INEZ      Name:     JUVENTINO THOMAS   MRN:      [id number]        Account:      [id number]   :      1980           Visit Date:   10/01/2019      Document: [id number]

## 2019-10-01 NOTE — Clinical Note
"    10/1/2019         RE: Anders Zepeda  [address]  [facility] [address]        Dear Colleague,    Thank you for referring your patient, Anders Zepeda, to the [facility]. Please see a copy of my visit note below.    Medical Assistant Note:  Anders Zepeda presents today for blood draw.    Patient seen by provider today: Yes: Demetrio.   present during visit today: Not Applicable.    Concerns: No Concerns.    Procedure:  Lab draw site: LAC, Needle type: BF, Gauge: 21.    Post Assessment:  Labs drawn without difficulty: Yes.    Discharge Plan:  Departure Mode: Ambulatory.    Face to Face Time: 5 MIN  .    Coral Wang CMA        Oncology Rooming Note    October 1, 2019 8:51 AM   Anders Zepeda is a 39 year old female who presents for:    Chief Complaint   Patient presents with     Oncology Clinic Visit     Initial Vitals: /71   Pulse 69   Temp 97.7  F (36.5  C) (Oral)   Resp 16   Ht 1.638 m (5' 4.5\")   Wt 122.8 kg (270 lb 12.8 oz)   SpO2 98%   BMI 45.76 kg/m    Estimated body mass index is 45.76 kg/m  as calculated from the following:    Height as of this encounter: 1.638 m (5' 4.5\").    Weight as of this encounter: 122.8 kg (270 lb 12.8 oz). Body surface area is 2.36 meters squared.  No Pain (0) Comment: Data Unavailable   No LMP recorded.  Allergies reviewed: Yes  Medications reviewed: Yes    Medications: Medication refills not needed today.  Pharmacy name entered into [facility]: [facility] [address] [place] [address]    Clinical concerns:  doctor was notified.      Coral Wang CMA              Visit Date:   10/01/2019      This consult has been requested by Dr. Omalley for iron deficiency anemia.      Ms. Zepeda is a 39-year-old female with chronic anemia.  The patient says that she has been anemic for almost 20 years.  In the past, she has received IV iron.  She has tried oral iron.  She cannot tolerate it.  She gets severe abdominal " pain/cramping and constipation.      Her iron deficiency anemia is from menorrhagia.  In general, she eats fairly good and thinks she gets enough iron through her food.  She does have menorrhagia.  She has menstrual cycle about every 28 days.  She bleeds for 8 days.  First 4 days are heavy and after that it gets light.  She has had this menorrhagia for many years.      Lately she has been getting more tired.  The patient says that her fatigue has been slowly getting worse.  She gets some lightheadedness.  Occasional headache.  No visual problem.  No chest pain.  She gets short of breath on exertion.  No shortness of breath at rest.  No abdominal pain, nausea or vomiting.  No urinary complaints.  No bowel problem.  No blood in the urine or stool.  She gets some leg cramps.      All other review of systems negative.      ALLERGIES:  Reviewed.      MEDICATIONS:  Reviewed.      PAST MEDICAL HISTORY:   1.  Bipolar disorder.   2.  Chronic anemia.   3.  Iron deficiency.   4.  Menorrhagia.   5.  Insomnia.   6.  Obesity.   7.  Tubal ligation.   8.   twice.   9.  Incisional hernia repair.   10.  Left ankle surgery.      SOCIAL HISTORY:   -She is a chronic smoker of a pack per day since the age of 15.   -Occasional alcohol use.      FAMILY HISTORY:   -Parents are .  They did not have any major medical problems.   -She has 3 siblings.  As far as she knows, they are in good health.      PHYSICAL EXAMINATION:   GENERAL:  The patient was alert and oriented x 3.   VITAL SIGNS:  Reviewed.  ECOG PS of 1.   The rest of the systems not examined.      LABORATORY DATA:  Reviewed.     1.  On 2011, hemoglobin 10.6 with MCV of 82.  Normal WBC and platelets.   2.  On 2011:   -Iron 27 and saturation of 6%.   -Normal vitamin B2.   -Normal folate.   3.  Multiple labs were done on 2019.   -CMP essentially normal.   -TSH normal.   -WBC 4.8, hemoglobin 9.8 and platelets of 312.  MCV of 83.      ASSESSMENT:   1.   A 39-year-old female with chronic normocytic anemia secondary to iron deficiency.   2.  Iron deficiency secondary to menorrhagia.   3.  Menorrhagia.   4.  Other medical problems including bipolar disorder.      RECOMMENDATIONS:   1.  Labs today including CBC and iron studies.   2.  IV Injectafer x 2 doses.   3.  OB/GYN appointment for menorrhagia.   4.  Stop smoking.   5.  Follow up in 6 months with labs.      DISCUSSION:   1.  I had a long discussion with the patient regarding anemia.  She has chronic anemia for 20 years.  She has documented iron deficiency.  Her anemia is due to iron deficiency. She has menorrhagia which is causing the iron deficiency.  She has not been evaluated by gynecologist.     2.  Discussed regarding treatment.  Menorrhagia needs to be controlled. Advised her to see a gynecologist.  Discussed regarding different options including oral contraceptive pill and endometrial ablation.      She is symptomatic from anemia.  She needs iron replacement.  She cannot tolerate oral iron.  Discussed regarding IV Injectafer.  Side effects of Injectafer including anaphylactic reaction, abdominal discomfort, nausea, vomiting and other side-effects were discussed.  She is agreeable for it.  It will be scheduled.      3. Because of menorrhagia, she is at risk of iron deficiency in the future.  We will see him in 6 months' time with labs.     4.  She is a chronic smoker.  Advised her to quit smoking.  Also advised her to discuss with her primary care physician regarding getting a screening CT chest.     5.  The patient had multiple questions, which were all answered.  I will see her in 6 months' time.  Advised her to see a physician sooner if she has worsening weakness, chest pain, shortness of breath, muscle cramps or any other concerns.     Thanks for the consult.    ADDENDUM: Labs from today reveals hemoglobin of 9.9.  Iron of 24, saturation of 5% and ferritin of 2.     Total face to face time spent was 45  minutes, more than 50% of the time spent in counseling and coordination of care.         ANTOINE HERNANDEZ MD             D: 10/01/2019   T: 10/01/2019   MT: WT      Name:     JUVENTINO THOMAS   MRN:      [id number]        Account:      [id number]   :      1980           Visit Date:   10/01/2019      Document: [id number]        Again, thank you for allowing me to participate in the care of your patient.        Sincerely,        Antoine Hernandez MD

## 2019-10-01 NOTE — RESULT ENCOUNTER NOTE
Dear Ms. Zepeda,    Blood test from today reveals low hemoglobin of 9.9.  Your iron and ferritin are very low.  This should improve with IV iron.    Please, call me with any questions.    Jorge Atkinson MD

## 2019-10-03 ENCOUNTER — INFUSION THERAPY VISIT (OUTPATIENT)
Dept: INFUSION THERAPY | Facility: CLINIC | Age: 39
End: 2019-10-03
Attending: INTERNAL MEDICINE
Payer: COMMERCIAL

## 2019-10-03 VITALS
SYSTOLIC BLOOD PRESSURE: 119 MMHG | HEART RATE: 80 BPM | RESPIRATION RATE: 18 BRPM | OXYGEN SATURATION: 100 % | DIASTOLIC BLOOD PRESSURE: 86 MMHG | TEMPERATURE: 97.8 F

## 2019-10-03 DIAGNOSIS — D50.0 IRON DEFICIENCY ANEMIA DUE TO CHRONIC BLOOD LOSS: Primary | ICD-10-CM

## 2019-10-03 DIAGNOSIS — T88.7XXA SIDE EFFECT OF MEDICATION: ICD-10-CM

## 2019-10-03 PROCEDURE — 25800030 ZZH RX IP 258 OP 636: Performed by: INTERNAL MEDICINE

## 2019-10-03 PROCEDURE — 96365 THER/PROPH/DIAG IV INF INIT: CPT

## 2019-10-03 PROCEDURE — 25000128 H RX IP 250 OP 636: Performed by: INTERNAL MEDICINE

## 2019-10-03 RX ORDER — HEPARIN SODIUM (PORCINE) LOCK FLUSH IV SOLN 100 UNIT/ML 100 UNIT/ML
5 SOLUTION INTRAVENOUS
Status: CANCELLED | OUTPATIENT
Start: 2019-10-10

## 2019-10-03 RX ORDER — HEPARIN SODIUM,PORCINE 10 UNIT/ML
5 VIAL (ML) INTRAVENOUS
Status: CANCELLED | OUTPATIENT
Start: 2019-10-10

## 2019-10-03 RX ADMIN — SODIUM CHLORIDE 250 ML: 9 INJECTION, SOLUTION INTRAVENOUS at 15:14

## 2019-10-03 RX ADMIN — FERRIC CARBOXYMALTOSE INJECTION 750 MG: 50 INJECTION, SOLUTION INTRAVENOUS at 15:14

## 2019-10-03 ASSESSMENT — PAIN SCALES - GENERAL: PAINLEVEL: NO PAIN (0)

## 2019-10-03 NOTE — PROGRESS NOTES
Infusion Nursing Note:  Anders Zepeda presents today for Injectafer.    Patient seen by provider today: No    Note: First time getting injectafertoday. Oriented to infusion center. Injectafer teaching, side effects, and schedule reviewed with patient.  Pt stated understanding of plan. Patient tolerated infusion well without issues.    Intravenous Access:  Peripheral IV placed.      Treatment Conditions:  Not Applicable.      Post Infusion Assessment:  Patient tolerated infusion without incident.  Blood return noted pre and post infusion.  Site patent and intact, free from redness, edema or discomfort.  No evidence of extravasations.  Access discontinued per protocol.    Discharge Plan:   Patient declined prescription refills.  Discharge instructions reviewed with: Patient.  Patient and/or family verbalized understanding of discharge instructions and all questions answered.  AVS to patient via Vator.  Patient will return 10/10/19 for next appointment.   Patient discharged in stable condition accompanied by: self.  Departure Mode: Ambulatory.    Roula Carmen, RN, RN

## 2019-10-10 ENCOUNTER — INFUSION THERAPY VISIT (OUTPATIENT)
Dept: INFUSION THERAPY | Facility: CLINIC | Age: 39
End: 2019-10-10
Attending: INTERNAL MEDICINE
Payer: COMMERCIAL

## 2019-10-10 VITALS
HEART RATE: 73 BPM | OXYGEN SATURATION: 98 % | DIASTOLIC BLOOD PRESSURE: 75 MMHG | SYSTOLIC BLOOD PRESSURE: 125 MMHG | TEMPERATURE: 98.1 F | RESPIRATION RATE: 20 BRPM

## 2019-10-10 DIAGNOSIS — D50.0 IRON DEFICIENCY ANEMIA DUE TO CHRONIC BLOOD LOSS: Primary | ICD-10-CM

## 2019-10-10 DIAGNOSIS — T88.7XXA SIDE EFFECT OF MEDICATION: ICD-10-CM

## 2019-10-10 PROCEDURE — 25000128 H RX IP 250 OP 636: Performed by: INTERNAL MEDICINE

## 2019-10-10 PROCEDURE — 25800030 ZZH RX IP 258 OP 636: Performed by: INTERNAL MEDICINE

## 2019-10-10 PROCEDURE — 96365 THER/PROPH/DIAG IV INF INIT: CPT

## 2019-10-10 RX ORDER — HEPARIN SODIUM (PORCINE) LOCK FLUSH IV SOLN 100 UNIT/ML 100 UNIT/ML
5 SOLUTION INTRAVENOUS
Status: CANCELLED | OUTPATIENT
Start: 2019-10-10

## 2019-10-10 RX ORDER — HEPARIN SODIUM,PORCINE 10 UNIT/ML
5 VIAL (ML) INTRAVENOUS
Status: CANCELLED | OUTPATIENT
Start: 2019-10-10

## 2019-10-10 RX ADMIN — FERRIC CARBOXYMALTOSE INJECTION 750 MG: 50 INJECTION, SOLUTION INTRAVENOUS at 14:59

## 2019-10-10 RX ADMIN — SODIUM CHLORIDE 250 ML: 9 INJECTION, SOLUTION INTRAVENOUS at 14:58

## 2019-10-10 ASSESSMENT — PAIN SCALES - GENERAL: PAINLEVEL: NO PAIN (0)

## 2019-10-10 NOTE — PROGRESS NOTES
Infusion Nursing Note:  Anders Zepeda presents today for Injectafer.    Patient seen by provider today: No   present during visit today: Not Applicable.    Note: N/A.    Intravenous Access:  Peripheral IV placed.    Treatment Conditions:  Not Applicable.      Post Infusion Assessment:  Patient tolerated infusion without incident.  Patient observed for 30 minutes post Injectafer per protocol.  Blood return noted pre and post infusion.  Site patent and intact, free from redness, edema or discomfort.  No evidence of extravasations.  Access discontinued per protocol.       Discharge Plan:   Patient declined prescription refills.  Discharge instructions reviewed with: Patient.  Patient verbalized understanding of discharge instructions and all questions answered.  AVS to patient via [email or web address].  Patient will return as scheduled for next appointment.   Patient discharged in stable condition accompanied by: self.  Departure Mode: Ambulatory.    Kaylan Veloz RN

## 2019-10-17 ENCOUNTER — OFFICE VISIT (OUTPATIENT)
Dept: PODIATRY | Facility: CLINIC | Age: 39
End: 2019-10-17
Payer: COMMERCIAL

## 2019-10-17 VITALS — BODY MASS INDEX: 44.98 KG/M2 | HEIGHT: 65 IN | WEIGHT: 270 LBS

## 2019-10-17 DIAGNOSIS — Z87.81 STATUS POST ORIF OF FRACTURE OF ANKLE: Primary | ICD-10-CM

## 2019-10-17 DIAGNOSIS — Z98.890 STATUS POST ORIF OF FRACTURE OF ANKLE: Primary | ICD-10-CM

## 2019-10-17 PROCEDURE — 99213 OFFICE O/P EST LOW 20 MIN: CPT | Performed by: PODIATRIST

## 2019-10-17 ASSESSMENT — MIFFLIN-ST. JEOR: SCORE: 1887.65

## 2019-10-17 NOTE — PROGRESS NOTES
Foot & Ankle Surgery  October 17, 2019    S:  Patient in today approx 6 mo sp left ankle scope, ORIF distal fibular fracture nonunion, and P.brevis repair.  Pain levels minimal.  Her therapist noted she was putting more weight on her right foot, jenni on stairs, and Veronica indicates that she's quick to get off her left foot on stairs, out of concern for falling, but indicates it's not for any functional limitations or pain levels.  She's just quick to get off the foot    There were no vitals taken for this visit.      ROS - positive for CC.  Patient denies current nausea, vomiting, chills, fevers, belly pain, calf pain, chest pain or SOB.  Complete remainder of ROS is otherwise neg.    PE - incisions healed.  Unable to elicit pain at the fibula or peroneal tendons, and no pain with stressing of the ankle.  Skin shows no trophic, color or temperature changes otherwise.  No calf redness, swelling or pain noted otherwise.    A/P - 39 year old yo patient approx 6 mo sp above procedure  -no xrays today as she's having no pain or limitations.    -clinically, doing well.  Continue activities and shoes as tolerated    Follow up  -  12 mo from DOS or sooner with acute issues    Plan for [id number] - already workign     There is no height or weight on file to calculate BMI.  Weight management plan: Patient was referred to their PCP to discuss a diet and exercise plan.      Linus Chiu DPM FACFAS FACFAOM  Podiatric Foot & Ankle Surgeon  [facility]  [phone]

## 2019-10-17 NOTE — PATIENT INSTRUCTIONS
Thank you for choosing [facility]!    DR. LAWS'S CLINIC LOCATIONS:   MONDAY - [place] TUESDAY - [place]   [address] [facility]  [address]   [place] [address] [place] [address]   [phone]       THURSDAY AM - [place] THURSDAY PM - [place]   [address]   [place] [address] [place] [address]   [phone]       FRIDAY AM - [place] SET UP SURGERY: [phone] [address] APPOINTMENTS: [phone]   [place] [address] BILLING QUESTIONS: [phone] FAX NUMBER: [phone]       Follow Up: 12 months from Surgery Date      FYI: The following information is included in the after visit summary for all patients:  Body weight can be a sensitive issue to discuss in clinic, but we think the following information is very important. Although we focus on the feet and ankles, we do support the overall health of our patients. Many things can cause foot and ankle problems. Foot structure, activity level, foot mechanics and injuries are common causes of pain. One very important issue that often goes unmentioned, is body weight. Extra weight can cause increased stress on muscles, ligaments, bones and tendons. Sometimes just a few extra pounds is all it takes to put one over her/his threshold. Without reducing that stress, it can be difficult to alleviate pain. As Foot & Ankle specialists, our job is addressing the lower extremity problem and possible causes. Regarding extra body weight, we encourage patients to discuss diet and weight management plans with their primary care doctors. It is this team approach that gives you the best opportunity for pain relief and getting you back on your feet. [facility] has a Comprehensive Weight Management Program. This program includes counseling, education, non-surgical and surgical approaches to weight loss. If you are interested in learning more  either talk to you primary care provider or call [phone].

## 2019-10-17 NOTE — LETTER
10/17/2019         RE: Anders Zepeda  [address]  [facility] [address]        Dear Colleague,    Thank you for referring your patient, Anders Zepeda, to the [facility]. Please see a copy of my visit note below.    Foot & Ankle Surgery  October 17, 2019    S:  Patient in today approx 6 mo sp left ankle scope, ORIF distal fibular fracture nonunion, and P.brevis repair.  Pain levels minimal.  Her therapist noted she was putting more weight on her right foot, jenni on stairs, and Veronica indicates that she's quick to get off her left foot on stairs, out of concern for falling, but indicates it's not for any functional limitations or pain levels.  She's just quick to get off the foot    There were no vitals taken for this visit.      ROS - positive for CC.  Patient denies current nausea, vomiting, chills, fevers, belly pain, calf pain, chest pain or SOB.  Complete remainder of ROS is otherwise neg.    PE - incisions healed.  Unable to elicit pain at the fibula or peroneal tendons, and no pain with stressing of the ankle.  Skin shows no trophic, color or temperature changes otherwise.  No calf redness, swelling or pain noted otherwise.    A/P - 39 year old yo patient approx 6 mo sp above procedure  -no xrays today as she's having no pain or limitations.    -clinically, doing well.  Continue activities and shoes as tolerated    Follow up  -  12 mo from DOS or sooner with acute issues    Plan for [id number] - already workign     There is no height or weight on file to calculate BMI.  Weight management plan: Patient was referred to their PCP to discuss a diet and exercise plan.      Linus Chiu DPM FACFAS FACFAOM  Podiatric Foot & Ankle Surgeon  [facility]  [phone]        Again, thank you for allowing me to participate in the care of your patient.        Sincerely,        Linus Chiu DPM, DARYA

## 2019-10-30 NOTE — PROGRESS NOTES
Anders is a 39 year old  female who presents for annual exam.     Besides routine health maintenance, patient has just had an iron infusion due to anemia. Patient is having very heavy and painful periods that may be the cause of this, and was told to follow-up with Gyn.    HPI:  Here to evaluate longstanding menorrhagia. Pt has monthly bleeds that are heavy. Lasting 8 days. No intermenstrual bleeding. Has chronic anemia. Gets Iron infusions due to anemia and intolerance to PO iron.   S/P two  sections and tubal ligation. According to the patient the uterus is abnormally shaped. Had incisional hernia at  section scar. Had mesh repair.   Pt is a chronic smoker of a pack per day. PMH significant for bipolar disorder.    The patient's PCP is Dr Elsa Omalley MD.      GYNECOLOGIC HISTORY:    Patient's last menstrual period was 10/17/2019.    Regular menses? No, very heavy  Menses every 26 days.  Length of menses: 8-9 days    Her current contraception method is: tubal ligation.  She  reports that she has been smoking. She has a 25.00 pack-year smoking history. She has never used smokeless tobacco.  Tobacco Cessation Action Plan: patient has been working on this  Patient is sexually active.  STD testing offered?  Declined     Last PHQ-9 score on record =   PHQ-9 SCORE 10/31/2019   PHQ-9 Total Score MyChart -   PHQ-9 Total Score 2     Last GAD7 score on record =   BRADY-7 SCORE 10/31/2019   Total Score -   Total Score 2     Alcohol Score = 1    HEALTH MAINTENANCE:  Cholesterol: followed by primary care provider  Recent Labs   Lab Test 19  1342   CHOL 145   HDL 49*   LDL 79   TRIG 83   GLUCOSE 98     Last Mammo: due for baseline  Last Pap: 16 Negative, due for co-test  Colonoscopy:  N/A, Result: not applicable, Next Colonoscopy: screen age 49  Dexa:  never  Health maintenance updated:  yes    HISTORY:  OB History    Para Term  AB Living   5 2 2 0 3 0   SAB TAB Ectopic Multiple  Live Births   3 0 0 0 0      # Outcome Date GA Lbr Lvei/2nd Weight Sex Delivery Anes PTL Lv   5 Term 2000     -SEC      4 Term 1998     -SEC      3 SAB            2 SAB            1 SAB                Patient Active Problem List   Diagnosis     Morbid obesity (H)     Status post ORIF of fracture of ankle     Edema     Cough     Post-nasal drip     Nasal congestion     Sinus pressure     Medication side effects     Bipolar 2 disorder (H)     Primary insomnia     Iron deficiency anemia due to chronic blood loss     Side effect of medication     Past Surgical History:   Procedure Laterality Date     ABDOMEN SURGERY      c section     ARTHROSCOPY ANKLE, OPEN REPAIR TENDON, COMBINED Left 2019    Procedure: 1.  Ankle arthroscopy with extensive synovectomy, left lower extremity.  2.  Peroneus brevis debridement, repair, left lower extremity.  3.  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  4.  Open reduction and internal fixation, left distal fibular fracture nonunion.  5.  Stressing of left ankle under anesthesia.   ;  Surgeon: Linus Chiu DPM;  Location: [facility]      SECTION  1998      SECTION  2000     HERNIA REPAIR       OPEN REDUCTION INTERNAL FIXATION ANKLE Left 2019    Procedure: Open reduction internal fixation ankle;  Surgeon: Linus Chiu DPM;  Location: [facility]     TUBAL LIGATION        Social History     Tobacco Use     Smoking status: Current Every Day Smoker     Packs/day: 1.00     Years: 25.00     Pack years: 25.00     Smokeless tobacco: Never Used   Substance Use Topics     Alcohol use: Yes     Comment: 2 drinks per month      Problem (# of Occurrences) Relation (Name,Age of Onset)    Family History Negative (1) Father    Uterine Cancer (1) Mother            Current Outpatient Medications   Medication Sig     OLANZapine (ZYPREXA) 5 MG tablet Take 1 tablet (5 mg) by mouth At Bedtime     vitamin D3 (CHOLECALCIFEROL) 400 units  "capsule Take 1 capsule (400 Units) by mouth daily     No current facility-administered medications for this visit.      Allergies   Allergen Reactions     Erythromycin      Other reaction(s): Vomiting. PATIENT DOES NOT BELIEVE THIS IS AN ISSUE.       Past medical, surgical, social and family histories were reviewed and updated in EPIC.    ROS:   12 point review of systems negative other than symptoms noted below.  Constitutional: Fatigue  Gastrointestinal: Diarrhea  Genitourinary: Cramps and Heavy Bleeding with Period  Neurologic: Dizziness and Headaches  Psychiatric: Anxiety, Depression, Difficulty Sleeping and Matos    EXAM:  /72   Pulse 72   Ht 1.638 m (5' 4.5\")   Wt 123.4 kg (272 lb)   LMP 10/17/2019   BMI 45.97 kg/m     BMI: Body mass index is 45.97 kg/m .    PHYSICAL EXAM:  Constitutional:   Appearance: Well nourished, well developed, alert, in no acute distress  Neck:  Lymph Nodes:  No lymphadenopathy present    Thyroid:  Gland size normal, nontender, no nodules or masses present  on palpation  Chest:  Respiratory Effort:  Breathing unlabored  Cardiovascular:    Heart: Auscultation:  Regular rate, normal rhythm, no murmurs present    Gastrointestinal:   Abdominal Examination:  Abdomen nontender to palpation, tone normal without rigidity or guarding, no masses present, umbilicus without lesions   Liver and Spleen:  No hepatomegaly present, liver nontender to palpation    Hernias:  No hernias present  Lymphatic: Lymph Nodes:  No other lymphadenopathy present  Skin:  General Inspection:  No rashes present, no lesions present, no areas of  discoloration  Neurologic:    Mental Status:  Oriented X3.  Normal strength and tone, sensory exam                grossly normal, mentation intact and speech normal.    Psychiatric:   Mentation appears normal and affect normal/bright.         Pelvic Exam:  External Genitalia:     Normal appearance for age, no discharge present, no tenderness present, no inflammatory " lesions present, color normal  Vagina:     Normal vaginal vault without central or paravaginal defects, no discharge present, no inflammatory lesions present, no masses present  Bladder:     Nontender to palpation  Urethra:   Urethral Body:  Urethra palpation normal, urethra structural support normal   Urethral Meatus:  No erythema or lesions present  Cervix:     Appearance healthy, no lesions present, nontender to palpation, no bleeding present  Uterus:     Uterus: firm, normal sized and nontender, anteverted in position.   Adnexa:     No adnexal tenderness present, no adnexal masses present  Perineum:     Perineum within normal limits, no evidence of trauma, no rashes or skin lesions present  Anus:     Anus within normal limits, no hemorrhoids present  Inguinal Lymph Nodes:     No lymphadenopathy present  Pubic Hair:     Normal pubic hair distribution for age  Genitalia and Groin:     No rashes present, no lesions present, no areas of discoloration, no masses present      COUNSELING:   Reviewed preventive health counseling, as reflected in patient instructions       Regular exercise    BMI: Body mass index is 45.97 kg/m .  Weight management plan: Patient was referred to their PCP to discuss a diet and exercise plan.    ASSESSMENT:  39 year old female with satisfactory annual exam.    ICD-10-CM    1. Encounter for gynecological examination without abnormal finding Z01.419 Pap imaged thin layer screen with HPV - recommended age 30 - 65     HPV High Risk Types DNA Cervical   2. Menorrhagia with regular cycle N92.0    3. Bipolar 2 disorder (H) F31.81    4. Morbid obesity (H) E66.01    5. Iron deficiency anemia due to chronic blood loss D50.0        PLAN:  Discussed all options.   Gave option of micronor, ablation, IUD, LASH. If has bicornuate uterus IUD and ablation not good choices. Would need ultrasound to evaluate.   If wants LASH EMB would be needed.  Pt is not interested in taking Hormone. Not interested in  "\"trying\" something. Would like to have high chance of cure.   Discussed LASH in detail. Discussed surgery in detail including morcellation within a bag. Discussed potential for bowel, bladder or blood vessel injury.   Plan outpatient with 1-2 week recovery. Pt has mesh over  scar. Will not go into that area for the surgery.   Pt has had general anesthesia and done well.   Will try to schedule this year.   Will need preop with her PCP.    30 minutes was spent face to face with the patient today discussing her history, diagnosis, and follow-up plan above that required for her annual exam         Akash Acosta MD    INDICATIONS:                                                    Is a pregnancy test required: No.  Was a consent obtained?  Verbal    Having endometrial biopsy for menorrhagia    Today's PHQ-2 Score:   PHQ-2 (  [employer]) 2019   Q1: Little interest or pleasure in doing things 0   Q2: Feeling down, depressed or hopeless 0   PHQ-2 Score 0   Q1: Little interest or pleasure in doing things -   Q2: Feeling down, depressed or hopeless -   PHQ-2 Score -       PROCEDURE;                                                      A speculum was placed in the vagina and cervix prepped with betadine. A tenaculum was attached to the cervix. A small plastic 5 mm Pipelle syringe curette was inserted into the cervical canal. The uterus was sounded to 9 cm's. A vigorous four quadrant biopsy was performed, removing amount scant  of tissue. The speculum was removed. This tissue was placed in Formalin and sent to pathology.    The patient tolerated the procedure  fairly well and she reported there was  cramping.      POST PROCEDURE;                                                      There  was no cramping at the time of discharge. She  tolerated the procedure well. There were no complications. Patient was discharged in stable condition.    Patient advised to call the clinic if severe pelvic pain, fever or heavy " bleeding.    Akash Acosta MD

## 2019-10-31 ENCOUNTER — OFFICE VISIT (OUTPATIENT)
Dept: OBGYN | Facility: CLINIC | Age: 39
End: 2019-10-31
Payer: COMMERCIAL

## 2019-10-31 VITALS
DIASTOLIC BLOOD PRESSURE: 72 MMHG | SYSTOLIC BLOOD PRESSURE: 122 MMHG | WEIGHT: 272 LBS | BODY MASS INDEX: 45.32 KG/M2 | HEART RATE: 72 BPM | HEIGHT: 65 IN

## 2019-10-31 DIAGNOSIS — F31.81 BIPOLAR 2 DISORDER (H): ICD-10-CM

## 2019-10-31 DIAGNOSIS — N92.0 MENORRHAGIA WITH REGULAR CYCLE: ICD-10-CM

## 2019-10-31 DIAGNOSIS — Z01.419 ENCOUNTER FOR GYNECOLOGICAL EXAMINATION WITHOUT ABNORMAL FINDING: Primary | ICD-10-CM

## 2019-10-31 DIAGNOSIS — E66.01 MORBID OBESITY (H): ICD-10-CM

## 2019-10-31 DIAGNOSIS — D50.0 IRON DEFICIENCY ANEMIA DUE TO CHRONIC BLOOD LOSS: ICD-10-CM

## 2019-10-31 PROCEDURE — 99213 OFFICE O/P EST LOW 20 MIN: CPT | Mod: 25 | Performed by: OBSTETRICS & GYNECOLOGY

## 2019-10-31 PROCEDURE — 58100 BIOPSY OF UTERUS LINING: CPT | Performed by: OBSTETRICS & GYNECOLOGY

## 2019-10-31 PROCEDURE — 87624 HPV HI-RISK TYP POOLED RSLT: CPT | Performed by: OBSTETRICS & GYNECOLOGY

## 2019-10-31 PROCEDURE — 99386 PREV VISIT NEW AGE 40-64: CPT | Mod: 25 | Performed by: OBSTETRICS & GYNECOLOGY

## 2019-10-31 PROCEDURE — 88305 TISSUE EXAM BY PATHOLOGIST: CPT | Performed by: OBSTETRICS & GYNECOLOGY

## 2019-10-31 PROCEDURE — G0145 SCR C/V CYTO,THINLAYER,RESCR: HCPCS | Performed by: OBSTETRICS & GYNECOLOGY

## 2019-10-31 ASSESSMENT — ANXIETY QUESTIONNAIRES
6. BECOMING EASILY ANNOYED OR IRRITABLE: NOT AT ALL
2. NOT BEING ABLE TO STOP OR CONTROL WORRYING: SEVERAL DAYS
IF YOU CHECKED OFF ANY PROBLEMS ON THIS QUESTIONNAIRE, HOW DIFFICULT HAVE THESE PROBLEMS MADE IT FOR YOU TO DO YOUR WORK, TAKE CARE OF THINGS AT HOME, OR GET ALONG WITH OTHER PEOPLE: SOMEWHAT DIFFICULT
5. BEING SO RESTLESS THAT IT IS HARD TO SIT STILL: NOT AT ALL
1. FEELING NERVOUS, ANXIOUS, OR ON EDGE: NOT AT ALL
3. WORRYING TOO MUCH ABOUT DIFFERENT THINGS: SEVERAL DAYS
GAD7 TOTAL SCORE: 2
7. FEELING AFRAID AS IF SOMETHING AWFUL MIGHT HAPPEN: NOT AT ALL

## 2019-10-31 ASSESSMENT — PATIENT HEALTH QUESTIONNAIRE - PHQ9
5. POOR APPETITE OR OVEREATING: NOT AT ALL
SUM OF ALL RESPONSES TO PHQ QUESTIONS 1-9: 2

## 2019-10-31 ASSESSMENT — MIFFLIN-ST. JEOR: SCORE: 1896.72

## 2019-10-31 NOTE — LETTER
10/31/2019        RE: Anders Zepeda  [address]  [facility] [address]          Anders is a 39 year old  female who presents for annual exam.     Besides routine health maintenance, patient has just had an iron infusion due to anemia. Patient is having very heavy and painful periods that may be the cause of this, and was told to follow-up with Gyn.    HPI:  Here to evaluate longstanding menorrhagia. Pt has monthly bleeds that are heavy. Lasting 8 days. No intermenstrual bleeding. Has chronic anemia. Gets Iron infusions due to anemia and intolerance to PO iron.   S/P two  sections and tubal ligation. According to the patient the uterus is abnormally shaped. Had incisional hernia at  section scar. Had mesh repair.   Pt is a chronic smoker of a pack per day. PMH significant for bipolar disorder.    The patient's PCP is Dr Elsa Omalley MD.      GYNECOLOGIC HISTORY:    Patient's last menstrual period was 10/17/2019.    Regular menses? No, very heavy  Menses every 26 days.  Length of menses: 8-9 days    Her current contraception method is: tubal ligation.  She  reports that she has been smoking. She has a 25.00 pack-year smoking history. She has never used smokeless tobacco.  Tobacco Cessation Action Plan: patient has been working on this  Patient is sexually active.  STD testing offered?  Declined     Last PHQ-9 score on record =   PHQ-9 SCORE 10/31/2019   PHQ-9 Total Score MyChart -   PHQ-9 Total Score 2     Last GAD7 score on record =   BRADY-7 SCORE 10/31/2019   Total Score -   Total Score 2     Alcohol Score = 1    HEALTH MAINTENANCE:  Cholesterol: followed by primary care provider  Recent Labs   Lab Test 19  1342   CHOL 145   HDL 49*   LDL 79   TRIG 83   GLUCOSE 98     Last Mammo: due for baseline  Last Pap: 16 Negative, due for co-test  Colonoscopy:  N/A, Result: not applicable, Next Colonoscopy: screen age 49  Dexa:  never  Health maintenance updated:  yes    HISTORY:  OB  History    Para Term  AB Living   5 2 2 0 3 0   SAB TAB Ectopic Multiple Live Births   3 0 0 0 0      # Outcome Date GA Lbr Levi/2nd Weight Sex Delivery Anes PTL Lv   5 Term 2000     -SEC      4 Term 1998     -SEC      3 SAB            2 SAB            1 SAB                Patient Active Problem List   Diagnosis     Morbid obesity (H)     Status post ORIF of fracture of ankle     Edema     Cough     Post-nasal drip     Nasal congestion     Sinus pressure     Medication side effects     Bipolar 2 disorder (H)     Primary insomnia     Iron deficiency anemia due to chronic blood loss     Side effect of medication     Past Surgical History:   Procedure Laterality Date     ABDOMEN SURGERY      c section     ARTHROSCOPY ANKLE, OPEN REPAIR TENDON, COMBINED Left 2019    Procedure: 1.  Ankle arthroscopy with extensive synovectomy, left lower extremity.  2.  Peroneus brevis debridement, repair, left lower extremity.  3.  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  4.  Open reduction and internal fixation, left distal fibular fracture nonunion.  5.  Stressing of left ankle under anesthesia.   ;  Surgeon: Linus Chiu DPM;  Location: [facility]      SECTION  1998      SECTION  2000     HERNIA REPAIR       OPEN REDUCTION INTERNAL FIXATION ANKLE Left 2019    Procedure: Open reduction internal fixation ankle;  Surgeon: Linus Chiu DPM;  Location: [facility]     TUBAL LIGATION        Social History     Tobacco Use     Smoking status: Current Every Day Smoker     Packs/day: 1.00     Years: 25.00     Pack years: 25.00     Smokeless tobacco: Never Used   Substance Use Topics     Alcohol use: Yes     Comment: 2 drinks per month      Problem (# of Occurrences) Relation (Name,Age of Onset)    Family History Negative (1) Father    Uterine Cancer (1) Mother            Current Outpatient Medications   Medication Sig     OLANZapine (ZYPREXA) 5 MG tablet  "Take 1 tablet (5 mg) by mouth At Bedtime     vitamin D3 (CHOLECALCIFEROL) 400 units capsule Take 1 capsule (400 Units) by mouth daily     No current facility-administered medications for this visit.      Allergies   Allergen Reactions     Erythromycin      Other reaction(s): Vomiting. PATIENT DOES NOT BELIEVE THIS IS AN ISSUE.       Past medical, surgical, social and family histories were reviewed and updated in EPIC.    ROS:   12 point review of systems negative other than symptoms noted below.  Constitutional: Fatigue  Gastrointestinal: Diarrhea  Genitourinary: Cramps and Heavy Bleeding with Period  Neurologic: Dizziness and Headaches  Psychiatric: Anxiety, Depression, Difficulty Sleeping and Matos    EXAM:  /72   Pulse 72   Ht 1.638 m (5' 4.5\")   Wt 123.4 kg (272 lb)   LMP 10/17/2019   BMI 45.97 kg/m      BMI: Body mass index is 45.97 kg/m .    PHYSICAL EXAM:  Constitutional:   Appearance: Well nourished, well developed, alert, in no acute distress  Neck:  Lymph Nodes:  No lymphadenopathy present    Thyroid:  Gland size normal, nontender, no nodules or masses present  on palpation  Chest:  Respiratory Effort:  Breathing unlabored  Cardiovascular:    Heart: Auscultation:  Regular rate, normal rhythm, no murmurs present    Gastrointestinal:   Abdominal Examination:  Abdomen nontender to palpation, tone normal without rigidity or guarding, no masses present, umbilicus without lesions   Liver and Spleen:  No hepatomegaly present, liver nontender to palpation    Hernias:  No hernias present  Lymphatic: Lymph Nodes:  No other lymphadenopathy present  Skin:  General Inspection:  No rashes present, no lesions present, no areas of  discoloration  Neurologic:    Mental Status:  Oriented X3.  Normal strength and tone, sensory exam                grossly normal, mentation intact and speech normal.    Psychiatric:   Mentation appears normal and affect normal/bright.         Pelvic Exam:  External Genitalia:  "    Normal appearance for age, no discharge present, no tenderness present, no inflammatory lesions present, color normal  Vagina:     Normal vaginal vault without central or paravaginal defects, no discharge present, no inflammatory lesions present, no masses present  Bladder:     Nontender to palpation  Urethra:   Urethral Body:  Urethra palpation normal, urethra structural support normal   Urethral Meatus:  No erythema or lesions present  Cervix:     Appearance healthy, no lesions present, nontender to palpation, no bleeding present  Uterus:     Uterus: firm, normal sized and nontender, anteverted in position.   Adnexa:     No adnexal tenderness present, no adnexal masses present  Perineum:     Perineum within normal limits, no evidence of trauma, no rashes or skin lesions present  Anus:     Anus within normal limits, no hemorrhoids present  Inguinal Lymph Nodes:     No lymphadenopathy present  Pubic Hair:     Normal pubic hair distribution for age  Genitalia and Groin:     No rashes present, no lesions present, no areas of discoloration, no masses present      COUNSELING:   Reviewed preventive health counseling, as reflected in patient instructions       Regular exercise    BMI: Body mass index is 45.97 kg/m .  Weight management plan: Patient was referred to their PCP to discuss a diet and exercise plan.    ASSESSMENT:  39 year old female with satisfactory annual exam.    ICD-10-CM    1. Encounter for gynecological examination without abnormal finding Z01.419 Pap imaged thin layer screen with HPV - recommended age 30 - 65     HPV High Risk Types DNA Cervical   2. Menorrhagia with regular cycle N92.0    3. Bipolar 2 disorder (H) F31.81    4. Morbid obesity (H) E66.01    5. Iron deficiency anemia due to chronic blood loss D50.0        PLAN:  Discussed all options.   Gave option of micronor, ablation, IUD, LASH. If has bicornuate uterus IUD and ablation not good choices. Would need ultrasound to evaluate.   If wants  "LASH EMB would be needed.  Pt is not interested in taking Hormone. Not interested in \"trying\" something. Would like to have high chance of cure.   Discussed LASH in detail. Discussed surgery in detail including morcellation within a bag. Discussed potential for bowel, bladder or blood vessel injury.   Plan outpatient with 1-2 week recovery. Pt has mesh over  scar. Will not go into that area for the surgery.   Pt has had general anesthesia and done well.   Will try to schedule this year.   Will need preop with her PCP.    30 minutes was spent face to face with the patient today discussing her history, diagnosis, and follow-up plan above that required for her annual exam         Akash Acosta MD    INDICATIONS:                                                    Is a pregnancy test required: No.  Was a consent obtained?  Verbal    Having endometrial biopsy for menorrhagia    Today's PHQ-2 Score:   PHQ-2 (  [employer]) 2019   Q1: Little interest or pleasure in doing things 0   Q2: Feeling down, depressed or hopeless 0   PHQ-2 Score 0   Q1: Little interest or pleasure in doing things -   Q2: Feeling down, depressed or hopeless -   PHQ-2 Score -       PROCEDURE;                                                      A speculum was placed in the vagina and cervix prepped with betadine. A tenaculum was attached to the cervix. A small plastic 5 mm Pipelle syringe curette was inserted into the cervical canal. The uterus was sounded to 9 cm's. A vigorous four quadrant biopsy was performed, removing amount scant  of tissue. The speculum was removed. This tissue was placed in Formalin and sent to pathology.    The patient tolerated the procedure  fairly well and she reported there was  cramping.      POST PROCEDURE;                                                      There  was no cramping at the time of discharge. She  tolerated the procedure well. There were no complications. Patient was discharged in stable " condition.    Patient advised to call the clinic if severe pelvic pain, fever or heavy bleeding.    Akash Acosta MD      Sincerely,        Akash Acosta MD

## 2019-11-01 ENCOUNTER — PREP FOR PROCEDURE (OUTPATIENT)
Dept: OBGYN | Facility: CLINIC | Age: 39
End: 2019-11-01

## 2019-11-01 ENCOUNTER — TELEPHONE (OUTPATIENT)
Dept: OBGYN | Facility: CLINIC | Age: 39
End: 2019-11-01

## 2019-11-01 ENCOUNTER — MYC MEDICAL ADVICE (OUTPATIENT)
Dept: OBGYN | Facility: CLINIC | Age: 39
End: 2019-11-01

## 2019-11-01 DIAGNOSIS — N92.0 MENORRHAGIA: Primary | ICD-10-CM

## 2019-11-01 NOTE — TELEPHONE ENCOUNTER
SENT FOR 2nd SIGN    Marsha López  Surgery Scheduler      Order Questions     Question Answer   Procedure name(s) - multi select Laparoscopic supracervical hysterectomy, bilateral salpingectomy, diagnostic cystoscopy   Is this a multi surgeon case? No   Laterality Bilateral   Reason for procedure See diagnosis   Location of Case: [facility]   Surgeon Procedure Time (incision to closure) in minutes (per procedure as applicable) 90   Note:  Surgical Case Time Needed (in minutes)   Patient Class (for admit prior to surgery, specify number of days in comments): Same day (surgery center outpatient)   Anesthesia General   H&P To Be Completed By: PCP   Post-Op Appointment 4 weeks   Procedure will be performed or supervised by: Karla   Surgical Assistant Angelica   Vendor Needed? No

## 2019-11-02 ASSESSMENT — ANXIETY QUESTIONNAIRES: GAD7 TOTAL SCORE: 2

## 2019-11-04 PROBLEM — N92.0 MENORRHAGIA: Status: ACTIVE | Noted: 2019-11-04

## 2019-11-04 LAB — COPATH REPORT: NORMAL

## 2019-11-04 NOTE — TELEPHONE ENCOUNTER
Type of surgery: ALBINO DAMON DIAG CYSTO  Location of surgery: [facility]  Date and time of surgery: 11/13/2019 8:45a   Surgeon: Karla monreal/Chinedu to assist  Pre-Op Appt Date: PRIMARY   Post-Op Appt Date: TBD   Packet sent out: MAILED 11/4/2019  Pre-cert/Authorization completed:  TBD  Date: 11/4/2019 Kamari Galindo and carolyn Santiago to add Chinedu as assist    Marsha López  Surgery Scheduler    DX N92.0  CPT 65007

## 2019-11-05 LAB
COPATH REPORT: NORMAL
PAP: NORMAL

## 2019-11-07 ENCOUNTER — OFFICE VISIT (OUTPATIENT)
Dept: FAMILY MEDICINE | Facility: CLINIC | Age: 39
End: 2019-11-07
Payer: COMMERCIAL

## 2019-11-07 VITALS
WEIGHT: 273 LBS | HEART RATE: 69 BPM | OXYGEN SATURATION: 99 % | DIASTOLIC BLOOD PRESSURE: 87 MMHG | TEMPERATURE: 98.7 F | BODY MASS INDEX: 46.14 KG/M2 | SYSTOLIC BLOOD PRESSURE: 135 MMHG

## 2019-11-07 DIAGNOSIS — N92.1 MENORRHAGIA WITH IRREGULAR CYCLE: ICD-10-CM

## 2019-11-07 DIAGNOSIS — E66.01 MORBID OBESITY (H): ICD-10-CM

## 2019-11-07 DIAGNOSIS — D50.0 IRON DEFICIENCY ANEMIA DUE TO CHRONIC BLOOD LOSS: ICD-10-CM

## 2019-11-07 DIAGNOSIS — Z01.818 PREOP GENERAL PHYSICAL EXAM: Primary | ICD-10-CM

## 2019-11-07 DIAGNOSIS — F31.81 BIPOLAR 2 DISORDER (H): ICD-10-CM

## 2019-11-07 LAB
FINAL DIAGNOSIS: NORMAL
HGB BLD-MCNC: 13.9 G/DL (ref 11.7–15.7)
HPV HR 12 DNA CVX QL NAA+PROBE: NEGATIVE
HPV16 DNA SPEC QL NAA+PROBE: NEGATIVE
HPV18 DNA SPEC QL NAA+PROBE: NEGATIVE
SPECIMEN DESCRIPTION: NORMAL
SPECIMEN SOURCE CVX/VAG CYTO: NORMAL

## 2019-11-07 PROCEDURE — 90686 IIV4 VACC NO PRSV 0.5 ML IM: CPT | Performed by: INTERNAL MEDICINE

## 2019-11-07 PROCEDURE — 85018 HEMOGLOBIN: CPT | Performed by: INTERNAL MEDICINE

## 2019-11-07 PROCEDURE — 90471 IMMUNIZATION ADMIN: CPT | Performed by: INTERNAL MEDICINE

## 2019-11-07 PROCEDURE — 99215 OFFICE O/P EST HI 40 MIN: CPT | Mod: 25 | Performed by: INTERNAL MEDICINE

## 2019-11-07 PROCEDURE — 36415 COLL VENOUS BLD VENIPUNCTURE: CPT | Performed by: INTERNAL MEDICINE

## 2019-11-07 NOTE — RESULT ENCOUNTER NOTE
The following letter pertains to your most recent diagnostic tests:    Good news!  Hemoglobin is normal.  You may safely proceed with your planned hysterectomy.        Sincerely,    Dr. Matos

## 2019-11-07 NOTE — PROGRESS NOTES
[facility]  [address] [place] [id number]  [phone]  Dept: [phone]    PRE-OP EVALUATION:  Today's date: 2019    Anders Zepeda (: 1980) presents for pre-operative evaluation assessment as requested by Dr. Acosta, Akash Rivera MD.  She requires evaluation and anesthesia risk assessment prior to undergoing surgery/procedure for treatment of LAPAROSCOPIC SUPRACERVICAL HYSTERECTOMY, LAPAROSCOPIC BILATERAL SALPINGECTOMY, and DIAGNOSTIC CYSTOSCOPY   .    Proposed Surgery/ Procedure: LAPAROSCOPIC SUPRACERVICAL HYSTERECTOMY, LAPAROSCOPIC BILATERAL SALPINGECTOMY, and DIAGNOSTIC CYSTOSCOPY   .  Date of Surgery/ Procedure: 2019  Time of Surgery/ Procedure: 8:30 AM  Hospital/Surgical Facility:  OR  Fax number for surgical facility: [facility]  Primary Physician: Elsa Omalley  Type of Anesthesia Anticipated: General    Patient has a Health Care Directive or Living Will:  NO    1. NO - Do you have a history of heart attack, stroke, stent, bypass or surgery on an artery in the head, neck, heart or legs?  2. NO - Do you ever have any pain or discomfort in your chest?  3. NO - Do you have a history of  Heart Failure?  4. NO - Are you troubled by shortness of breath when: walking on the level, up a slight hill or at night?  5. NO - Do you currently have a cold, bronchitis or other respiratory infection?  6. NO - Do you have a cough, shortness of breath or wheezing?  7. NO - Do you sometimes get pains in the calves of your legs when you walk?  8. NO - Do you or anyone in your family have previous history of blood clots?  9. NO - Do you or does anyone in your family have a serious bleeding problem such as prolonged bleeding following surgeries or cuts?  10. YES - Have you ever had problems with anemia or been told to take iron pills?  11. NO - Have you had any abnormal blood loss such as black, tarry or bloody stools, or abnormal vaginal bleeding?  12. NO - Have you ever had a  blood transfusion?  13. NO - Have you or any of your relatives ever had problems with anesthesia?  14. NO - Do you have sleep apnea, excessive snoring or daytime drowsiness?  15. NO - Do you have any prosthetic heart valves?  16. NO - Do you have prosthetic joints?  17. NO - Is there any chance that you may be pregnant?      HPI:     HPI related to upcoming procedure: Iron def anemia from heavy menstrual blood loss for many months.  Had iron infusion and feels better.  Can perform 4 METS of physical activity without chest pain or dyspnea.       MEDICAL HISTORY:     Patient Active Problem List    Diagnosis Date Noted     Menorrhagia 11/04/2019     Priority: Medium     Added automatically from request for surgery [id number]       Iron deficiency anemia due to chronic blood loss 10/01/2019     Priority: Medium     Side effect of medication 10/01/2019     Priority: Medium     Primary insomnia 08/08/2019     Priority: Medium     Sinus pressure 06/28/2019     Priority: Medium     Medication side effects 06/28/2019     Priority: Medium     Bipolar 2 disorder (H) 06/28/2019     Priority: Medium     Cough 06/23/2019     Priority: Medium     Post-nasal drip 06/23/2019     Priority: Medium     Nasal congestion 06/23/2019     Priority: Medium     Status post ORIF of fracture of ankle 06/12/2019     Priority: Medium     Edema 06/12/2019     Priority: Medium     Morbid obesity (H) 02/22/2019     Priority: Medium      Past Medical History:   Diagnosis Date     Ankle sprain      Anxiety      Bipolar 1 disorder (H)      Depression      Past Surgical History:   Procedure Laterality Date     ABDOMEN SURGERY      c section     ARTHROSCOPY ANKLE, OPEN REPAIR TENDON, COMBINED Left 4/17/2019    Procedure: 1.  Ankle arthroscopy with extensive synovectomy, left lower extremity.  2.  Peroneus brevis debridement, repair, left lower extremity.  3.  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  4.  Open reduction and internal fixation, left  distal fibular fracture nonunion.  5.  Stressing of left ankle under anesthesia.   ;  Surgeon: Linus Chiu DPM;  Location: [facility] OR      SECTION  1998      SECTION  2000     HERNIA REPAIR       OPEN REDUCTION INTERNAL FIXATION ANKLE Left 2019    Procedure: Open reduction internal fixation ankle;  Surgeon: Linus Chiu DPM;  Location: [facility] OR     TUBAL LIGATION       Current Outpatient Medications   Medication Sig Dispense Refill     OLANZapine (ZYPREXA) 5 MG tablet Take 1 tablet (5 mg) by mouth At Bedtime 30 tablet 1     vitamin D3 (CHOLECALCIFEROL) 400 units capsule Take 1 capsule (400 Units) by mouth daily         Allergies   Allergen Reactions     Erythromycin      Other reaction(s): Vomiting. PATIENT DOES NOT BELIEVE THIS IS AN ISSUE.        Social History     Tobacco Use     Smoking status: Current Every Day Smoker     Packs/day: 1.00     Years: 25.00     Pack years: 25.00     Smokeless tobacco: Never Used   Substance Use Topics     Alcohol use: Yes     Comment: 2 drinks per month     History   Drug Use Unknown       REVIEW OF SYSTEMS:   Constitutional, neuro, ENT, endocrine, pulmonary, cardiac, gastrointestinal, genitourinary, musculoskeletal, integument and psychiatric systems are negative, except as otherwise noted.    EXAM:   /87 (BP Location: Left arm, Cuff Size: Adult Regular)   Pulse 69   Temp 98.7  F (37.1  C) (Tympanic)   Wt 123.8 kg (273 lb)   LMP 10/17/2019   SpO2 99%   BMI 46.14 kg/m      GENERAL APPEARANCE: healthy, alert and no distress     EYES: EOMI, PERRL     HENT: ear canals and TM's normal and nose and mouth without ulcers or lesions     NECK: no adenopathy, no asymmetry, masses, or scars and thyroid normal to palpation     RESP: lungs clear to auscultation - no rales, rhonchi or wheezes     CV: regular rates and rhythm, normal S1 S2, no S3 or S4 and no murmur, click or rub     ABDOMEN:  Obese, soft, nontender, no HSM or masses and bowel  sounds normal     MS: extremities normal- no gross deformities noted, no evidence of inflammation in joints, FROM in all extremities.     SKIN: no suspicious lesions or rashes     NEURO: Normal strength and tone, sensory exam grossly normal, mentation intact and speech normal     PSYCH: mentation appears normal. and affect normal/bright     LYMPHATICS: No cervical adenopathy    DIAGNOSTICS:   Hemoglobin pending     Recent Labs   Lab Test 10/01/19  0922 08/09/19  1342   HGB 9.9* 9.8*    312   NA  --  138   POTASSIUM  --  4.2   CR  --  0.68        IMPRESSION:   Reason for surgery/procedure: Menorrhagia   Diagnosis/reason for consult: Preoperative evaluation     The proposed surgical procedure is considered INTERMEDIATE risk.    REVISED CARDIAC RISK INDEX  The patient has the following serious cardiovascular risks for perioperative complications such as (MI, PE, VFib and 3  AV Block):  No serious cardiac risks  INTERPRETATION: 1 risks: Class II (low risk - 0.9% complication rate)    The patient has the following additional risks for perioperative complications:  No identified additional risks  Morbid obesity      ICD-10-CM    1. Preop general physical exam Z01.818    2. Menorrhagia with irregular cycle N92.1    3. Bipolar 2 disorder (H) F31.81    4. Morbid obesity (H) E66.01    5. Iron deficiency anemia due to chronic blood loss D50.0      Check hemoglobin after iron infusion  Mood stable  Encouraged exercise and calorie reduction to promote weight loss  Counseled on tobacco cessation strategies, she may choose a quit date after recovery from hysterectomy     RECOMMENDATIONS:           APPROVAL GIVEN to proceed with proposed procedure, without further diagnostic evaluation; assuming hemoglobin is improved following iron infusion        Signed Electronically by: Gilbert Matos MD     Copy of this evaluation report is provided to requesting physician.    [facility] Preop Guidelines    Revised Cardiac Risk Index

## 2019-11-11 NOTE — TELEPHONE ENCOUNTER
Pt MARBINM stating [facility] requires a PA  Per Karen this has been handled Auth # [id number]    Called pt and she advised that yes she did get a letter this weekend from [facility] stating the same    Marsha López  Surgery Scheduler

## 2019-11-13 ENCOUNTER — ANESTHESIA (OUTPATIENT)
Dept: SURGERY | Facility: CLINIC | Age: 39
End: 2019-11-13
Payer: COMMERCIAL

## 2019-11-13 ENCOUNTER — ANESTHESIA EVENT (OUTPATIENT)
Dept: SURGERY | Facility: CLINIC | Age: 39
End: 2019-11-13
Payer: COMMERCIAL

## 2019-11-13 ENCOUNTER — HOSPITAL ENCOUNTER (OUTPATIENT)
Facility: CLINIC | Age: 39
Discharge: HOME | End: 2019-11-13
Attending: OBSTETRICS & GYNECOLOGY | Admitting: OBSTETRICS & GYNECOLOGY
Payer: COMMERCIAL

## 2019-11-13 VITALS
TEMPERATURE: 97.7 F | SYSTOLIC BLOOD PRESSURE: 114 MMHG | HEIGHT: 65 IN | OXYGEN SATURATION: 95 % | WEIGHT: 269.8 LBS | HEART RATE: 59 BPM | RESPIRATION RATE: 16 BRPM | BODY MASS INDEX: 44.95 KG/M2 | DIASTOLIC BLOOD PRESSURE: 78 MMHG

## 2019-11-13 DIAGNOSIS — G89.18 ACUTE POSTOPERATIVE PAIN: Primary | ICD-10-CM

## 2019-11-13 DIAGNOSIS — N92.0 MENORRHAGIA: ICD-10-CM

## 2019-11-13 LAB — B-HCG SERPL-ACNC: <1 IU/L (ref 0–5)

## 2019-11-13 PROCEDURE — 25000125 ZZHC RX 250: Performed by: NURSE ANESTHETIST, CERTIFIED REGISTERED

## 2019-11-13 PROCEDURE — 25000566 ZZH SEVOFLURANE, EA 15 MIN: Performed by: OBSTETRICS & GYNECOLOGY

## 2019-11-13 PROCEDURE — 25000128 H RX IP 250 OP 636: Performed by: OBSTETRICS & GYNECOLOGY

## 2019-11-13 PROCEDURE — 58542 LSH W/T/O UT 250 G OR LESS: CPT | Mod: 80 | Performed by: OBSTETRICS & GYNECOLOGY

## 2019-11-13 PROCEDURE — 71000012 ZZH RECOVERY PHASE 1 LEVEL 1 FIRST HR: Performed by: OBSTETRICS & GYNECOLOGY

## 2019-11-13 PROCEDURE — 36415 COLL VENOUS BLD VENIPUNCTURE: CPT | Performed by: OBSTETRICS & GYNECOLOGY

## 2019-11-13 PROCEDURE — 71000013 ZZH RECOVERY PHASE 1 LEVEL 1 EA ADDTL HR: Performed by: OBSTETRICS & GYNECOLOGY

## 2019-11-13 PROCEDURE — 58542 LSH W/T/O UT 250 G OR LESS: CPT | Performed by: OBSTETRICS & GYNECOLOGY

## 2019-11-13 PROCEDURE — 40000170 ZZH STATISTIC PRE-PROCEDURE ASSESSMENT II: Performed by: OBSTETRICS & GYNECOLOGY

## 2019-11-13 PROCEDURE — 25000125 ZZHC RX 250: Performed by: OBSTETRICS & GYNECOLOGY

## 2019-11-13 PROCEDURE — 36000093 ZZH SURGERY LEVEL 4 1ST 30 MIN: Performed by: OBSTETRICS & GYNECOLOGY

## 2019-11-13 PROCEDURE — 36000063 ZZH SURGERY LEVEL 4 EA 15 ADDTL MIN: Performed by: OBSTETRICS & GYNECOLOGY

## 2019-11-13 PROCEDURE — 84702 CHORIONIC GONADOTROPIN TEST: CPT | Performed by: OBSTETRICS & GYNECOLOGY

## 2019-11-13 PROCEDURE — 25800030 ZZH RX IP 258 OP 636: Performed by: OBSTETRICS & GYNECOLOGY

## 2019-11-13 PROCEDURE — 25000132 ZZH RX MED GY IP 250 OP 250 PS 637: Performed by: OBSTETRICS & GYNECOLOGY

## 2019-11-13 PROCEDURE — 88307 TISSUE EXAM BY PATHOLOGIST: CPT | Mod: 26 | Performed by: OBSTETRICS & GYNECOLOGY

## 2019-11-13 PROCEDURE — 71000027 ZZH RECOVERY PHASE 2 EACH 15 MINS: Performed by: OBSTETRICS & GYNECOLOGY

## 2019-11-13 PROCEDURE — 25800030 ZZH RX IP 258 OP 636: Performed by: ANESTHESIOLOGY

## 2019-11-13 PROCEDURE — 25000128 H RX IP 250 OP 636: Performed by: ANESTHESIOLOGY

## 2019-11-13 PROCEDURE — 25000128 H RX IP 250 OP 636: Performed by: NURSE ANESTHETIST, CERTIFIED REGISTERED

## 2019-11-13 PROCEDURE — 37000009 ZZH ANESTHESIA TECHNICAL FEE, EACH ADDTL 15 MIN: Performed by: OBSTETRICS & GYNECOLOGY

## 2019-11-13 PROCEDURE — 25800025 ZZH RX 258: Performed by: OBSTETRICS & GYNECOLOGY

## 2019-11-13 PROCEDURE — 27210794 ZZH OR GENERAL SUPPLY STERILE: Performed by: OBSTETRICS & GYNECOLOGY

## 2019-11-13 PROCEDURE — 25800030 ZZH RX IP 258 OP 636: Performed by: NURSE ANESTHETIST, CERTIFIED REGISTERED

## 2019-11-13 PROCEDURE — 88307 TISSUE EXAM BY PATHOLOGIST: CPT | Performed by: OBSTETRICS & GYNECOLOGY

## 2019-11-13 PROCEDURE — 25000132 ZZH RX MED GY IP 250 OP 250 PS 637: Performed by: ANESTHESIOLOGY

## 2019-11-13 PROCEDURE — 37000008 ZZH ANESTHESIA TECHNICAL FEE, 1ST 30 MIN: Performed by: OBSTETRICS & GYNECOLOGY

## 2019-11-13 RX ORDER — PHENAZOPYRIDINE HYDROCHLORIDE 200 MG/1
200 TABLET, FILM COATED ORAL ONCE
Status: COMPLETED | OUTPATIENT
Start: 2019-11-13 | End: 2019-11-13

## 2019-11-13 RX ORDER — GLYCOPYRROLATE 0.2 MG/ML
INJECTION, SOLUTION INTRAMUSCULAR; INTRAVENOUS PRN
Status: DISCONTINUED | OUTPATIENT
Start: 2019-11-13 | End: 2019-11-13

## 2019-11-13 RX ORDER — PROPOFOL 10 MG/ML
INJECTION, EMULSION INTRAVENOUS PRN
Status: DISCONTINUED | OUTPATIENT
Start: 2019-11-13 | End: 2019-11-13

## 2019-11-13 RX ORDER — PROPOFOL 10 MG/ML
INJECTION, EMULSION INTRAVENOUS CONTINUOUS PRN
Status: DISCONTINUED | OUTPATIENT
Start: 2019-11-13 | End: 2019-11-13

## 2019-11-13 RX ORDER — ONDANSETRON 4 MG/1
4 TABLET, ORALLY DISINTEGRATING ORAL EVERY 30 MIN PRN
Status: DISCONTINUED | OUTPATIENT
Start: 2019-11-13 | End: 2019-11-13 | Stop reason: HOSPADM

## 2019-11-13 RX ORDER — FENTANYL CITRATE 50 UG/ML
INJECTION, SOLUTION INTRAMUSCULAR; INTRAVENOUS PRN
Status: DISCONTINUED | OUTPATIENT
Start: 2019-11-13 | End: 2019-11-13

## 2019-11-13 RX ORDER — NALOXONE HYDROCHLORIDE 0.4 MG/ML
.1-.4 INJECTION, SOLUTION INTRAMUSCULAR; INTRAVENOUS; SUBCUTANEOUS
Status: DISCONTINUED | OUTPATIENT
Start: 2019-11-13 | End: 2019-11-13 | Stop reason: HOSPADM

## 2019-11-13 RX ORDER — OXYCODONE HYDROCHLORIDE 5 MG/1
5 TABLET ORAL
Status: DISCONTINUED | OUTPATIENT
Start: 2019-11-13 | End: 2019-11-13 | Stop reason: HOSPADM

## 2019-11-13 RX ORDER — HYDROMORPHONE HYDROCHLORIDE 1 MG/ML
.3-.5 INJECTION, SOLUTION INTRAMUSCULAR; INTRAVENOUS; SUBCUTANEOUS EVERY 10 MIN PRN
Status: DISCONTINUED | OUTPATIENT
Start: 2019-11-13 | End: 2019-11-13 | Stop reason: HOSPADM

## 2019-11-13 RX ORDER — SODIUM CHLORIDE, SODIUM LACTATE, POTASSIUM CHLORIDE, CALCIUM CHLORIDE 600; 310; 30; 20 MG/100ML; MG/100ML; MG/100ML; MG/100ML
INJECTION, SOLUTION INTRAVENOUS CONTINUOUS PRN
Status: DISCONTINUED | OUTPATIENT
Start: 2019-11-13 | End: 2019-11-13

## 2019-11-13 RX ORDER — OXYCODONE HYDROCHLORIDE 5 MG/1
5 TABLET ORAL
Status: COMPLETED | OUTPATIENT
Start: 2019-11-13 | End: 2019-11-13

## 2019-11-13 RX ORDER — FENTANYL CITRATE 0.05 MG/ML
25-50 INJECTION, SOLUTION INTRAMUSCULAR; INTRAVENOUS EVERY 5 MIN PRN
Status: DISCONTINUED | OUTPATIENT
Start: 2019-11-13 | End: 2019-11-13 | Stop reason: HOSPADM

## 2019-11-13 RX ORDER — CEFAZOLIN SODIUM 1 G/3ML
1 INJECTION, POWDER, FOR SOLUTION INTRAMUSCULAR; INTRAVENOUS SEE ADMIN INSTRUCTIONS
Status: DISCONTINUED | OUTPATIENT
Start: 2019-11-13 | End: 2019-11-13 | Stop reason: HOSPADM

## 2019-11-13 RX ORDER — DEXAMETHASONE SODIUM PHOSPHATE 4 MG/ML
INJECTION, SOLUTION INTRA-ARTICULAR; INTRALESIONAL; INTRAMUSCULAR; INTRAVENOUS; SOFT TISSUE PRN
Status: DISCONTINUED | OUTPATIENT
Start: 2019-11-13 | End: 2019-11-13

## 2019-11-13 RX ORDER — ONDANSETRON 2 MG/ML
INJECTION INTRAMUSCULAR; INTRAVENOUS PRN
Status: DISCONTINUED | OUTPATIENT
Start: 2019-11-13 | End: 2019-11-13

## 2019-11-13 RX ORDER — MAGNESIUM HYDROXIDE 1200 MG/15ML
LIQUID ORAL PRN
Status: DISCONTINUED | OUTPATIENT
Start: 2019-11-13 | End: 2019-11-13 | Stop reason: HOSPADM

## 2019-11-13 RX ORDER — SODIUM CHLORIDE, SODIUM LACTATE, POTASSIUM CHLORIDE, CALCIUM CHLORIDE 600; 310; 30; 20 MG/100ML; MG/100ML; MG/100ML; MG/100ML
INJECTION, SOLUTION INTRAVENOUS CONTINUOUS
Status: DISCONTINUED | OUTPATIENT
Start: 2019-11-13 | End: 2019-11-13 | Stop reason: HOSPADM

## 2019-11-13 RX ORDER — ONDANSETRON 2 MG/ML
4 INJECTION INTRAMUSCULAR; INTRAVENOUS EVERY 30 MIN PRN
Status: DISCONTINUED | OUTPATIENT
Start: 2019-11-13 | End: 2019-11-13 | Stop reason: HOSPADM

## 2019-11-13 RX ORDER — LIDOCAINE HYDROCHLORIDE 20 MG/ML
INJECTION, SOLUTION INFILTRATION; PERINEURAL PRN
Status: DISCONTINUED | OUTPATIENT
Start: 2019-11-13 | End: 2019-11-13

## 2019-11-13 RX ORDER — ACETAMINOPHEN 325 MG/1
975 TABLET ORAL ONCE
Status: COMPLETED | OUTPATIENT
Start: 2019-11-13 | End: 2019-11-13

## 2019-11-13 RX ORDER — KETOROLAC TROMETHAMINE 30 MG/ML
INJECTION, SOLUTION INTRAMUSCULAR; INTRAVENOUS PRN
Status: DISCONTINUED | OUTPATIENT
Start: 2019-11-13 | End: 2019-11-13

## 2019-11-13 RX ORDER — CEFAZOLIN SODIUM IN 0.9 % NACL 3 G/100 ML
3 INTRAVENOUS SOLUTION, PIGGYBACK (ML) INTRAVENOUS
Status: COMPLETED | OUTPATIENT
Start: 2019-11-13 | End: 2019-11-13

## 2019-11-13 RX ORDER — OXYCODONE HYDROCHLORIDE 5 MG/1
5-10 TABLET ORAL EVERY 4 HOURS PRN
Qty: 12 TABLET | Refills: 0 | Status: SHIPPED | OUTPATIENT
Start: 2019-11-13 | End: 2020-01-07

## 2019-11-13 RX ADMIN — SUCCINYLCHOLINE CHLORIDE 180 MG: 20 INJECTION, SOLUTION INTRAMUSCULAR; INTRAVENOUS; PARENTERAL at 08:53

## 2019-11-13 RX ADMIN — ACETAMINOPHEN 975 MG: 325 TABLET, FILM COATED ORAL at 07:18

## 2019-11-13 RX ADMIN — PHENAZOPYRIDINE 200 MG: 200 TABLET ORAL at 07:18

## 2019-11-13 RX ADMIN — HYDROMORPHONE HYDROCHLORIDE 0.5 MG: 1 INJECTION, SOLUTION INTRAMUSCULAR; INTRAVENOUS; SUBCUTANEOUS at 09:20

## 2019-11-13 RX ADMIN — FENTANYL CITRATE 25 MCG: 50 INJECTION, SOLUTION INTRAMUSCULAR; INTRAVENOUS at 09:56

## 2019-11-13 RX ADMIN — PROPOFOL 30 MCG/KG/MIN: 10 INJECTION, EMULSION INTRAVENOUS at 08:52

## 2019-11-13 RX ADMIN — FENTANYL CITRATE 25 MCG: 50 INJECTION, SOLUTION INTRAMUSCULAR; INTRAVENOUS at 10:07

## 2019-11-13 RX ADMIN — FENTANYL CITRATE 50 MCG: 0.05 INJECTION, SOLUTION INTRAMUSCULAR; INTRAVENOUS at 11:02

## 2019-11-13 RX ADMIN — LIDOCAINE HYDROCHLORIDE 100 MG: 20 INJECTION, SOLUTION INFILTRATION; PERINEURAL at 08:52

## 2019-11-13 RX ADMIN — OXYCODONE HYDROCHLORIDE 5 MG: 5 TABLET ORAL at 11:38

## 2019-11-13 RX ADMIN — GLYCOPYRROLATE 0.2 MG: 0.2 INJECTION, SOLUTION INTRAMUSCULAR; INTRAVENOUS at 09:15

## 2019-11-13 RX ADMIN — FENTANYL CITRATE 25 MCG: 50 INJECTION, SOLUTION INTRAMUSCULAR; INTRAVENOUS at 09:50

## 2019-11-13 RX ADMIN — FENTANYL CITRATE 50 MCG: 50 INJECTION, SOLUTION INTRAMUSCULAR; INTRAVENOUS at 08:52

## 2019-11-13 RX ADMIN — PROPOFOL 200 MG: 10 INJECTION, EMULSION INTRAVENOUS at 08:52

## 2019-11-13 RX ADMIN — FENTANYL CITRATE 50 MCG: 50 INJECTION, SOLUTION INTRAMUSCULAR; INTRAVENOUS at 09:11

## 2019-11-13 RX ADMIN — SUGAMMADEX 200 MG: 100 INJECTION, SOLUTION INTRAVENOUS at 10:31

## 2019-11-13 RX ADMIN — DEXAMETHASONE SODIUM PHOSPHATE 4 MG: 4 INJECTION, SOLUTION INTRA-ARTICULAR; INTRALESIONAL; INTRAMUSCULAR; INTRAVENOUS; SOFT TISSUE at 09:05

## 2019-11-13 RX ADMIN — PROPOFOL 40 MG: 10 INJECTION, EMULSION INTRAVENOUS at 09:33

## 2019-11-13 RX ADMIN — FENTANYL CITRATE 25 MCG: 50 INJECTION, SOLUTION INTRAMUSCULAR; INTRAVENOUS at 09:47

## 2019-11-13 RX ADMIN — HYDROMORPHONE HYDROCHLORIDE 0.5 MG: 1 INJECTION, SOLUTION INTRAMUSCULAR; INTRAVENOUS; SUBCUTANEOUS at 11:16

## 2019-11-13 RX ADMIN — MIDAZOLAM 2 MG: 1 INJECTION INTRAMUSCULAR; INTRAVENOUS at 08:49

## 2019-11-13 RX ADMIN — PHENYLEPHRINE HYDROCHLORIDE 100 MCG: 10 INJECTION INTRAVENOUS at 09:39

## 2019-11-13 RX ADMIN — ROCURONIUM BROMIDE 50 MG: 10 INJECTION INTRAVENOUS at 09:00

## 2019-11-13 RX ADMIN — ROCURONIUM BROMIDE 20 MG: 10 INJECTION INTRAVENOUS at 10:07

## 2019-11-13 RX ADMIN — SODIUM CHLORIDE, POTASSIUM CHLORIDE, SODIUM LACTATE AND CALCIUM CHLORIDE: 600; 310; 30; 20 INJECTION, SOLUTION INTRAVENOUS at 08:46

## 2019-11-13 RX ADMIN — Medication 2 G: at 09:01

## 2019-11-13 RX ADMIN — PHENYLEPHRINE HYDROCHLORIDE 100 MCG: 10 INJECTION INTRAVENOUS at 09:36

## 2019-11-13 RX ADMIN — KETOROLAC TROMETHAMINE 30 MG: 30 INJECTION, SOLUTION INTRAMUSCULAR at 10:21

## 2019-11-13 RX ADMIN — SODIUM CHLORIDE, POTASSIUM CHLORIDE, SODIUM LACTATE AND CALCIUM CHLORIDE: 600; 310; 30; 20 INJECTION, SOLUTION INTRAVENOUS at 11:37

## 2019-11-13 RX ADMIN — ONDANSETRON 4 MG: 2 INJECTION INTRAMUSCULAR; INTRAVENOUS at 10:17

## 2019-11-13 ASSESSMENT — LIFESTYLE VARIABLES: TOBACCO_USE: 0

## 2019-11-13 ASSESSMENT — MIFFLIN-ST. JEOR: SCORE: 1886.74

## 2019-11-13 ASSESSMENT — COPD QUESTIONNAIRES: COPD: 0

## 2019-11-13 NOTE — ANESTHESIA POSTPROCEDURE EVALUATION
Patient: Anders Renlund    Procedure(s):  LAPAROSCOPIC SUPRACERVICAL HYSTERECTOMY  LAPAROSCOPIC BILATERAL SALPINGECTOMY  DIAGNOSTIC CYSTOSCOPY    Diagnosis:Menorrhagia [N92.0]  Diagnosis Additional Information: No value filed.    Anesthesia Type:  General, ETT    Note:  Anesthesia Post Evaluation    Patient location during evaluation: PACU  Patient participation: Able to fully participate in evaluation  Level of consciousness: awake and alert  Pain management: adequate  Airway patency: patent  Cardiovascular status: acceptable  Respiratory status: acceptable  Hydration status: acceptable  PONV: none     Anesthetic complications: None          Last vitals:  Vitals:    11/13/19 1136 11/13/19 1145 11/13/19 1240   BP: 109/88 110/78 114/78   Pulse: 61 59    Resp: 21 28 16   Temp: 36.5  C (97.7  F) 36.5  C (97.7  F)    SpO2: 97%  95%         Electronically Signed By: Thong Bragg MD  November 13, 2019  2:05 PM

## 2019-11-13 NOTE — PROGRESS NOTES
[facility]  Gynecology Brief Operative Note    Pre-operative diagnosis: Menorrhagia [N92.0]   Post-operative diagnosis same   Procedure: Procedure(s):  LAPAROSCOPIC SUPRACERVICAL HYSTERECTOMY  LAPAROSCOPIC BILATERAL SALPINGECTOMY  DIAGNOSTIC CYSTOSCOPY   Surgeon: Akash Acosta MD   Assistants(s): Edie Che MD   Anesthesia: General    Estimated blood loss: 20            Total urine output:    Drains: Schwarz catheter   Specimens: Uterus and tubes   Implants: None   Findings: Wide uterus with normal ovaries. S/P bilateral tubal ligation. Normal upper abdm   Complications: None   Condition: Stable   Comments: See dictated operative report for full details

## 2019-11-13 NOTE — DISCHARGE INSTRUCTIONS
Today you were given 975 mg of Tylenol at 0720. The recommended daily maximum dose is 4000 mg.     While you were at the hospital today you were given a medication called Pyridium.  This is used to treat pain, burning, increased urination, and increased urge to urinate.  Pyridium will most likely darken the color of your urine to an orange or red color. Darkened urine may also cause stains to your underwear, which may or may not be removed by laundering.  Today you received Toradol, an antiinflammatory medication similar to Ibuprofen.  You should not take other antiinflammatory medication, such as Ibuprofen, Motrin, Advil, Aleve, Naprosyn, etc until 2:30.     Same Day Surgery Discharge Instructions for  Sedation and General Anesthesia       It's not unusual to feel dizzy, light-headed or faint for up to 24 hours after surgery or while taking pain medication.  If you have these symptoms: sit for a few minutes before standing and have someone assist you when you get up to walk or use the bathroom.      You should rest and relax for the next 24 hours. We recommend you make arrangements to have an adult stay with you for at least 24 hours after your discharge.  Avoid hazardous and strenuous activity.      DO NOT DRIVE any vehicle or operate mechanical equipment for 24 hours following the end of your surgery.  Even though you may feel normal, your reactions may be affected by the medication you have received.      Do not drink alcoholic beverages for 24 hours following surgery.       Slowly progress to your regular diet as you feel able. It's not unusual to feel nauseated and/or vomit after receiving anesthesia.  If you develop these symptoms, drink clear liquids (apple juice, ginger ale, broth, 7-up, etc. ) until you feel better.  If your nausea and vomiting persists for 24 hours, please notify your surgeon.        All narcotic pain medications, along with inactivity and anesthesia, can cause constipation. Drinking  plenty of liquids and increasing fiber intake will help.      For any questions of a medical nature, call your surgeon.      Do not make important decisions for 24 hours.      If you had general anesthesia, you may have a sore throat for a couple of days related to the breathing tube used during surgery.  You may use Cepacol lozenges to help with this discomfort.  If it worsens or if you develop a fever, contact your surgeon.       If you feel your pain is not well managed with the pain medications prescribed by your surgeon, please contact your surgeon's office to let them know so they can address your concerns.       DISCHARGE INSTRUCTIONS FOLLOWING LAPAROSCOPY    ACTIVITY:  You may resume normal activities including lifting as your abdominal discomfort disappears.  You may return to work after two days if you feel well enough.  Possible abdominal and shoulder discomfort due to gas remaining in the abdomen should be gone within 48 hours.  It is permissible  to climb stairs. Showers are perfectly acceptable. You may drive a car as long as you are not taking narcotic pain pills    CHECK-UP:  You should be seen one month after discharge unless home instruction sheet states otherwise and please phone the office the day after discharge and schedule an appointment with your physician.    VAGINAL DISCHARGE:  You may have some vaginal bleeding or discharge for about a week after discharge.  You should avoid douches, tampons, and intercourse for the first week.    TEMPERATURE:  If you develop temperature levels to over 100.4 F your physician should be called immediately.    STITCHES:  There is usually a stitch under the skin incisions which will dissolve and do not need to be removed.  The bandaids may be removed at any time.    DIET:  [place] or light diet is advisable the day of surgery.  If nausea persists, continue this diet.  If severe, call.    Please call the office for increasingly severe abdominal pain or vaginal  bleeding in excess of one pad per hour.  This will rarely be a problem.    [facility]   [phone]        **If you have questions or concerns about your procedure,   call Dr. Acosta at  [phone]**

## 2019-11-13 NOTE — OR NURSING
1257hr - Pt discharged home now; pt transferred to family car in w/chair by Volunteer, no needs/concerns voiced by pt at time of discharge.

## 2019-11-13 NOTE — OP NOTE
Procedure Date: 2019      PROCEDURES:  Laparoscopic supracervical hysterectomy, bilateral salpingectomy, diagnostic cystoscopy.      PREOPERATIVE DIAGNOSIS:  Menorrhagia.      POSTOPERATIVE DIAGNOSIS:  Menorrhagia.      ANESTHESIA:  General.      SURGEON:  Akash Acosta MD      ASSISTANT:  Dr. Edie Che       ESTIMATED BLOOD LOSS:  20 mL.      DRAINS:  Schwarz catheter.      FINDINGS:  Wide uterus with scarring from previous  sections, status post bilateral tubal ligation.  Normal ovaries bilaterally.  Normal upper abdomen.      COMPLICATIONS:  None.      INDICATIONS FOR PROCEDURE:  The patient is a 39-year-old female who has had persistent menorrhagia.  She declines hormone therapy and wishes definitive therapy.  She had a preoperative endometrial biopsy, which was normal.      DESCRIPTION OF PROCEDURE:  The patient was brought to the operating room.  General anesthesia was administered.  Legs were placed in Austin stirrups.  She was then prepped and draped in the usual fashion.  Assistance by Dr. Che was requested due to the previous  sections, scarring and difficulty with morcellation within a bag.      The patient was prepped and draped in the usual fashion.  Cervix was infiltrated with a dilute vasopressin solution and dilute local anesthetic with epinephrine.  Tenaculum was applied to the cervix.      A transverse area below the umbilicus was infiltrated with local and a transverse incision made.  This was carried down bluntly to the fascia.  The fascia was then grasped and cut.  The posterior sheath was identified, grasped and cut.  The peritoneum was entered bluntly.  The laparoscopic hub was then placed.  Pneumoperitoneum was created.  Accessory trocars were placed in the left and right quadrants after infiltration with local.  Using Thunderbeat cautery device, the left distal segment of the tube was removed and then the proximal segment due to the fact that she had had  previous tubal ligation.  The utero-ovarian ligament was then cauterized and cut.  Round ligament cauterized and cut.  Anterior and posterior leaves of the broad ligament were developed.  The vesicouterine peritoneum was then developed through the scarring.  The uterine artery and vein were cauterized on the left side.  Similar procedures were then repeated on the patient's right side with removal of distal and proximal ends of the tube, transection of the round ligament and transection of the utero-ovarian ligament.  The uterine artery and vein were then cauterized and cut on the right side.  They were then cut on the left side.  Bladder was dissected down the lower uterine segment, and the isthmic portion of the uterus was transected.  Cervical mucus was identified and the cervical canal cauterized.  The PneumoLiner bag was then placed within the pelvis, the uterus placed within the bag.  A pseudo-pneumoperitoneum was then created and uterus morcellated without difficulty.  The bag was removed intact.  It was then squeezed to produce pressure.  There was no leakage of fluid noted.  The fascia was reapproximated with a running suture of 0 Vicryl.  All sutures were then reapproximated with subcuticular sutures of 4-0 Monocryl.      Cystoscopy was performed.  Flow was noted from both ureteral orifices.  Bladder was normal.  At this point, the Schawrz catheter was reinserted.  The procedure was terminated.  The patient was recalled from general anesthesia and transferred to the recovery room in stable condition.         NESTOR MEDEL MD             D: 2019   T: 2019   MT:       Name:     JUVENTINO THOMAS   MRN:      [id number]        Account:        [id number]   :      1980           Procedure Date: 2019      Document: [id number]

## 2019-11-13 NOTE — ANESTHESIA PREPROCEDURE EVALUATION
Anesthesia Pre-Procedure Evaluation    Patient: Anders Zepeda   MRN: [id number] : 1980          Preoperative Diagnosis: Menorrhagia [N92.0]    Procedure(s):  LAPAROSCOPIC SUPRACERVICAL HYSTERECTOMY  LAPAROSCOPIC BILATERAL SALPINGECTOMY  DIAGNOSTIC CYSTOSCOPY    Past Medical History:   Diagnosis Date     Ankle sprain      Anxiety      Bipolar 1 disorder (H)      Depression      Past Surgical History:   Procedure Laterality Date     ABDOMEN SURGERY      c section     ARTHROSCOPY ANKLE, OPEN REPAIR TENDON, COMBINED Left 2019    Procedure: 1.  Ankle arthroscopy with extensive synovectomy, left lower extremity.  2.  Peroneus brevis debridement, repair, left lower extremity.  3.  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  4.  Open reduction and internal fixation, left distal fibular fracture nonunion.  5.  Stressing of left ankle under anesthesia.   ;  Surgeon: Linus Chiu DPM;  Location: [facility] OR      SECTION  1998      SECTION  2000     HERNIA REPAIR       OPEN REDUCTION INTERNAL FIXATION ANKLE Left 2019    Procedure: Open reduction internal fixation ankle;  Surgeon: Linus Chiu DPM;  Location: [facility] OR     TUBAL LIGATION         Anesthesia Evaluation     . Pt has had prior anesthetic. Type: General    No history of anesthetic complications          ROS/MED HX    ENT/Pulmonary:     (+)LUIZA risk factors , . .   (-) tobacco use, asthma and COPD   Neurologic:      (-) CVA, TIA and Neuropathy   Cardiovascular:        (-) hypertension, CAD, irregular heartbeat/palpitations and stent   METS/Exercise Tolerance:     Hematologic:        (-) anemia   Musculoskeletal:         GI/Hepatic:        (-) GERD and liver disease   Renal/Genitourinary:      (-) renal disease   Endo:     (+) Obesity, .   (-) Type I DM, Type II DM and thyroid disease   Psychiatric:     (+) psychiatric history bipolar      Infectious Disease:  - neg infectious disease ROS       Malignancy:     "     Other:                          Physical Exam  Normal systems: cardiovascular, pulmonary and dental    Airway   Mallampati: III  TM distance: >3 FB  Neck ROM: full    Dental     Cardiovascular   Rhythm and rate: regular and normal      Pulmonary    breath sounds clear to auscultation            Lab Results   Component Value Date    WBC 6.2 10/01/2019    HGB 13.9 11/07/2019    HCT 32.9 (L) 10/01/2019     10/01/2019     08/09/2019    POTASSIUM 4.2 08/09/2019    CHLORIDE 107 08/09/2019    CO2 29 08/09/2019    BUN 13 08/09/2019    CR 0.68 08/09/2019    GLC 98 08/09/2019    ALEX 8.5 08/09/2019    ALBUMIN 3.2 (L) 08/09/2019    PROTTOTAL 6.5 (L) 08/09/2019    ALT 24 08/09/2019    AST 18 08/09/2019    ALKPHOS 84 08/09/2019    BILITOTAL 0.2 08/09/2019    TSH 1.48 08/09/2019    HCG Negative 04/17/2019    HCGS Negative 01/18/2011       Preop Vitals  BP Readings from Last 3 Encounters:   11/13/19 122/82   11/07/19 135/87   10/31/19 122/72    Pulse Readings from Last 3 Encounters:   11/07/19 69   10/31/19 72   10/10/19 73      Resp Readings from Last 3 Encounters:   11/13/19 20   10/10/19 20   10/03/19 18    SpO2 Readings from Last 3 Encounters:   11/13/19 99%   11/07/19 99%   10/10/19 98%      Temp Readings from Last 1 Encounters:   11/13/19 36.4  C (97.5  F) (Temporal)    Ht Readings from Last 1 Encounters:   11/13/19 1.638 m (5' 4.5\")      Wt Readings from Last 1 Encounters:   11/13/19 122.4 kg (269 lb 12.8 oz)    Estimated body mass index is 45.6 kg/m  as calculated from the following:    Height as of this encounter: 1.638 m (5' 4.5\").    Weight as of this encounter: 122.4 kg (269 lb 12.8 oz).       Anesthesia Plan      History & Physical Review  History and physical reviewed and following examination; no interval change.    ASA Status:  3 .    NPO Status:  > 6 hours    Plan for General and ETT with Intravenous induction. Maintenance will be Balanced.    PONV prophylaxis:  Ondansetron (or other 5HT-3), " Dexamethasone or Solumedrol and Other (See comment)  Additional equipment: Videolaryngoscope   Propofol infusion      Postoperative Care  Postoperative pain management:  Oral pain medications and IV analgesics.      Consents  Anesthetic plan, risks, benefits and alternatives discussed with:  Patient..                 Thong Bragg MD

## 2019-11-14 LAB — COPATH REPORT: NORMAL

## 2019-11-16 ENCOUNTER — APPOINTMENT (OUTPATIENT)
Dept: CT IMAGING | Facility: CLINIC | Age: 39
End: 2019-11-16
Attending: EMERGENCY MEDICINE
Payer: COMMERCIAL

## 2019-11-16 ENCOUNTER — HOSPITAL ENCOUNTER (EMERGENCY)
Facility: CLINIC | Age: 39
Discharge: HOME | End: 2019-11-16
Attending: EMERGENCY MEDICINE | Admitting: EMERGENCY MEDICINE
Payer: COMMERCIAL

## 2019-11-16 ENCOUNTER — NURSE TRIAGE (OUTPATIENT)
Dept: NURSING | Facility: CLINIC | Age: 39
End: 2019-11-16

## 2019-11-16 VITALS
SYSTOLIC BLOOD PRESSURE: 120 MMHG | RESPIRATION RATE: 16 BRPM | TEMPERATURE: 97.6 F | HEART RATE: 60 BPM | WEIGHT: 269 LBS | OXYGEN SATURATION: 97 % | BODY MASS INDEX: 44.82 KG/M2 | HEIGHT: 65 IN | DIASTOLIC BLOOD PRESSURE: 69 MMHG

## 2019-11-16 DIAGNOSIS — R10.30 LOWER ABDOMINAL PAIN: ICD-10-CM

## 2019-11-16 DIAGNOSIS — Z90.710 STATUS POST HYSTERECTOMY: ICD-10-CM

## 2019-11-16 LAB
ALBUMIN SERPL-MCNC: 3.3 G/DL (ref 3.4–5)
ALBUMIN UR-MCNC: NEGATIVE MG/DL
ALP SERPL-CCNC: 95 U/L (ref 40–150)
ALT SERPL W P-5'-P-CCNC: 21 U/L (ref 0–50)
ANION GAP SERPL CALCULATED.3IONS-SCNC: 4 MMOL/L (ref 3–14)
APPEARANCE UR: CLEAR
AST SERPL W P-5'-P-CCNC: 19 U/L (ref 0–45)
BASOPHILS # BLD AUTO: 0 10E9/L (ref 0–0.2)
BASOPHILS NFR BLD AUTO: 0.3 %
BILIRUB SERPL-MCNC: 0.3 MG/DL (ref 0.2–1.3)
BILIRUB UR QL STRIP: NEGATIVE
BUN SERPL-MCNC: 10 MG/DL (ref 7–30)
CALCIUM SERPL-MCNC: 8.4 MG/DL (ref 8.5–10.1)
CHLORIDE SERPL-SCNC: 107 MMOL/L (ref 94–109)
CO2 SERPL-SCNC: 29 MMOL/L (ref 20–32)
COLOR UR AUTO: ABNORMAL
CREAT SERPL-MCNC: 0.8 MG/DL (ref 0.52–1.04)
DIFFERENTIAL METHOD BLD: ABNORMAL
EOSINOPHIL # BLD AUTO: 0.1 10E9/L (ref 0–0.7)
EOSINOPHIL NFR BLD AUTO: 2.2 %
ERYTHROCYTE [DISTWIDTH] IN BLOOD BY AUTOMATED COUNT: 20.1 % (ref 10–15)
GFR SERPL CREATININE-BSD FRML MDRD: >90 ML/MIN/{1.73_M2}
GLUCOSE SERPL-MCNC: 90 MG/DL (ref 70–99)
GLUCOSE UR STRIP-MCNC: NEGATIVE MG/DL
HCT VFR BLD AUTO: 43.9 % (ref 35–47)
HGB BLD-MCNC: 14.2 G/DL (ref 11.7–15.7)
HGB UR QL STRIP: NEGATIVE
IMM GRANULOCYTES # BLD: 0 10E9/L (ref 0–0.4)
IMM GRANULOCYTES NFR BLD: 0.2 %
KETONES UR STRIP-MCNC: NEGATIVE MG/DL
LEUKOCYTE ESTERASE UR QL STRIP: NEGATIVE
LIPASE SERPL-CCNC: 56 U/L (ref 73–393)
LYMPHOCYTES # BLD AUTO: 2.2 10E9/L (ref 0.8–5.3)
LYMPHOCYTES NFR BLD AUTO: 37.4 %
MCH RBC QN AUTO: 29.2 PG (ref 26.5–33)
MCHC RBC AUTO-ENTMCNC: 32.3 G/DL (ref 31.5–36.5)
MCV RBC AUTO: 90 FL (ref 78–100)
MONOCYTES # BLD AUTO: 0.2 10E9/L (ref 0–1.3)
MONOCYTES NFR BLD AUTO: 3.7 %
MUCOUS THREADS #/AREA URNS LPF: PRESENT /LPF
NEUTROPHILS # BLD AUTO: 3.4 10E9/L (ref 1.6–8.3)
NEUTROPHILS NFR BLD AUTO: 56.2 %
NITRATE UR QL: NEGATIVE
NRBC # BLD AUTO: 0 10*3/UL
NRBC BLD AUTO-RTO: 0 /100
PH UR STRIP: 6.5 PH (ref 5–7)
PLATELET # BLD AUTO: 280 10E9/L (ref 150–450)
POTASSIUM SERPL-SCNC: 3.6 MMOL/L (ref 3.4–5.3)
PROT SERPL-MCNC: 7.2 G/DL (ref 6.8–8.8)
RBC # BLD AUTO: 4.86 10E12/L (ref 3.8–5.2)
RBC #/AREA URNS AUTO: 1 /HPF (ref 0–2)
SODIUM SERPL-SCNC: 140 MMOL/L (ref 133–144)
SOURCE: ABNORMAL
SP GR UR STRIP: 1.02 (ref 1–1.03)
SQUAMOUS #/AREA URNS AUTO: 8 /HPF (ref 0–1)
UROBILINOGEN UR STRIP-MCNC: NORMAL MG/DL (ref 0–2)
WBC # BLD AUTO: 6 10E9/L (ref 4–11)
WBC #/AREA URNS AUTO: <1 /HPF (ref 0–5)

## 2019-11-16 PROCEDURE — 74177 CT ABD & PELVIS W/CONTRAST: CPT

## 2019-11-16 PROCEDURE — 96361 HYDRATE IV INFUSION ADD-ON: CPT

## 2019-11-16 PROCEDURE — 25000128 H RX IP 250 OP 636: Performed by: EMERGENCY MEDICINE

## 2019-11-16 PROCEDURE — 83690 ASSAY OF LIPASE: CPT | Performed by: EMERGENCY MEDICINE

## 2019-11-16 PROCEDURE — 96375 TX/PRO/DX INJ NEW DRUG ADDON: CPT

## 2019-11-16 PROCEDURE — 85025 COMPLETE CBC W/AUTO DIFF WBC: CPT | Performed by: EMERGENCY MEDICINE

## 2019-11-16 PROCEDURE — 81001 URINALYSIS AUTO W/SCOPE: CPT | Performed by: EMERGENCY MEDICINE

## 2019-11-16 PROCEDURE — 25800030 ZZH RX IP 258 OP 636: Performed by: EMERGENCY MEDICINE

## 2019-11-16 PROCEDURE — 96374 THER/PROPH/DIAG INJ IV PUSH: CPT | Mod: 59

## 2019-11-16 PROCEDURE — 80053 COMPREHEN METABOLIC PANEL: CPT | Performed by: EMERGENCY MEDICINE

## 2019-11-16 PROCEDURE — 25000125 ZZHC RX 250: Performed by: EMERGENCY MEDICINE

## 2019-11-16 PROCEDURE — 99285 EMERGENCY DEPT VISIT HI MDM: CPT | Mod: 25

## 2019-11-16 RX ORDER — IOPAMIDOL 755 MG/ML
70 INJECTION, SOLUTION INTRAVASCULAR ONCE
Status: DISCONTINUED | OUTPATIENT
Start: 2019-11-16 | End: 2019-11-16 | Stop reason: CLARIF

## 2019-11-16 RX ORDER — SODIUM CHLORIDE 9 MG/ML
1000 INJECTION, SOLUTION INTRAVENOUS CONTINUOUS
Status: DISCONTINUED | OUTPATIENT
Start: 2019-11-16 | End: 2019-11-16 | Stop reason: HOSPADM

## 2019-11-16 RX ORDER — HYDROMORPHONE HYDROCHLORIDE 1 MG/ML
0.5 INJECTION, SOLUTION INTRAMUSCULAR; INTRAVENOUS; SUBCUTANEOUS
Status: DISCONTINUED | OUTPATIENT
Start: 2019-11-16 | End: 2019-11-16 | Stop reason: HOSPADM

## 2019-11-16 RX ORDER — ONDANSETRON 2 MG/ML
4 INJECTION INTRAMUSCULAR; INTRAVENOUS EVERY 30 MIN PRN
Status: DISCONTINUED | OUTPATIENT
Start: 2019-11-16 | End: 2019-11-16 | Stop reason: HOSPADM

## 2019-11-16 RX ORDER — IOPAMIDOL 755 MG/ML
135 INJECTION, SOLUTION INTRAVASCULAR ONCE
Status: COMPLETED | OUTPATIENT
Start: 2019-11-16 | End: 2019-11-16

## 2019-11-16 RX ADMIN — ONDANSETRON 4 MG: 2 INJECTION INTRAMUSCULAR; INTRAVENOUS at 11:06

## 2019-11-16 RX ADMIN — IOPAMIDOL 135 ML: 755 INJECTION, SOLUTION INTRAVENOUS at 10:51

## 2019-11-16 RX ADMIN — HYDROMORPHONE HYDROCHLORIDE 0.5 MG: 1 INJECTION, SOLUTION INTRAMUSCULAR; INTRAVENOUS; SUBCUTANEOUS at 11:06

## 2019-11-16 RX ADMIN — SODIUM CHLORIDE 80 ML: 9 INJECTION, SOLUTION INTRAVENOUS at 10:51

## 2019-11-16 RX ADMIN — SODIUM CHLORIDE 1000 ML: 9 INJECTION, SOLUTION INTRAVENOUS at 09:28

## 2019-11-16 ASSESSMENT — MIFFLIN-ST. JEOR: SCORE: 1883.12

## 2019-11-16 NOTE — TELEPHONE ENCOUNTER
Veronica is calling and states that she had a hysterectomy on Wednesday November 13th and today pain is worse.  Currently taking Oxycodone 5mg.  Yesterday and today pain is worse.  Veronica is having a deep cramping.        Reason for Disposition    Sounds like a serious complication to the triager    Additional Information    Negative: Sounds like a life-threatening emergency to the triager    Negative: [1] Widespread rash AND [2] bright red, sunburn-like    Negative: [1] SEVERE headache AND [2] after spinal (epidural) anesthesia    Negative: [1] Vomiting AND [2] persists > 4 hours    Negative: [1] Vomiting AND [2] abdomen looks much more swollen than usual    Negative: [1] Drinking very little AND [2] dehydration suspected (e.g., no urine > 12 hours, very dry mouth, very lightheaded)    Negative: Patient sounds very sick or weak to the triager    Protocols used: POST-OP SYMPTOMS AND [id number]

## 2019-11-16 NOTE — ED TRIAGE NOTES
Pt had hysterectomy Wednesday. Yesterday developed left-sided abdominal pain that's getting worse.

## 2019-11-16 NOTE — ED PROVIDER NOTES
"  History     Chief Complaint:  Abdominal Pain    The history is provided by the patient.      Anders Zepeda is a 39 year old female who presents with abdominal pain. The patient had a laparoscopic supracervical hysterectomy performed by Dr. Acosta on 19 for menorrhagia. The patient reports left-sided abdominal that started yesterday morning that is getting worse. The pain radiates around to her left back.  Sitting up helps with the pain but it is still present. She still has her ovaries. The patient has been taking oxycodone for the pain but she states that this pain is somewhat different that what she experienced the first few days after the outpatient surgery. She denies any drainage from the incisions. She denies any fever, dysuria, vomiting, and trouble breathing. Her appetite has been normal. She has some scant vaginal bleeding for 2 days after the surgery but denies any since then. She has had a slight cough since the surgery.       Allergies:  No known drug allergies.    Medications:    Zyprexa  Oxycodone     Past Medical History:    Ankle sprain  Anxiety  Bipolar 1 disorder  Depression  Morbid obesity  Edema  Cough  Sinus pressure  Iron deficiency anemia    Past Surgical History:     section x2  Arthroscopy ankle left  Hernia repair  Hysterectomy   ORIF ankle  Tubal ligation    Family History:    Cancer    Social History:  Patient is   Tobacco Use: Current smoker  Alcohol Use: Yes  PCP: Elsa Omalley     Review of Systems   All other systems reviewed and are negative.    Physical Exam   First Vitals:  Patient Vitals for the past 24 hrs:   BP Temp Temp src Pulse Heart Rate Resp SpO2 Height Weight   19 1142 120/69 -- -- 60 60 16 97 % -- --   19 0845 (!) 134/108 97.6  F (36.4  C) Oral 72 72 16 96 % 1.638 m (5' 4.5\") 122 kg (269 lb)     Physical Exam  General: nontoxic appearing woman sitting upright in room 1,  at bedside  HENT: mucous membranes moist   CV: regular " rate, regular rhythm, no LE edema, no murmur audible  Resp: clear throughout, normal effort, no crackles or wheezing  GI: abdomen soft, moderately tender in lower abdomen slightly more on L, somewhat protuberant abdomen, no guarding, bowel sounds present  MSK: no bony tenderness, no CVAT  Skin: appropriately warm and dry, surgical incisions c/d/i with steri strips  Neuro: alert, clear speech, oriented   Psych: cooperative      Emergency Department Course     Imaging:  Radiographic findings were communicated with the patient who voiced understanding of the findings.  CT abdomen pelvis w contrast:  1. Hysterectomy. Small bubbles of gas that are extraluminal within the  pelvis not unexpected after recent surgery.  2. Tiny locule of fluid at the subcutaneous fat at the incision site  could just be evolving very small hematoma. A small infectious  etiology not excluded at this position.  3. Two indeterminate small hypodense nodules in the liver. Consider  further characterization with nonurgent MRI. Per radiology read.     Laboratory:  CBC:  WBC 6.0, HGB 14.2,   CMP: Calcium 8.4 low, Albumin 3.3 low, o/w WNL. (Creatinine 0.80)  Lipase: 56 low  UA: Mucous present (A), Squamous Epithelial 8 high, o/w WNL    Interventions:  0928: NS 1L IV  1051: Isovue 135mL IV  1106: Zofran 4mg IV  1106: Dilaudid 0.5mg IV    Emergency Department Course:  8:56 AM Nursing notes and vitals reviewed.  I performed an exam of the patient as documented above.     I performed electronic chart review in EPIC.    10:07 AM I rechecked on and updated the patient.    11:10 AM I rechecked on and updated the patient.    11:49 AM I consulted with Dr. Edie Che of [facility] regarding the patient.    11:50 AM I rechecked on and updated the patient.    11:51 AM Findings and plan explained to the patient. Patient discharged home with instructions regarding supportive care, medications, and reasons to return. The importance of close  follow-up was reviewed.     Impression & Plan      Medical Decision Making:  The cause of her presenting lower abdominal discomfort in the setting of recent hysterectomy is unconfirmed despite detailed evaluation as above.  No signs of wound dehiscence or soft tissue infection.  CT shows no obvious internal hemorrhage or abscess.  Laboratory studies show no leukocytosis or anemia, she has no compelling evidence of urinary tract infection.  Regarding CT findings, there is nothing on exam to suggest a superficial abscess or the need for any procedural intervention at this time.  I spoke with the on-call gynecologist who agreed with the plan for discharge home and close outpatient follow-up early this coming week in the next few days.  Reassurance was provided to the patient while acknowledging the limitations of today's evaluation.  She was discharged home in improved condition in the care of her .      Diagnosis:    ICD-10-CM    1. Lower abdominal pain R10.30    2. Status post hysterectomy Z90.710        Disposition:  discharged to home    I, Bradley Aasen, am serving as a scribe on 11/16/2019 at 8:56 AM to personally document services performed by Adalberto Sun MD based on my observations and the provider's statements to me.     This record was created at least in part using electronic voice recognition software, so please excuse any typographical errors.         Adalberto Sun MD  11/16/19 0152

## 2019-11-16 NOTE — ED AVS SNAPSHOT
Emergency Department  [address] [facility] [address]  Phone:  [phone]  Fax:  [phone]                                    Anders Zepeda   MRN: [id number]    Department:   Emergency Department   Date of Visit:  11/16/2019           After Visit Summary Signature Page    I have received my discharge instructions, and my questions have been answered. I have discussed any challenges I see with this plan with the nurse or doctor.    ..........................................................................................................................................  Patient/Patient Representative Signature      ..........................................................................................................................................  Patient Representative Print Name and Relationship to Patient    ..................................................               ................................................  Date                                   Time    ..........................................................................................................................................  Reviewed by Signature/Title    ...................................................              ..............................................  Date                                               Time          22EPIC Rev 08/18

## 2019-12-02 PROBLEM — Z98.890 STATUS POST ORIF OF FRACTURE OF ANKLE: Status: RESOLVED | Noted: 2019-06-12 | Resolved: 2019-12-02

## 2019-12-02 PROBLEM — Z87.81 STATUS POST ORIF OF FRACTURE OF ANKLE: Status: RESOLVED | Noted: 2019-06-12 | Resolved: 2019-12-02

## 2019-12-02 PROBLEM — R60.9 EDEMA: Status: RESOLVED | Noted: 2019-06-12 | Resolved: 2019-12-02

## 2019-12-02 NOTE — PROGRESS NOTES
Subjective:  HPI                    Objective:  System    Physical Exam    General     ROS    Assessment/Plan:    DISCHARGE REPORT    Progress reporting period is from 6/11/19 to 9/24/19.      SUBJECTIVE  Subjective changes noted by patient:  Patient feels that she is progressing to the point that she can take it from here and perfom the exercise.   Current pain level is  2/10    Previous pain level was:   Initial Pain level: 4/10   Changes in function:  Yes (See Goal flowsheet attached for changes in current functional level) Changes in function: Yes, see goal flow sheet for change in function   Adverse reaction to treatment or activity: None     OBJECTIVE  Changes noted in objective findings:  Yes,  Patient is able to perform the stairs but does have weakness with the eccentric control on the stairs.  SL heel raise on the left x 25 reps.  DF Left AROM 8, PROM, 12, PF= 60.

## 2019-12-02 NOTE — PROGRESS NOTES
Assessment/Plan:    ASSESSMENT/PLAN  Updated problem list and treatment plan: Diagnosis 1:  S/P Left ankle surgery   STG/LTGs have been met:  Yes (See Goal flow sheet completed today.)  Progress toward STG/LTGs have been made:  Yes (See Goal flow sheet completed today.)  Assessment of Progress: The patient's condition is improving.  The patient's condition has potential to improve.  Patient is meeting short term goals and is progressing towards long term goals.  Self Management Plans:  Patient has been instructed in a home treatment program.  Patient  has been instructed in self management of symptoms.  Patient continues to require the following intervention to meet STG and LT's:  PT intervention is no longer required to meet STG/LTG.    Recommendations:  This patient is ready to be discharged from therapy and continue their home treatment program.  Patient reports that she feels capable carrying out her rehab on her own from here.     Please refer to the daily flowsheet for treatment today, total treatment time and time spent performing 1:1 timed codes.

## 2019-12-30 DIAGNOSIS — F31.81 BIPOLAR 2 DISORDER (H): ICD-10-CM

## 2019-12-31 RX ORDER — OLANZAPINE 5 MG/1
5 TABLET ORAL AT BEDTIME
Qty: 30 TABLET | Refills: 0 | OUTPATIENT
Start: 2019-12-31

## 2019-12-31 NOTE — TELEPHONE ENCOUNTER
Patient was returned to her PCP after the 8/8/19 office visit.     Zyprexa has not been prescribed since 8/8/19 for a 60 day supply.

## 2020-01-06 NOTE — PROGRESS NOTES
SUBJECTIVE:                                                   Anders Zepeda is a 39 year old female who presents to clinic today for the following health issue(s):  Patient presents with:  Hospital F/U: follow up from hysterectomy. Mid November. Pt states she is feeling just fine. No pain currently.        HPI:  S/P LASH for menorrhagia and anemia. Doing great. No pain. No bleeding. Bladder good.  Last Hb was 14      Patient's last menstrual period was 10/17/2019..     Patient is sexually active, .  Using hysterectomy for contraception.    reports that she has been smoking. She has a 25.00 pack-year smoking history. She has never used smokeless tobacco.  Tobacco Cessation Action Plan: Information offered: Patient not interested at this time  STD testing offered?  Declined    Health maintenance updated:  no    Today's PHQ-2 Score:   PHQ-2 (  [employer]) 2019   Q1: Little interest or pleasure in doing things 0   Q2: Feeling down, depressed or hopeless 0   PHQ-2 Score 0   Q1: Little interest or pleasure in doing things -   Q2: Feeling down, depressed or hopeless -   PHQ-2 Score -     Today's PHQ-9 Score:   PHQ-9 SCORE 10/31/2019   PHQ-9 Total Score MyChart -   PHQ-9 Total Score 2     Today's BRADY-7 Score:   BRADY-7 SCORE 10/31/2019   Total Score -   Total Score 2       Problem list and histories reviewed & adjusted, as indicated.  Additional history: as documented.    Patient Active Problem List   Diagnosis     Morbid obesity (H)     Cough     Post-nasal drip     Nasal congestion     Sinus pressure     Medication side effects     Bipolar 2 disorder (H)     Primary insomnia     Iron deficiency anemia due to chronic blood loss     Side effect of medication     Menorrhagia     Past Surgical History:   Procedure Laterality Date     ABDOMEN SURGERY      c section     ARTHROSCOPY ANKLE, OPEN REPAIR TENDON, COMBINED Left 2019    Procedure: 1.  Ankle arthroscopy with extensive synovectomy, left lower  Dr. Dr. Gregorio extremity.  2.  Peroneus brevis debridement, repair, left lower extremity.  3.  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  4.  Open reduction and internal fixation, left distal fibular fracture nonunion.  5.  Stressing of left ankle under anesthesia.   ;  Surgeon: Linus Chiu DPM;  Location: [facility] OR      SECTION  1998      SECTION  2000     CYSTOSCOPY N/A 2019    Procedure: DIAGNOSTIC CYSTOSCOPY;  Surgeon: Akash Acosta MD;  Location: [facility] OR     HERNIA REPAIR       LAPAROSCOPIC HYSTERECTOMY SUPRACERVICAL N/A 2019    Procedure: LAPAROSCOPIC SUPRACERVICAL HYSTERECTOMY;  Surgeon: Akash Acosta MD;  Location: [facility]     LAPAROSCOPIC SALPINGECTOMY Bilateral 2019    Procedure: LAPAROSCOPIC BILATERAL SALPINGECTOMY;  Surgeon: Akash Acosta MD;  Location: [facility]     OPEN REDUCTION INTERNAL FIXATION ANKLE Left 2019    Procedure: Open reduction internal fixation ankle;  Surgeon: Linus Chiu DPM;  Location: [facility] OR     TUBAL LIGATION        Social History     Tobacco Use     Smoking status: Current Every Day Smoker     Packs/day: 1.00     Years: 25.00     Pack years: 25.00     Smokeless tobacco: Never Used   Substance Use Topics     Alcohol use: Yes     Comment: 2 drinks per month      Problem (# of Occurrences) Relation (Name,Age of Onset)    Family History Negative (1) Father    Uterine Cancer (1) Mother            Current Outpatient Medications   Medication Sig     OLANZapine (ZYPREXA) 5 MG tablet Take 1 tablet (5 mg) by mouth At Bedtime     vitamin D3 (CHOLECALCIFEROL) 400 units capsule Take 1 capsule (400 Units) by mouth daily     No current facility-administered medications for this visit.      No Known Allergies    ROS:  12 point review of systems negative other than symptoms noted below or in the HPI.        OBJECTIVE:     /72   Pulse 62   Wt 122.5 kg (270 lb)   LMP 10/17/2019   BMI 45.63 kg/m    Body mass index is  45.63 kg/m .    Exam:  Constitutional:  Appearance: Well nourished, well developed alert, in no acute distress  Gastrointestinal:  Incisions healing. No hernia  Neurologic:  Mental Status:  Oriented X3.  Normal strength and tone, sensory exam grossly normal, mentation intact and speech normal.    Psychiatric:  Mentation appears normal and affect normal/bright.     In-Clinic Test Results:  No results found for this or any previous visit (from the past 24 hour(s)).    ASSESSMENT/PLAN:                                                        ICD-10-CM    1. Menorrhagia with irregular cycle N92.1    2. Iron deficiency anemia due to chronic blood loss D50.0          Normal postop check. Discussed ovaries still present and continued ovulations.     Akash Acosta MD  [facility] [place]

## 2020-01-07 ENCOUNTER — OFFICE VISIT (OUTPATIENT)
Dept: OBGYN | Facility: CLINIC | Age: 40
End: 2020-01-07
Payer: COMMERCIAL

## 2020-01-07 VITALS
BODY MASS INDEX: 45.63 KG/M2 | HEART RATE: 62 BPM | SYSTOLIC BLOOD PRESSURE: 122 MMHG | DIASTOLIC BLOOD PRESSURE: 72 MMHG | WEIGHT: 270 LBS

## 2020-01-07 DIAGNOSIS — D50.0 IRON DEFICIENCY ANEMIA DUE TO CHRONIC BLOOD LOSS: ICD-10-CM

## 2020-01-07 DIAGNOSIS — N92.1 MENORRHAGIA WITH IRREGULAR CYCLE: Primary | ICD-10-CM

## 2020-01-07 PROCEDURE — 99024 POSTOP FOLLOW-UP VISIT: CPT | Performed by: OBSTETRICS & GYNECOLOGY

## 2020-01-07 NOTE — LETTER
2020        RE: Anders Zepeda  [address]  [facility] [address]            SUBJECTIVE:                                                   Anders Zepeda is a 39 year old female who presents to clinic today for the following health issue(s):  Patient presents with:  Hospital F/U: follow up from hysterectomy. Mid November. Pt states she is feeling just fine. No pain currently.        HPI:  S/P LASH for menorrhagia and anemia. Doing great. No pain. No bleeding. Bladder good.  Last Hb was 14      Patient's last menstrual period was 10/17/2019..     Patient is sexually active, .  Using hysterectomy for contraception.    reports that she has been smoking. She has a 25.00 pack-year smoking history. She has never used smokeless tobacco.  Tobacco Cessation Action Plan: Information offered: Patient not interested at this time  STD testing offered?  Declined    Health maintenance updated:  no    Today's PHQ-2 Score:   PHQ-2 (  [employer]) 2019   Q1: Little interest or pleasure in doing things 0   Q2: Feeling down, depressed or hopeless 0   PHQ-2 Score 0   Q1: Little interest or pleasure in doing things -   Q2: Feeling down, depressed or hopeless -   PHQ-2 Score -     Today's PHQ-9 Score:   PHQ-9 SCORE 10/31/2019   PHQ-9 Total Score MyChart -   PHQ-9 Total Score 2     Today's BRADY-7 Score:   BRADY-7 SCORE 10/31/2019   Total Score -   Total Score 2       Problem list and histories reviewed & adjusted, as indicated.  Additional history: as documented.    Patient Active Problem List   Diagnosis     Morbid obesity (H)     Cough     Post-nasal drip     Nasal congestion     Sinus pressure     Medication side effects     Bipolar 2 disorder (H)     Primary insomnia     Iron deficiency anemia due to chronic blood loss     Side effect of medication     Menorrhagia     Past Surgical History:   Procedure Laterality Date     ABDOMEN SURGERY      c section     ARTHROSCOPY ANKLE, OPEN REPAIR TENDON, COMBINED Left  2019    Procedure: 1.  Ankle arthroscopy with extensive synovectomy, left lower extremity.  2.  Peroneus brevis debridement, repair, left lower extremity.  3.  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  4.  Open reduction and internal fixation, left distal fibular fracture nonunion.  5.  Stressing of left ankle under anesthesia.   ;  Surgeon: Linus Chiu DPM;  Location: [facility] OR      SECTION  1998      SECTION  2000     CYSTOSCOPY N/A 2019    Procedure: DIAGNOSTIC CYSTOSCOPY;  Surgeon: Akash Acosta MD;  Location: [facility]     HERNIA REPAIR       LAPAROSCOPIC HYSTERECTOMY SUPRACERVICAL N/A 2019    Procedure: LAPAROSCOPIC SUPRACERVICAL HYSTERECTOMY;  Surgeon: Akash Acosta MD;  Location: [facility]     LAPAROSCOPIC SALPINGECTOMY Bilateral 2019    Procedure: LAPAROSCOPIC BILATERAL SALPINGECTOMY;  Surgeon: Akash Acosta MD;  Location: [facility]     OPEN REDUCTION INTERNAL FIXATION ANKLE Left 2019    Procedure: Open reduction internal fixation ankle;  Surgeon: Linus Chiu DPM;  Location: [facility] OR     TUBAL LIGATION        Social History     Tobacco Use     Smoking status: Current Every Day Smoker     Packs/day: 1.00     Years: 25.00     Pack years: 25.00     Smokeless tobacco: Never Used   Substance Use Topics     Alcohol use: Yes     Comment: 2 drinks per month      Problem (# of Occurrences) Relation (Name,Age of Onset)    Family History Negative (1) Father    Uterine Cancer (1) Mother            Current Outpatient Medications   Medication Sig     OLANZapine (ZYPREXA) 5 MG tablet Take 1 tablet (5 mg) by mouth At Bedtime     vitamin D3 (CHOLECALCIFEROL) 400 units capsule Take 1 capsule (400 Units) by mouth daily     No current facility-administered medications for this visit.      No Known Allergies    ROS:  12 point review of systems negative other than symptoms noted below or in the HPI.        OBJECTIVE:     /72   Pulse 62    Wt 122.5 kg (270 lb)   LMP 10/17/2019   BMI 45.63 kg/m     Body mass index is 45.63 kg/m .    Exam:  Constitutional:  Appearance: Well nourished, well developed alert, in no acute distress  Gastrointestinal:  Incisions healing. No hernia  Neurologic:  Mental Status:  Oriented X3.  Normal strength and tone, sensory exam grossly normal, mentation intact and speech normal.    Psychiatric:  Mentation appears normal and affect normal/bright.     In-Clinic Test Results:  No results found for this or any previous visit (from the past 24 hour(s)).    ASSESSMENT/PLAN:                                                        ICD-10-CM    1. Menorrhagia with irregular cycle N92.1    2. Iron deficiency anemia due to chronic blood loss D50.0          Normal postop check. Discussed ovaries still present and continued ovulations.     Akash Acosta MD  [facility] [place]      Sincerely,        Akash Acosta MD

## 2020-01-15 ENCOUNTER — OFFICE VISIT (OUTPATIENT)
Dept: FAMILY MEDICINE | Facility: CLINIC | Age: 40
End: 2020-01-15
Payer: COMMERCIAL

## 2020-01-15 VITALS
HEIGHT: 65 IN | WEIGHT: 271.8 LBS | TEMPERATURE: 97.5 F | HEART RATE: 81 BPM | OXYGEN SATURATION: 96 % | SYSTOLIC BLOOD PRESSURE: 127 MMHG | BODY MASS INDEX: 45.29 KG/M2 | DIASTOLIC BLOOD PRESSURE: 83 MMHG

## 2020-01-15 DIAGNOSIS — F51.01 PRIMARY INSOMNIA: Primary | ICD-10-CM

## 2020-01-15 DIAGNOSIS — E55.9 VITAMIN D DEFICIENCY: ICD-10-CM

## 2020-01-15 DIAGNOSIS — Z76.0 ENCOUNTER FOR MEDICATION REFILL: ICD-10-CM

## 2020-01-15 DIAGNOSIS — F31.81 BIPOLAR 2 DISORDER (H): ICD-10-CM

## 2020-01-15 DIAGNOSIS — E66.01 MORBID OBESITY (H): ICD-10-CM

## 2020-01-15 PROCEDURE — 99214 OFFICE O/P EST MOD 30 MIN: CPT | Performed by: INTERNAL MEDICINE

## 2020-01-15 RX ORDER — OLANZAPINE 5 MG/1
5 TABLET ORAL
Qty: 90 TABLET | Refills: 3 | Status: SHIPPED | OUTPATIENT
Start: 2020-01-15 | End: 2022-07-21

## 2020-01-15 ASSESSMENT — MIFFLIN-ST. JEOR: SCORE: 1895.82

## 2020-01-15 NOTE — PROGRESS NOTES
Chief Complaint:       Anders Zepeda is a 39 year old female who presents to clinic today for the following health issues:      Chief Complaint   Patient presents with     Medication Follow-up     OLANZapine (ZYPREXA) 5 MG tablet         HPI:   Patient Anders Zepeda is a very pleasant 39 year old female with history of bipolar disorder, vitamin d deficiency, chronic morbid obesity who presents to Internal Medicine clinic today for follow up of multiple concerns including medication refills. Regarding the patient's chronic vitamin d deficiency, the patient is compliant with her vitamin D supplementation medication. Regarding the patient's chronic bipolar disorder with insomnia, the patient is due for a refill of her low dose Zyprexa medication at this time. No acute suicidal ideations, hallucinations or alanna symptoms.         Current Medications:     Current Outpatient Medications   Medication Sig Dispense Refill     OLANZapine (ZYPREXA) 5 MG tablet Take 1 tablet (5 mg) by mouth nightly as needed (insomnia) 90 tablet 3     vitamin D3 (CHOLECALCIFEROL) 400 units capsule Take 1 capsule (400 Units) by mouth daily           Allergies:    No Known Allergies         Past Medical History:     Past Medical History:   Diagnosis Date     Ankle sprain      Anxiety      Bipolar 1 disorder (H)      Depression          Past Surgical History:     Past Surgical History:   Procedure Laterality Date     ABDOMEN SURGERY      c section     ARTHROSCOPY ANKLE, OPEN REPAIR TENDON, COMBINED Left 2019    Procedure: 1.  Ankle arthroscopy with extensive synovectomy, left lower extremity.  2.  Peroneus brevis debridement, repair, left lower extremity.  3.  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  4.  Open reduction and internal fixation, left distal fibular fracture nonunion.  5.  Stressing of left ankle under anesthesia.   ;  Surgeon: Linus Chiu DPM;  Location: [facility] OR      SECTION  1998       SECTION  11/2000     CYSTOSCOPY N/A 11/13/2019    Procedure: DIAGNOSTIC CYSTOSCOPY;  Surgeon: Akash Acosta MD;  Location: [facility] OR     HERNIA REPAIR       LAPAROSCOPIC HYSTERECTOMY SUPRACERVICAL N/A 11/13/2019    Procedure: LAPAROSCOPIC SUPRACERVICAL HYSTERECTOMY;  Surgeon: Akash Acosta MD;  Location: [facility]     LAPAROSCOPIC SALPINGECTOMY Bilateral 11/13/2019    Procedure: LAPAROSCOPIC BILATERAL SALPINGECTOMY;  Surgeon: Akash Acosta MD;  Location: [facility]     OPEN REDUCTION INTERNAL FIXATION ANKLE Left 4/17/2019    Procedure: Open reduction internal fixation ankle;  Surgeon: Linus Chiu DPM;  Location: [facility] OR     TUBAL LIGATION  2000         Family Medical History:     Family History   Problem Relation Age of Onset     Uterine Cancer Mother      Family History Negative Father          Social History:     Social History     Socioeconomic History     Marital status:      Spouse name: Not on file     Number of children: Not on file     Years of education: Not on file     Highest education level: Not on file   Occupational History     Not on file   Social Needs     Financial resource strain: Not on file     Food insecurity:     Worry: Not on file     Inability: Not on file     Transportation needs:     Medical: Not on file     Non-medical: Not on file   Tobacco Use     Smoking status: Current Every Day Smoker     Packs/day: 1.00     Years: 25.00     Pack years: 25.00     Smokeless tobacco: Never Used   Substance and Sexual Activity     Alcohol use: Yes     Comment: 2 drinks per month     Drug use: Never     Sexual activity: Yes     Partners: Male   Lifestyle     Physical activity:     Days per week: Not on file     Minutes per session: Not on file     Stress: Not on file   Relationships     Social connections:     Talks on phone: Not on file     Gets together: Not on file     Attends Zoroastrian service: Not on file     Active member of club or organization: Not on file     Attends  "meetings of clubs or organizations: Not on file     Relationship status: Not on file     Intimate partner violence:     Fear of current or ex partner: Not on file     Emotionally abused: Not on file     Physically abused: Not on file     Forced sexual activity: Not on file   Other Topics Concern     Parent/sibling w/ CABG, MI or angioplasty before 65F 55M? Not Asked   Social History Narrative     Not on file           Review of System:     Constitutional: Negative for fever or chills  Skin: Negative for rashes  Ears/Nose/Throat: Negative for nasal congestion, sore throat  Respiratory: No shortness of breath, dyspnea on exertion, cough, or hemoptysis  Cardiovascular: Negative for chest pain  Gastrointestinal: Negative for nausea, vomiting  Genitourinary: Negative for dysuria, hematuria  Musculoskeletal: Negative for myalgias  Neurologic: Negative for headaches  Psychiatric: positive for insomnia, bipolar disorders  Hematologic/Lymphatic/Immunologic: Negative  Endocrine: Negative for chronic vitamin d deficiency  Behavioral: Negative for tobacco use       Physical Exam:   /83 (BP Location: Right arm, Patient Position: Sitting, Cuff Size: Adult Large)   Pulse 81   Temp 97.5  F (36.4  C) (Oral)   Ht 1.638 m (5' 4.5\")   Wt 123.3 kg (271 lb 12.8 oz)   LMP 10/17/2019   SpO2 96%   Breastfeeding No   BMI 45.93 kg/m      GENERAL: alert and no distress  EYES: eyes grossly normal to inspection, and conjunctivae and sclerae normal  HENT: Normocephalic atraumatic. Nose and mouth without ulcers or lesions  NECK: supple  RESP: lungs clear to auscultation   CV: regular rate and rhythm, normal S1 S2  LYMPH: no peripheral edema   ABDOMEN: nondistended  MS: no gross musculoskeletal defects noted  SKIN: no suspicious lesions or rashes  NEURO: Alert & Oriented x 3.   PSYCH: mentation appears normal, affect normal, no acute suicidal ideations, or hallucinations or alanna symptoms.        Diagnostic Test Results:     Last " Comprehensive Metabolic Panel:  Sodium   Date Value Ref Range Status   11/16/2019 140 133 - 144 mmol/L Final     Potassium   Date Value Ref Range Status   11/16/2019 3.6 3.4 - 5.3 mmol/L Final     Chloride   Date Value Ref Range Status   11/16/2019 107 94 - 109 mmol/L Final     Carbon Dioxide   Date Value Ref Range Status   11/16/2019 29 20 - 32 mmol/L Final     Anion Gap   Date Value Ref Range Status   11/16/2019 4 3 - 14 mmol/L Final     Glucose   Date Value Ref Range Status   11/16/2019 90 70 - 99 mg/dL Final     Urea Nitrogen   Date Value Ref Range Status   11/16/2019 10 7 - 30 mg/dL Final     Creatinine   Date Value Ref Range Status   11/16/2019 0.80 0.52 - 1.04 mg/dL Final     GFR Estimate   Date Value Ref Range Status   11/16/2019 >90 >60 mL/min/[1.73_m2] Final     Comment:     Non  GFR Calc  Starting 12/18/2018, serum creatinine based estimated GFR (eGFR) will be   calculated using the Chronic Kidney Disease Epidemiology Collaboration   (CKD-EPI) equation.       Calcium   Date Value Ref Range Status   11/16/2019 8.4 (L) 8.5 - 10.1 mg/dL Final           ASSESSMENT/PLAN:       (F51.01) Primary insomnia  (primary encounter diagnosis)  (F31.81) Bipolar 2 disorder (H)  Comment: symptoms stable on low dose Zyprexa. Patient is due for a refill of her Zyprexa medication at this time. Patient denies any acute suicidal ideations, or hallucinations or alanna symptoms.  Plan: OLANZapine (ZYPREXA) 5 MG tablet, continue current outpatient psychology clinic follow up going forward.    (E66.01) Morbid obesity (H)  Comment: chronic morbid obesity  Plan: I have advised the patient to start a new weight loss program through diet and exercise going forward.    (E55.9) Vitamin D deficiency  Comment: Regarding the patient's chronic vitamin d deficiency, the patient is compliant with her vitamin D supplementation medication.   Plan: continue vitamin D supplementation medication.      Follow Up Plan:     Patient is  instructed to return to Internal Medicine clinic for follow-up visit in 3 months.        Elsa Omalley MD  Internal Medicine  [facility]

## 2021-01-09 ENCOUNTER — HEALTH MAINTENANCE LETTER (OUTPATIENT)
Age: 41
End: 2021-01-09

## 2021-01-13 ENCOUNTER — OFFICE VISIT (OUTPATIENT)
Dept: FAMILY MEDICINE | Facility: CLINIC | Age: 41
End: 2021-01-13
Payer: COMMERCIAL

## 2021-01-13 VITALS
TEMPERATURE: 97.1 F | SYSTOLIC BLOOD PRESSURE: 121 MMHG | WEIGHT: 266 LBS | HEART RATE: 64 BPM | OXYGEN SATURATION: 96 % | HEIGHT: 65 IN | DIASTOLIC BLOOD PRESSURE: 75 MMHG | BODY MASS INDEX: 44.32 KG/M2

## 2021-01-13 DIAGNOSIS — G89.29 CHRONIC PAIN OF BOTH ANKLES: Primary | ICD-10-CM

## 2021-01-13 DIAGNOSIS — Z53.9 ERRONEOUS ENCOUNTER--DISREGARD: ICD-10-CM

## 2021-01-13 DIAGNOSIS — M25.571 CHRONIC PAIN OF BOTH ANKLES: Primary | ICD-10-CM

## 2021-01-13 DIAGNOSIS — M25.572 CHRONIC PAIN OF BOTH ANKLES: Primary | ICD-10-CM

## 2021-01-13 ASSESSMENT — MIFFLIN-ST. JEOR: SCORE: 1864.51

## 2021-01-13 NOTE — PROGRESS NOTES
Subjective     Veronica is a 40 year old who presents to clinic today for the following health issues     HPI     Chief Complaint   Patient presents with     Musculoskeletal Problem             Review of Systems         Objective    LMP 10/17/2019   There is no height or weight on file to calculate BMI.  Physical Exam         Referred to Ortho for chronic pain of both ankles.

## 2021-01-13 NOTE — ADDENDUM NOTE
Addended by: EPHRAIM LEROY on: 1/13/2021 09:35 AM     Modules accepted: Level of Service, SmartSet

## 2021-01-18 ENCOUNTER — ANCILLARY PROCEDURE (OUTPATIENT)
Dept: GENERAL RADIOLOGY | Facility: CLINIC | Age: 41
End: 2021-01-18
Attending: PODIATRIST
Payer: COMMERCIAL

## 2021-01-18 ENCOUNTER — OFFICE VISIT (OUTPATIENT)
Dept: PODIATRY | Facility: CLINIC | Age: 41
End: 2021-01-18
Attending: INTERNAL MEDICINE
Payer: COMMERCIAL

## 2021-01-18 VITALS — SYSTOLIC BLOOD PRESSURE: 112 MMHG | DIASTOLIC BLOOD PRESSURE: 70 MMHG

## 2021-01-18 DIAGNOSIS — M25.571 CHRONIC PAIN OF BOTH ANKLES: ICD-10-CM

## 2021-01-18 DIAGNOSIS — G89.29 CHRONIC PAIN OF BOTH ANKLES: ICD-10-CM

## 2021-01-18 DIAGNOSIS — M25.572 CHRONIC PAIN OF BOTH ANKLES: ICD-10-CM

## 2021-01-18 PROCEDURE — 99213 OFFICE O/P EST LOW 20 MIN: CPT | Mod: 25 | Performed by: PODIATRIST

## 2021-01-18 PROCEDURE — 20605 DRAIN/INJ JOINT/BURSA W/O US: CPT | Mod: 50 | Performed by: PODIATRIST

## 2021-01-18 PROCEDURE — 73610 X-RAY EXAM OF ANKLE: CPT | Mod: RT | Performed by: RADIOLOGY

## 2021-01-18 RX ORDER — TRIAMCINOLONE ACETONIDE 40 MG/ML
40 INJECTION, SUSPENSION INTRA-ARTICULAR; INTRAMUSCULAR
Status: SHIPPED | OUTPATIENT
Start: 2021-01-18

## 2021-01-18 RX ORDER — LIDOCAINE HYDROCHLORIDE 10 MG/ML
0.5 INJECTION, SOLUTION INFILTRATION; PERINEURAL
Status: SHIPPED | OUTPATIENT
Start: 2021-01-18

## 2021-01-18 RX ADMIN — TRIAMCINOLONE ACETONIDE 40 MG: 40 INJECTION, SUSPENSION INTRA-ARTICULAR; INTRAMUSCULAR at 14:38

## 2021-01-18 RX ADMIN — LIDOCAINE HYDROCHLORIDE 0.5 ML: 10 INJECTION, SOLUTION INFILTRATION; PERINEURAL at 14:38

## 2021-01-18 NOTE — PATIENT INSTRUCTIONS
Thank you for choosing [facility] Podiat[facility] / Foot & Ankle Surgery!    DR. LAWS'S CLINIC LOCATIONS:   [facility]   [address] [facility] [address]   [place] [address]                        [place] [address] [phone]      SET UP SURGERY: [phone]   APPOINTMENTS: [phone]   BILLING QUESTIONS: [phone]   FAX NUMBER: [phone]     Follow up: 2 weeks for next injections    Today your received a diagnostic/therapeutic steroid injection.    We would like you to monitor 3 factors after the injection to determine what future treatment modalities would be most appropriate:    1.  Percentage improvement - while the injection will wear off and your symptoms may return, what was the highest level of improvement.  2. Location - where did you notice improvement, and conversely, where did you not see improvement?  3. Duration - how long did the improvement last for.    You do not need to schedule a follow-up visit.  When the injection wears off, and if they symptoms return to the point of needing further clinic follow up, whether a month or year has passed, schedule an appointment and we'll decide what the next step is.

## 2021-01-18 NOTE — PROGRESS NOTES
"Foot & Ankle Surgery  2021    CC: \"bilateral ankle pain\"    I was asked to see Anders Zepeda regarding the chief complaint by:  Dr. Omalley    HPI:  Pt is a 40 year old female who presents with above complaint.  Bilateral ankle pain.  Underwent surgery by myself  for ankle pain, peroneal tendon tear, distal fib fx non-union.  Also had injury right ankle 6-7 years ago, sounds like a ligament avulsed a portion of the fibula.  Also notes posterior R ankle pain.    ROS:   Pos for CC.  The patient denies current nausea, vomiting, chills, fevers, belly pain, calf pain, chest pain or SOB.  Complete remainder of ROS is otherwise neg.    VITALS:    Vitals:    21 1335   BP: 112/70       PMH:    Past Medical History:   Diagnosis Date     Ankle sprain      Anxiety      Bipolar 1 disorder (H)      Depression        SXHX:    Past Surgical History:   Procedure Laterality Date     ABDOMEN SURGERY      c section     ARTHROSCOPY ANKLE, OPEN REPAIR TENDON, COMBINED Left 2019    Procedure: 1.  Ankle arthroscopy with extensive synovectomy, left lower extremity.  2.  Peroneus brevis debridement, repair, left lower extremity.  3.  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  4.  Open reduction and internal fixation, left distal fibular fracture nonunion.  5.  Stressing of left ankle under anesthesia.   ;  Surgeon: Linus Chiu DPM;  Location:  OR      SECTION  1998      SECTION  2000     CYSTOSCOPY N/A 2019    Procedure: DIAGNOSTIC CYSTOSCOPY;  Surgeon: Akash Acosta MD;  Location:  OR     HERNIA REPAIR       LAPAROSCOPIC HYSTERECTOMY SUPRACERVICAL N/A 2019    Procedure: LAPAROSCOPIC SUPRACERVICAL HYSTERECTOMY;  Surgeon: Akash Acosta MD;  Location:  OR     LAPAROSCOPIC SALPINGECTOMY Bilateral 2019    Procedure: LAPAROSCOPIC BILATERAL SALPINGECTOMY;  Surgeon: Akash Acosta MD;  Location:  OR     OPEN REDUCTION INTERNAL " FIXATION ANKLE Left 4/17/2019    Procedure: Open reduction internal fixation ankle;  Surgeon: Linus Chiu DPM;  Location: [facility] OR     TUBAL LIGATION  2000        MEDS:    Current Outpatient Medications   Medication     OLANZapine (ZYPREXA) 5 MG tablet     vitamin D3 (CHOLECALCIFEROL) 400 units capsule     No current facility-administered medications for this visit.        ALL:     Allergies   Allergen Reactions     No Known Allergies        FMH:    Family History   Problem Relation Age of Onset     Uterine Cancer Mother      Mental Illness Mother         Bipolar disorder     Family History Negative Father        SocHx:    Social History     Socioeconomic History     Marital status:      Spouse name: Not on file     Number of children: Not on file     Years of education: Not on file     Highest education level: Not on file   Occupational History     Not on file   Social Needs     Financial resource strain: Not on file     Food insecurity     Worry: Not on file     Inability: Not on file     Transportation needs     Medical: Not on file     Non-medical: Not on file   Tobacco Use     Smoking status: Current Every Day Smoker     Packs/day: 1.00     Years: 25.00     Pack years: 25.00     Types: Cigarettes     Smokeless tobacco: Never Used   Substance and Sexual Activity     Alcohol use: Yes     Comment: 2 drinks per month     Drug use: Not Currently     Sexual activity: Yes     Partners: Male     Birth control/protection: Female Surgical     Comment: Tubal ligation 11/00   Lifestyle     Physical activity     Days per week: Not on file     Minutes per session: Not on file     Stress: Not on file   Relationships     Social connections     Talks on phone: Not on file     Gets together: Not on file     Attends Nondenominational service: Not on file     Active member of club or organization: Not on file     Attends meetings of clubs or organizations: Not on file     Relationship status: Not on file     Intimate partner  violence     Fear of current or ex partner: Not on file     Emotionally abused: Not on file     Physically abused: Not on file     Forced sexual activity: Not on file   Other Topics Concern     Parent/sibling w/ CABG, MI or angioplasty before 65F 55M? No   Social History Narrative     Not on file           EXAMINATION:  Gen:   No apparent distress  Neuro:   A&Ox3, no deficits  Psych:    Answering questions appropriately for age and situation with normal affect  Head:    NCAT  Eye:    Visual scanning without deficit  Ear:    Response to auditory stimuli wnl  Lung:    Non-labored breathing on RA noted  Abd:    NTND per patient report  Lymph:    Neg for pitting/non-pitting edema BLE  Vasc:    Pulses palpable, CFT minimally delayed  Neuro:    Light touch sensation intact to all sensory nerve distributions without paresthesias  Derm:    Neg for nodules, lesions or ulcerations  MSK:    Bilateral lower extremity - tender at medial recess and some anterior gutter pain.  Very tender at sinus tarsi, and pain R>L circumferentially around the level of the subtalar joint.  Some discomfort right lower extremity along peroneal tendons posterior to fibula, and at Achilles insertion  Calf:    Neg for redness, swelling or tenderness      Imaging:  IMPRESSION:   Right: No evidence of fracture. Mild spurring tip of the lateral  malleolus. Mortise and talar dome are intact.     Left: No change in ORIF of distal fibula. Mortise and talar dome are  intact. No acute fracture.    Assessment:  40 year old female with bilateral ankle pain, including intra-articular ankle joint pain, sinus tarsi/STJ pain, and right lower extremity peroneal/Achilles pain      Plan:  Discussed etiologies, anatomy and options  1.  bilateral ankle pain, including intra-articular ankle joint pain, sinus tarsi/STJ pain, and right lower extremity peroneal/Achilles pain  -I personally reviewed the patient's lower extremity history pertinent to today's visit, including  imaging/labs, in preparation for initiating a treatment program.  -I personally reviewed imaging results today, including implication in treatment options  -dx/tx ankle joint injection, see procedure note. Advised she monitor %, location, duration.    -follow up in 2-3 weeks for dx/tx sinus tarsi injection.  Hopefully a staggered approach with these and with ankle joint injections will yield better results as to which is main cause of pain  -consider MRI right lower extremity for peroneals and Achilles insertion pain  -RICE/NSAID vs tylenol    Medium Joint Injection/Arthrocentesis: bilateral ankle    Date/Time: 1/18/2021 2:38 PM  Performed by: Linus Chiu DPM  Authorized by: Linus Chui DPM     Needle Size:  25 G  Guidance: surface landmarks    Approach:  Anteromedial  Location:  Ankle  Laterality:  Bilateral  Site:  Bilateral ankle  Medications (Right):  40 mg triamcinolone 40 MG/ML; 0.5 mL lidocaine 1 %  Medications (Left):  40 mg triamcinolone 40 MG/ML; 0.5 mL lidocaine 1 %   After obtaining written consent, the joint was identified and the skin was prepped with alcohol and betadine.  The joint was distracted and the needle was advance to the underlying left and right ankle joints.  1 1/2cc mixture of 2:1 kenalog 40:1% lidocaine plain was injected.  The patient tolerated the procedure without complication.  Risks that were discussed included possible joint/cartilage damage, infection, pigment change, steroid flare.             Follow up:  2-3 weeks or sooner with acute issues      Patient's medical history was reviewed today      Linus Chiu DPM FACFAS FACFAOM  Podiatric Foot & Ankle Surgeon  [facility]  [phone]

## 2021-01-22 NOTE — ANESTHESIA CARE TRANSFER NOTE
Patient: Anders Renlund    Procedure(s):  LAPAROSCOPIC SUPRACERVICAL HYSTERECTOMY  LAPAROSCOPIC BILATERAL SALPINGECTOMY  DIAGNOSTIC CYSTOSCOPY    Diagnosis: Menorrhagia [N92.0]  Diagnosis Additional Information: No value filed.    Anesthesia Type:   General, ETT     Note:  Airway :Face Mask  Patient transferred to:PACU  Comments: Patient awake, extubated, and transferred to PACU. VS stable and report given to RN. Handoff Report: Identifed the Patient, Identified the Reponsible Provider, Reviewed the pertinent medical history, Discussed the surgical course, Reviewed Intra-OP anesthesia mangement and issues during anesthesia, Set expectations for post-procedure period and Allowed opportunity for questions and acknowledgement of understanding      Vitals: (Last set prior to Anesthesia Care Transfer)    CRNA VITALS  11/13/2019 1016 - 11/13/2019 1054      11/13/2019             Pulse:  100    SpO2:  99 %    Resp Rate (set):  10                Electronically Signed By: LILLY Mccabe CRNA  November 13, 2019  10:54 AM   RN notified Dr. Marisel Oliver of ABG results. I decreased fio2 to 60% post ABG. Unable to increase VE due to high PIP. Waiting for return call from Dr. Marisel Oliver.

## 2021-02-08 ENCOUNTER — OFFICE VISIT (OUTPATIENT)
Dept: PODIATRY | Facility: CLINIC | Age: 41
End: 2021-02-08
Payer: COMMERCIAL

## 2021-02-08 VITALS
SYSTOLIC BLOOD PRESSURE: 130 MMHG | WEIGHT: 266 LBS | DIASTOLIC BLOOD PRESSURE: 80 MMHG | BODY MASS INDEX: 44.32 KG/M2 | HEIGHT: 65 IN

## 2021-02-08 DIAGNOSIS — M25.551 RIGHT HIP PAIN: Primary | ICD-10-CM

## 2021-02-08 DIAGNOSIS — M25.571 SINUS TARSI SYNDROME OF BOTH ANKLES: ICD-10-CM

## 2021-02-08 DIAGNOSIS — M25.572 SINUS TARSI SYNDROME OF BOTH ANKLES: ICD-10-CM

## 2021-02-08 PROCEDURE — 99214 OFFICE O/P EST MOD 30 MIN: CPT | Mod: 25 | Performed by: PODIATRIST

## 2021-02-08 PROCEDURE — 20605 DRAIN/INJ JOINT/BURSA W/O US: CPT | Mod: 50 | Performed by: PODIATRIST

## 2021-02-08 RX ADMIN — TRIAMCINOLONE ACETONIDE 40 MG: 40 INJECTION, SUSPENSION INTRA-ARTICULAR; INTRAMUSCULAR at 13:59

## 2021-02-08 RX ADMIN — LIDOCAINE HYDROCHLORIDE 0.5 ML: 10 INJECTION, SOLUTION INFILTRATION; PERINEURAL at 13:59

## 2021-02-08 ASSESSMENT — MIFFLIN-ST. JEOR: SCORE: 1864.51

## 2021-02-08 NOTE — PATIENT INSTRUCTIONS
Please keep an eye out on how these injections help and call or send a MyChart message in 4 weeks if symptoms have not improved to proceed with MRI

## 2021-02-08 NOTE — PROGRESS NOTES
"Foot & Ankle Surgery   February 8, 2021    S:  Pt is seen today for evaluation of bilateral lower extremity pain.  1/18/21, underwent dx/tx bilateral ankle joint injections in setting of bilateral sinus tarsi syndrome as well.  States a good majority of her bilateral ankle pain resolved, but she continued to have lateral ankle pain.   Ankle injections \"still working\".   She also mentions right hip/groin pain.  May have \"pulled a muscle\".  States it feels \"weird, wrong\", like when \"your foot falls asleep\".     Vitals:    02/08/21 1329   BP: 130/80   Weight: 120.7 kg (266 lb)   Height: 1.638 m (5' 4.5\")   '      ROS - Pos for CC.  Patient denies current nausea, vomiting, chills, fevers, belly pain, calf pain, chest pain or SOB.  Complete remainder of ROS it otherwise neg.      PE:  Gen:   No apparent distress  Eye:    Visual scanning without deficit  Ear:    Response to auditory stimuli wnl  Lung:    Non-labored breathing on RA noted  Abd:    NTND per patient report  Lymph:    Neg for pitting/non-pitting edema BLE  Vasc:    Pulses palpable, CFT minimally delayed  Neuro:    Light touch sensation intact to all sensory nerve distributions without paresthesias  Derm:    Neg for nodules, lesions or ulcerations  MSK:   Bilateral lower extremity ankle joint, including medial recess and anterior gutter, improved from last visit.  Continued sinus tarsi pain without obvious STJ crepitus/pain  Calf:    Neg for redness, swelling or tenderness    Assessment:  40 year old female with continued bilateral lower extremity pain, including ankle joint and sinus tarsi pain, sp bilateral ankle joint steroid injections      Plan:  Discussed etiologies, anatomy and options  1.  continued bilateral lower extremity pain, including ankle joint and sinus tarsi pain, sp bilateral ankle joint steroid injections  -doing well with ankle joint injections.  This has alleviated much of her pain, but the sinus tarsi is still very tender  -dx/tx sinus " tarsi injections, see procedure note.  Monitor %, location, duration of relief  -when the shots wear off and if the symptoms come back bad enough to warrant futher treatment, advised she call/MyChart for bilateral ankle MRIs  -anticipate ankle joint surgery, including scope, but will assess for OCD.  Wouldn't likely see significant improvement with injection if main issue is OCD rather than intra-articular pathology, but will assess  -sinus tarsi evacuation vs STJ fusion based on MRI, unless injection resolves sinus tarsi syndrome  -Sports Med referral for right hip/groin pain.    Medium Joint Injection/Arthrocentesis: bilateral ankle    Date/Time: 2/8/2021 1:59 PM  Performed by: Linus Chiu DPM  Authorized by: Linus Chiu DPM     Indications:  Diagnostic evaluation and pain  Needle Size:  25 G  Guidance: surface landmarks    Approach:  Dorsal  Location:  Ankle  Laterality:  Bilateral  Site:  Bilateral ankle  Medications (Right):  40 mg triamcinolone 40 MG/ML; 0.5 mL lidocaine 1 %  Medications (Left):  40 mg triamcinolone 40 MG/ML; 0.5 mL lidocaine 1 %   After obtaining written consent, the joint was identified and the skin was prepped with alcohol and betadine.  The joint was distracted and the needle was advance to the underlying sinus tarsi/subtalar joint bilateral.  1 1/2cc mixture of 2:1 kenalog 40:1% lidocaine plain was injected.  The patient tolerated the procedure without complication.  Risks that were discussed included possible joint/cartilage damage, infection, pigment change, steroid flare.             Follow up:  Prn based on injection results or sooner with acute issues           Linus Chiu DPM FACFAS FACFAOM  Podiatric Foot & Ankle Surgeon  [facility]  [phone]

## 2021-02-09 ENCOUNTER — TELEPHONE (OUTPATIENT)
Dept: PODIATRY | Facility: CLINIC | Age: 41
End: 2021-02-09

## 2021-02-09 NOTE — TELEPHONE ENCOUNTER
Patient had bilateral ankle injections yesterday. She is experiencing pain in one of the ankles and would like to speak with someone about it.    She can be reached at: [phone]

## 2021-02-09 NOTE — TELEPHONE ENCOUNTER
Discussed with Dr. Chiu. Patient may be having a steroid flare. He recommends she ice, elevate and can take ibuprofen. If not improving by tomorrow or the next day, patient should be seen in clinic.     Phone call to patient and informed of recommendations above. She verbalized understanding and states she has an appointment with Dr. Yeo tomorrow. If not improving recommended she bring up at her appointment.     YVETTE Wilson RN

## 2021-02-09 NOTE — TELEPHONE ENCOUNTER
Phone call to patient.   She states her left ankle is a little sore from injection yesterday, but otherwise is doing ok.     Her right ankle is very painful since the cortisone injection yesterday. Last night pain was +8/10 and it woke her up in the night. 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   She plans on staying home from work today due to pain because she is required to do a lot of walking at work.   Pain is located at the injection site.   Denies redness, but reports some mild swelling. She is unsure if there is bruising due to several veins she has in that area.     She asks if this is normal or if she needs to come in to be seen. Discussed that everyone reacts differently to injections. Most likely he will continue to have her monitor, ice, elevate and take ibuprofen.   She is willing to do this if that is the case.   Will discuss with provider and get back with her.     She can be reached at: [phone]  Ok to leave message     YVETTE Wilson RN

## 2021-02-15 ENCOUNTER — OFFICE VISIT (OUTPATIENT)
Dept: ORTHOPEDICS | Facility: CLINIC | Age: 41
End: 2021-02-15
Attending: PODIATRIST
Payer: COMMERCIAL

## 2021-02-15 ENCOUNTER — ANCILLARY PROCEDURE (OUTPATIENT)
Dept: GENERAL RADIOLOGY | Facility: CLINIC | Age: 41
End: 2021-02-15
Attending: STUDENT IN AN ORGANIZED HEALTH CARE EDUCATION/TRAINING PROGRAM
Payer: COMMERCIAL

## 2021-02-15 VITALS
DIASTOLIC BLOOD PRESSURE: 98 MMHG | WEIGHT: 266 LBS | HEIGHT: 65 IN | BODY MASS INDEX: 44.32 KG/M2 | SYSTOLIC BLOOD PRESSURE: 148 MMHG

## 2021-02-15 DIAGNOSIS — M25.552 GREATER TROCHANTERIC PAIN SYNDROME OF LEFT LOWER EXTREMITY: Primary | ICD-10-CM

## 2021-02-15 DIAGNOSIS — M25.551 RIGHT HIP PAIN: ICD-10-CM

## 2021-02-15 PROCEDURE — 73502 X-RAY EXAM HIP UNI 2-3 VIEWS: CPT | Mod: RT | Performed by: RADIOLOGY

## 2021-02-15 PROCEDURE — 99214 OFFICE O/P EST MOD 30 MIN: CPT | Performed by: STUDENT IN AN ORGANIZED HEALTH CARE EDUCATION/TRAINING PROGRAM

## 2021-02-15 ASSESSMENT — MIFFLIN-ST. JEOR: SCORE: 1864.51

## 2021-02-15 NOTE — LETTER
2/15/2021         RE: Anders Zepeda  [address]  [facility] [address]        Dear Colleague,    Thank you for referring your patient, Anders Zepeda, to the [facility]. Please see a copy of my visit note below.    ASSESSMENT & PLAN    Anders was seen today for pain.    Diagnoses and all orders for this visit:    Greater trochanteric pain syndrome of left lower extremity  -     PHYSICAL THERAPY REFERRAL (Internal); Future    Right hip pain  -     Orthopedic & Spine  Referral  -     XR Pelvis and Hip Right 2 Views; Future      Left gluteus medius and minimus tendinopathy, trochanteric pain syndrome.  X-rays of hip obtained today and reviewed with patient, no acute injury or DJD demonstrated.  We discussed the nature of this condition and available treatment options.  Patient will begin physical therapy.  If pain limits participation in therapy, we can consider one-time CSI but patient understands reasons to try to avoid.  She will update us on her progress over the next few weeks via [employer], sooner as needed.    Blood pressure noted to be elevated today.  Patient has home blood pressure cuff and will continue monitoring her BP and notify PCP if elevated.    Derrek Avila MD  [facility]    -----  Chief Complaint   Patient presents with     Right Hip - Pain       SUBJECTIVE  Anders Zepeda is a/an 40 year old female who is seen in consultation at the request of  Linus Chiu DPM for evaluation of  Right groin pain.     The patient is seen by themselves.    Date of Onset: 11 months ago, worsening for past 2 weeks without new injury. Patient describes injury as she was walking her dog when she slipped on the ice and fell. She notes having pain in the right hip the following day.  Location of Pain: right lateral hip with pain radiating into right groin, patient also notes pain occasionally radiates into  "right sided low back  Worsened by: standing, walking / stepping onto right leg, sleeping on left side  Better with: sleeping on right side with straight leg  Treatments tried: topical analgesics, Tylenol, ibuprofen, home exercises (from previous HEP from ankle injury)  Associated symptoms: bruising over right lateral hip thigh initially after fall; tingling in right lateral hip and right groin, feeling of instability or weakness of right leg    Orthopedic/Surgical history: YES - chronic bilateral ankle pain  Social History/Occupation: works as an  at an [employer] plant    No family history pertinent to patient's problem today.      OBJECTIVE:  BP (!) 148/98   Ht 1.638 m (5' 4.5\")   Wt 120.7 kg (266 lb)   LMP 10/17/2019   BMI 44.95 kg/m     General: healthy, alert and in no distress  HEENT: no scleral icterus or conjunctival erythema  Skin: no visible suspicious lesions or rashes.  Resp: normal respiratory effort without conversational dyspnea   Psych: normal mood and affect  Gait: normal    MSK:   Full and symmetric hip flexion, IR, ER, with only mild pain with ER  Negative log roll  Focal tenderness over greater trochanter area  4/5 strength in side-lying hip abduction  CMS intact distally    RADIOLOGY:  Xr Pelvis And Hip Right 2 Views    Result Date: 2/15/2021  XR PELVIS AND HIP RIGHT 2 VIEWS 2/15/2021 11:36 AM HISTORY: Right hip pain     IMPRESSION: Negative exam. ARLENE NGUYEN MD    Xr Ankle Bilateral G/e 3 Views    Result Date: 1/18/2021  ANKLE BILATERAL THREE OR MORE VIEWS   1/18/2021 1:48 PM HISTORY: Chronic ankle pain; previous fibular surgery. Chronic pain of both ankles. COMPARISON: Left ankle x-ray from 8/8/2019, right ankle x-ray from 3/7/2019.     IMPRESSION: Right: No evidence of fracture. Mild spurring tip of the lateral malleolus. Mortise and talar dome are intact. Left: No change in ORIF of distal fibula. Mortise and talar dome are intact. No acute fracture. ADELAIDA SUBRAMANIAN MD     35 " minutes spent on the date of the encounter doing chart review, history and exam, documentation and further activities as noted above             Again, thank you for allowing me to participate in the care of your patient.        Sincerely,        Derrek Avila MD

## 2021-02-15 NOTE — PROGRESS NOTES
ASSESSMENT & PLAN    Anders was seen today for pain.    Diagnoses and all orders for this visit:    Greater trochanteric pain syndrome of left lower extremity  -     PHYSICAL THERAPY REFERRAL (Internal); Future    Right hip pain  -     Orthopedic & Spine  Referral  -     XR Pelvis and Hip Right 2 Views; Future      Left gluteus medius and minimus tendinopathy, trochanteric pain syndrome.  X-rays of hip obtained today and reviewed with patient, no acute injury or DJD demonstrated.  We discussed the nature of this condition and available treatment options.  Patient will begin physical therapy.  If pain limits participation in therapy, we can consider one-time CSI but patient understands reasons to try to avoid.  She will update us on her progress over the next few weeks via [employer], sooner as needed.    Blood pressure noted to be elevated today.  Patient has home blood pressure cuff and will continue monitoring her BP and notify PCP if elevated.    Derrek Avila MD  [facility] [place]    -----  Chief Complaint   Patient presents with     Right Hip - Pain       SUBJECTIVE  Anders Zepeda is a/an 40 year old female who is seen in consultation at the request of  Linus Chiu DPM for evaluation of  Right groin pain.     The patient is seen by themselves.    Date of Onset: 11 months ago, worsening for past 2 weeks without new injury. Patient describes injury as she was walking her dog when she slipped on the ice and fell. She notes having pain in the right hip the following day.  Location of Pain: right lateral hip with pain radiating into right groin, patient also notes pain occasionally radiates into right sided low back  Worsened by: standing, walking / stepping onto right leg, sleeping on left side  Better with: sleeping on right side with straight leg  Treatments tried: topical analgesics, Tylenol, ibuprofen, home exercises (from previous HEP from ankle  "injury)  Associated symptoms: bruising over right lateral hip thigh initially after fall; tingling in right lateral hip and right groin, feeling of instability or weakness of right leg    Orthopedic/Surgical history: YES - chronic bilateral ankle pain  Social 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      OBJECTIVE:  BP (!) 148/98   Ht 1.638 m (5' 4.5\")   Wt 120.7 kg (266 lb)   LMP 10/17/2019   BMI 44.95 kg/m     General: healthy, alert and in no distress  HEENT: no scleral icterus or conjunctival erythema  Skin: no visible suspicious lesions or rashes.  Resp: normal respiratory effort without conversational dyspnea   Psych: normal mood and affect  Gait: normal    MSK:   Full and symmetric hip flexion, IR, ER, with only mild pain with ER  Negative log roll  Focal tenderness over greater trochanter area  4/5 strength in side-lying hip abduction  CMS intact distally    RADIOLOGY:  Xr Pelvis And Hip Right 2 Views    Result Date: 2/15/2021  XR PELVIS AND HIP RIGHT 2 VIEWS 2/15/2021 11:36 AM HISTORY: Right hip pain     IMPRESSION: Negative exam. ARLENE NGUYEN MD    Xr Ankle Bilateral G/e 3 Views    Result Date: 1/18/2021  ANKLE BILATERAL THREE OR MORE VIEWS   1/18/2021 1:48 PM HISTORY: Chronic ankle pain; previous fibular surgery. Chronic pain of both ankles. COMPARISON: Left ankle x-ray from 8/8/2019, right ankle x-ray from 3/7/2019.     IMPRESSION: Right: No evidence of fracture. Mild spurring tip of the lateral malleolus. Mortise and talar dome are intact. Left: No change in ORIF of distal fibula. Mortise and talar dome are intact. No acute fracture. ADELAIDA SUBRAMANIAN MD     35 minutes spent on the date of the encounter doing chart review, history and exam, documentation and further activities as noted above         "

## 2021-02-19 RX ORDER — LIDOCAINE HYDROCHLORIDE 10 MG/ML
0.5 INJECTION, SOLUTION INFILTRATION; PERINEURAL
Status: SHIPPED | OUTPATIENT
Start: 2021-02-08

## 2021-02-19 RX ORDER — TRIAMCINOLONE ACETONIDE 40 MG/ML
40 INJECTION, SUSPENSION INTRA-ARTICULAR; INTRAMUSCULAR
Status: SHIPPED | OUTPATIENT
Start: 2021-02-08

## 2021-05-30 ENCOUNTER — RECORDS - HEALTHEAST (OUTPATIENT)
Dept: ADMINISTRATIVE | Facility: CLINIC | Age: 41
End: 2021-05-30

## 2021-08-31 ENCOUNTER — E-VISIT (OUTPATIENT)
Dept: URGENT CARE | Facility: URGENT CARE | Age: 41
End: 2021-08-31
Payer: COMMERCIAL

## 2021-08-31 DIAGNOSIS — M54.9 BACK PAIN, UNSPECIFIED BACK LOCATION, UNSPECIFIED BACK PAIN LATERALITY, UNSPECIFIED CHRONICITY: Primary | ICD-10-CM

## 2021-08-31 PROCEDURE — 99421 OL DIG E/M SVC 5-10 MIN: CPT | Performed by: FAMILY MEDICINE

## 2021-08-31 NOTE — PATIENT INSTRUCTIONS
Dear Anders Zepeda,    We are sorry you are not feeling well. Based on the responses you provided, it is recommended that you be seen in-person in urgent care so we can better evaluate your symptoms. Please click here to find the nearest urgent care location to you.   Thank you for trusting us with your care.    Wilfrid Ospina, DO

## 2021-08-31 NOTE — TELEPHONE ENCOUNTER
Provider E-Visit time total (minutes): 3    Pt needs to be seen Urgent care to rule out kidney infection

## 2021-09-01 ENCOUNTER — LAB (OUTPATIENT)
Dept: LAB | Facility: CLINIC | Age: 41
End: 2021-09-01
Payer: COMMERCIAL

## 2021-09-01 ENCOUNTER — VIRTUAL VISIT (OUTPATIENT)
Dept: OBGYN | Facility: CLINIC | Age: 41
End: 2021-09-01
Payer: COMMERCIAL

## 2021-09-01 DIAGNOSIS — T36.95XA ANTIBIOTIC-INDUCED YEAST INFECTION: ICD-10-CM

## 2021-09-01 DIAGNOSIS — B37.9 ANTIBIOTIC-INDUCED YEAST INFECTION: ICD-10-CM

## 2021-09-01 DIAGNOSIS — R30.0 DYSURIA: ICD-10-CM

## 2021-09-01 DIAGNOSIS — R30.0 DYSURIA: Primary | ICD-10-CM

## 2021-09-01 LAB
ALBUMIN UR-MCNC: NEGATIVE MG/DL
APPEARANCE UR: CLEAR
BILIRUB UR QL STRIP: NEGATIVE
COLOR UR AUTO: YELLOW
GLUCOSE UR STRIP-MCNC: NEGATIVE MG/DL
HGB UR QL STRIP: ABNORMAL
KETONES UR STRIP-MCNC: NEGATIVE MG/DL
LEUKOCYTE ESTERASE UR QL STRIP: NEGATIVE
NITRATE UR QL: NEGATIVE
PH UR STRIP: 6 [PH] (ref 5–7)
SP GR UR STRIP: 1.01 (ref 1–1.03)
UROBILINOGEN UR STRIP-ACNC: 0.2 E.U./DL

## 2021-09-01 PROCEDURE — 81003 URINALYSIS AUTO W/O SCOPE: CPT

## 2021-09-01 PROCEDURE — 99213 OFFICE O/P EST LOW 20 MIN: CPT | Mod: 95 | Performed by: NURSE PRACTITIONER

## 2021-09-01 PROCEDURE — 87086 URINE CULTURE/COLONY COUNT: CPT

## 2021-09-01 RX ORDER — CEPHALEXIN 500 MG/1
500 CAPSULE ORAL 2 TIMES DAILY
Qty: 14 CAPSULE | Refills: 0 | Status: SHIPPED | OUTPATIENT
Start: 2021-09-01 | End: 2022-10-18

## 2021-09-01 RX ORDER — FLUCONAZOLE 150 MG/1
150 TABLET ORAL
Qty: 2 TABLET | Refills: 0 | Status: SHIPPED | OUTPATIENT
Start: 2021-09-01 | End: 2022-10-18

## 2021-09-01 NOTE — PROGRESS NOTES
Anders Zepeda is a 41 year old female who is being evaluated via a billable telephone visit.      What phone number would you like to be contacted at? [phone]   How would you like to obtain your AVS? Jolanta      SUBJECTIVE:                                                   Anders Zepeda is a 41 year old female who presents for virtual visit today for the following health issue(s):  Patient presents with:  UTI: pt states she had some abdominal pain that started a week ago, couple days later started to have painful urination, denies urgency/frequency. Now the abdominal pain does not seem as bad. Decided not to come in as she has a very sore throat that started 3-4 days ago.      HPI:  Phone call  Started as low abdominal pain-twinge to sharp pain  Dysuria consistently every void-back pain with prolonging need to void  Avoided in office visit due to sore throat- no cough  + covid vaccine.     Patient's last menstrual period was 10/17/2019..     Patient is sexually active, .  Using hysterectomy for contraception.    reports that she has been smoking cigarettes. She has a 25.00 pack-year smoking history. She has never used smokeless tobacco.  Tobacco Cessation Action Plan: Self help information given to patient    Health maintenance updated:  Needs to schedule for annual exam and mammogram    Today's PHQ-2 Score:   PHQ-2 (  [employer]) 2021   Q1: Little interest or pleasure in doing things 0   Q2: Feeling down, depressed or hopeless 0   PHQ-2 Score 0   Q1: Little interest or pleasure in doing things -   Q2: Feeling down, depressed or hopeless -   PHQ-2 Score -     Today's PHQ-9 Score:   PHQ-9 SCORE 10/31/2019   PHQ-9 Total Score SyedUrbanna -   PHQ-9 Total Score 2     Today's BRADY-7 Score:   BRADY-7 SCORE 10/31/2019   Total Score -   Total Score 2       Problem list and histories reviewed & adjusted, as indicated.  Additional history: as documented.    Patient Active Problem List   Diagnosis     Morbid  obesity (H)     Cough     Post-nasal drip     Nasal congestion     Sinus pressure     Medication side effects     Bipolar 2 disorder (H)     Primary insomnia     Iron deficiency anemia due to chronic blood loss     Side effect of medication     Menorrhagia     Vitamin D deficiency     Past Surgical History:   Procedure Laterality Date     ABDOMEN SURGERY      c section     ARTHROSCOPY ANKLE, OPEN REPAIR TENDON, COMBINED Left 2019    Procedure: 1.  Ankle arthroscopy with extensive synovectomy, left lower extremity.  2.  Peroneus brevis debridement, repair, left lower extremity.  3.  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  4.  Open reduction and internal fixation, left distal fibular fracture nonunion.  5.  Stressing of left ankle under anesthesia.   ;  Surgeon: Linus Chiu DPM;  Location: [facility] OR      SECTION  1998      SECTION  2000     CYSTOSCOPY N/A 2019    Procedure: DIAGNOSTIC CYSTOSCOPY;  Surgeon: Akash Acosta MD;  Location: [facility] OR     HERNIA REPAIR       LAPAROSCOPIC HYSTERECTOMY SUPRACERVICAL N/A 2019    Procedure: LAPAROSCOPIC SUPRACERVICAL HYSTERECTOMY;  Surgeon: Akash Acosta MD;  Location: [facility] OR     LAPAROSCOPIC SALPINGECTOMY Bilateral 2019    Procedure: LAPAROSCOPIC BILATERAL SALPINGECTOMY;  Surgeon: Akash Acosta MD;  Location: [facility] OR     OPEN REDUCTION INTERNAL FIXATION ANKLE Left 2019    Procedure: Open reduction internal fixation ankle;  Surgeon: Linus Chiu DPM;  Location: [facility] OR     TUBAL LIGATION        Social History     Tobacco Use     Smoking status: Current Every Day Smoker     Packs/day: 1.00     Years: 25.00     Pack years: 25.00     Types: Cigarettes     Smokeless tobacco: Never Used   Substance Use Topics     Alcohol use: Yes     Comment: 2 drinks per month      Problem (# of Occurrences) Relation (Name,Age of Onset)    Family History Negative (1) Father    Mental Illness (1) Mother  (Yadira): Bipolar disorder    Uterine Cancer (1) Mother (Yadira)            Current Outpatient Medications   Medication Sig     Acetaminophen (TYLENOL PO)      ASPIRIN PO      IBUPROFEN PO      OLANZapine (ZYPREXA) 5 MG tablet Take 1 tablet (5 mg) by mouth nightly as needed (insomnia)     vitamin D3 (CHOLECALCIFEROL) 400 units capsule Take 1 capsule (400 Units) by mouth daily     Current Facility-Administered Medications   Medication     lidocaine 1 % injection 0.5 mL     lidocaine 1 % injection 0.5 mL     lidocaine 1 % injection 0.5 mL     lidocaine 1 % injection 0.5 mL     triamcinolone (KENALOG-40) injection 40 mg     triamcinolone (KENALOG-40) injection 40 mg     triamcinolone (KENALOG-40) injection 40 mg     triamcinolone (KENALOG-40) injection 40 mg     Allergies   Allergen Reactions     No Known Allergies          OBJECTIVE:     No vitals were obtained today due to virtual visit.    Physical Exam  GENERAL: Healthy, alert and no distress  RESP: No audible wheeze, cough, or visible cyanosis.  No visible retractions or increased work of breathing.    SKIN: Visible skin clear. No significant rash, abnormal pigmentation or lesions.  NEURO: Cranial nerves grossly intact.  Mentation and speech appropriate for age.  PSYCH: Mentation appears normal, affect normal/bright, judgement and insight intact, normal speech and appearance well-groomed.          ASSESSMENT/PLAN:                                                      Phone call duration: 5 minutes      ICD-10-CM    1. Dysuria  R30.0 UA without Microscopic - lab collect     Urine Culture Aerobic Bacterial - lab collect       Patient Instructions   [employer] is for any [place] looking for free support to quit smoking, vaping or chewing.   [employer] will offer many quit support options and resources so [place] residents can continue to find the way to quit that works best for them.   Free support includes personalized coaching, email and text support,  educational materials, and quit medication (nicotine patches, gum or lozenges) delivered by mail.     Contact [employer] at 0-800-QUIT-NOW or online at [email or web address] to receive support on your quit journey.      ?UTI vs pyelo  Encouraged her to leave UA today for testing.   Will treat if +  Will also send UC regardless    LILLY Taveras CNP  [facility] [place]

## 2021-09-01 NOTE — PATIENT INSTRUCTIONS
[employer] is for any [facility] looking for free support to quit smoking, vaping or chewing.   [employer] will offer many quit support options and resources so [place] residents can continue to find the way to quit that works best for them.   Free support includes personalized coaching, email and text support, educational materials, and quit medication (nicotine patches, gum or lozenges) delivered by mail.     Contact [employer] at 1-800-QUIT-NOW or online at [employer] to receive support on your quit journey.

## 2021-09-02 LAB — BACTERIA UR CULT: NORMAL

## 2021-10-23 ENCOUNTER — HEALTH MAINTENANCE LETTER (OUTPATIENT)
Age: 41
End: 2021-10-23

## 2022-02-12 ENCOUNTER — HEALTH MAINTENANCE LETTER (OUTPATIENT)
Age: 42
End: 2022-02-12

## 2022-07-19 NOTE — PROGRESS NOTES
Anders is a 42 year old  female who presents for annual exam.     Besides routine health maintenance,  she would like to discuss medications and who to see outside of women's health care for other health concerns.    HPI:  Patient is here for annual exam.  States her PCP told her he couldn't help her and she needed to see a GYN.  She saw here for the management of her bipolar medication.  She states she has been off of that medication for last month, has a stressful job and knows she needs to go back on.  Seems very confused as to why she couldn't continue care with PCP>  She states her  is very good about her moods and telling her if things are getting worse with bi polar.  She has a LASH for menorrhagia in 2019 by Dr. Acosta.  Last pap wsa 3 years ago.    The patient's PCP is  Elsa Omalley MD.        GYNECOLOGIC HISTORY:    Patient's last menstrual period was 10/17/2019.    Her current contraception method is: hysterectomy.  She  reports that she has been smoking cigarettes. She has a 25.00 pack-year smoking history. She has never used smokeless tobacco.  Patient is sexually active.  STD testing offered?  Declined  Last PHQ-9 score on record =   PHQ-9 SCORE 2022   PHQ-9 Total Score MyChart -   PHQ-9 Total Score 13     Last GAD7 score on record =   BRADY-7 SCORE 2022   Total Score -   Total Score 14     Alcohol Score = 1    HEALTH MAINTENANCE:  Cholesterol:  Recent Labs   Lab Test 19  1342   CHOL 145   HDL 49*   LDL 79   TRIG 83     Last Mammo: , Result: , Next Mammo: Today   Pap:   Lab Results   Component Value Date    PAP NIL/ HPV- 10/31/2019    Colonoscopy:  N/A, Result: not applicable, Next Colonoscopy: screen age 44  Dexa:  N/A    Health maintenance updated:  yes    HISTORY:  OB History    Para Term  AB Living   5 2 2 0 3 0   SAB IAB Ectopic Multiple Live Births   3 0 0 0 0      # Outcome Date GA Lbr Levi/2nd Weight Sex Delivery Anes PTL Lv   5 Term 2000      -SEC      4 Term 1998     -SEC      3 SAB            2 SAB            1 SAB                Patient Active Problem List   Diagnosis     Morbid obesity (H)     Cough     Post-nasal drip     Nasal congestion     Sinus pressure     Medication side effects     Bipolar 2 disorder (H)     Primary insomnia     Iron deficiency anemia due to chronic blood loss     Side effect of medication     Menorrhagia     Vitamin D deficiency     Past Surgical History:   Procedure Laterality Date     ABDOMEN SURGERY      c section     ARTHROSCOPY ANKLE, OPEN REPAIR TENDON, COMBINED Left 2019    Procedure: 1.  Ankle arthroscopy with extensive synovectomy, left lower extremity.  2.  Peroneus brevis debridement, repair, left lower extremity.  3.  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  4.  Open reduction and internal fixation, left distal fibular fracture nonunion.  5.  Stressing of left ankle under anesthesia.   ;  Surgeon: Linus Chiu DPM;  Location: [facility]      SECTION  1998      SECTION  2000     CYSTOSCOPY N/A 2019    Procedure: DIAGNOSTIC CYSTOSCOPY;  Surgeon: Akash Acosta MD;  Location: [facility]     HERNIA REPAIR       LAPAROSCOPIC HYSTERECTOMY SUPRACERVICAL N/A 2019    Procedure: LAPAROSCOPIC SUPRACERVICAL HYSTERECTOMY;  Surgeon: Akash Acosta MD;  Location: [facility]     LAPAROSCOPIC SALPINGECTOMY Bilateral 2019    Procedure: LAPAROSCOPIC BILATERAL SALPINGECTOMY;  Surgeon: Akash Acosta MD;  Location: [facility]     OPEN REDUCTION INTERNAL FIXATION ANKLE Left 2019    Procedure: Open reduction internal fixation ankle;  Surgeon: Linus Chiu DPM;  Location: [facility] OR     TUBAL LIGATION        Social History     Tobacco Use     Smoking status: Current Every Day Smoker     Packs/day: 1.00     Years: 25.00     Pack years: 25.00     Types: Cigarettes     Smokeless tobacco: Never Used   Substance Use Topics     Alcohol use: Yes     " Comment: 2 drinks per month      Problem (# of Occurrences) Relation (Name,Age of Onset)    Family History Negative (1) Father    Mental Illness (1) Mother (Yadira): Bipolar disorder    Uterine Cancer (1) Mother (Yadira)            Current Outpatient Medications   Medication Sig     Acetaminophen (TYLENOL PO)      ASPIRIN PO      IBUPROFEN PO      OLANZapine (ZYPREXA) 5 MG tablet Take 1 tablet (5 mg) by mouth nightly as needed (insomnia)     vitamin D3 (CHOLECALCIFEROL) 400 units capsule Take 1 capsule (400 Units) by mouth daily     cephALEXin (KEFLEX) 500 MG capsule Take 1 capsule (500 mg) by mouth 2 times daily (Patient not taking: Reported on 7/21/2022)     fluconazole (DIFLUCAN) 150 MG tablet Take 1 tablet (150 mg) by mouth every 3 days (Patient not taking: Reported on 7/21/2022)     Current Facility-Administered Medications   Medication     lidocaine 1 % injection 0.5 mL     lidocaine 1 % injection 0.5 mL     lidocaine 1 % injection 0.5 mL     lidocaine 1 % injection 0.5 mL     triamcinolone (KENALOG-40) injection 40 mg     triamcinolone (KENALOG-40) injection 40 mg     triamcinolone (KENALOG-40) injection 40 mg     triamcinolone (KENALOG-40) injection 40 mg     Allergies   Allergen Reactions     No Known Allergies        Past medical, surgical, social and family histories were reviewed and updated in EPIC.    ROS:   12 point review of systems negative other than symptoms noted below or in the HPI.  No urinary frequency or dysuria, bladder or kidney problems    EXAM:  /80   Ht 1.626 m (5' 4\")   Wt 115.8 kg (255 lb 6.4 oz)   LMP 10/17/2019   Breastfeeding No   BMI 43.84 kg/m     BMI: Body mass index is 43.84 kg/m .    PHYSICAL EXAM:  Constitutional:   Appearance: Well nourished, well developed, alert, in no acute distress  Neck:  Lymph Nodes:  No lymphadenopathy present    Thyroid:  Gland size normal, nontender, no nodules or masses present  on palpation  Chest:  Respiratory Effort:  Breathing " unlabored  Cardiovascular:    Heart: Auscultation:  Regular rate, normal rhythm, no murmurs present  Breasts: Inspection of Breasts:  No lymphadenopathy present., Palpation of Breasts and Axillae:  No masses present on palpation, no breast tenderness., Axillary Lymph Nodes:  No lymphadenopathy present. and No nodularity, asymmetry or nipple discharge bilaterally.  Gastrointestinal:   Abdominal Examination:  Abdomen nontender to palpation, tone normal without rigidity or guarding, no masses present, umbilicus without lesions   Liver and Spleen:  No hepatomegaly present, liver nontender to palpation    Hernias:  No hernias present  Lymphatic: Lymph Nodes:  No other lymphadenopathy present  Skin:  General Inspection:  No rashes present, no lesions present, no areas of  discoloration  Neurologic:    Mental Status:  Oriented X3.  Normal strength and tone, sensory exam                grossly normal, mentation intact and speech normal.    Psychiatric:   Mentation appears normal and affect normal/bright.         Pelvic Exam:  External Genitalia:     Normal appearance for age, no discharge present, no tenderness present, no inflammatory lesions present, color normal  Vagina:     Normal vaginal vault without central or paravaginal defects, no discharge present, no inflammatory lesions present, no masses present  Bladder:     Nontender to palpation  Urethra:   Urethral Body:  Urethra palpation normal, urethra structural support normal   Urethral Meatus:  No erythema or lesions present  Cervix:     Appearance healthy, no lesions present, nontender to palpation, no bleeding present  Uterus:     Surgically absent  Adnexa:     No adnexal tenderness present, no adnexal masses present  Perineum:     Perineum within normal limits, no evidence of trauma, no rashes or skin lesions present  Anus:     Anus within normal limits, no hemorrhoids present  Inguinal Lymph Nodes:     No lymphadenopathy present  Pubic Hair:     Normal pubic hair  distribution for age  Genitalia and Groin:     No rashes present, no lesions present, no areas of discoloration, no masses present      COUNSELING:   Reviewed preventive health counseling, as reflected in patient instructions       Regular exercise       Healthy diet/nutrition       (Emily)menopause management    BMI: Body mass index is 43.84 kg/m .  Weight management plan: Discussed healthy diet and exercise guidelines    ASSESSMENT:  42 year old female with satisfactory annual exam.    ICD-10-CM    1. Encounter for gynecological examination without abnormal finding  Z01.419    2. Screening for cervical cancer  Z12.4 Pap thin layer screen with HPV - recommended age 30 - 65 years   3. Bipolar 2 disorder (H)  F31.81 OLANZapine (ZYPREXA) 5 MG tablet       PLAN:  Normal Gyn exam.  Discussed will fill Zyprexa for 90 days, but recommendations given for PCP to further monitor this medication in case of changes needs.  Patient understands and will find a new PCP.  Return prn or 1 year for annual.  Pap every 3 years if normal.    LILLY Cadena CNP

## 2022-07-21 ENCOUNTER — OFFICE VISIT (OUTPATIENT)
Dept: OBGYN | Facility: CLINIC | Age: 42
End: 2022-07-21
Payer: COMMERCIAL

## 2022-07-21 ENCOUNTER — ANCILLARY PROCEDURE (OUTPATIENT)
Dept: MAMMOGRAPHY | Facility: CLINIC | Age: 42
End: 2022-07-21
Payer: COMMERCIAL

## 2022-07-21 VITALS
BODY MASS INDEX: 43.6 KG/M2 | HEIGHT: 64 IN | SYSTOLIC BLOOD PRESSURE: 128 MMHG | DIASTOLIC BLOOD PRESSURE: 80 MMHG | WEIGHT: 255.4 LBS

## 2022-07-21 DIAGNOSIS — Z12.31 VISIT FOR SCREENING MAMMOGRAM: ICD-10-CM

## 2022-07-21 DIAGNOSIS — F31.81 BIPOLAR 2 DISORDER (H): ICD-10-CM

## 2022-07-21 DIAGNOSIS — Z12.4 SCREENING FOR CERVICAL CANCER: ICD-10-CM

## 2022-07-21 DIAGNOSIS — Z01.419 ENCOUNTER FOR GYNECOLOGICAL EXAMINATION WITHOUT ABNORMAL FINDING: Primary | ICD-10-CM

## 2022-07-21 PROCEDURE — G0145 SCR C/V CYTO,THINLAYER,RESCR: HCPCS | Performed by: NURSE PRACTITIONER

## 2022-07-21 PROCEDURE — 99396 PREV VISIT EST AGE 40-64: CPT | Performed by: NURSE PRACTITIONER

## 2022-07-21 PROCEDURE — 87624 HPV HI-RISK TYP POOLED RSLT: CPT | Performed by: NURSE PRACTITIONER

## 2022-07-21 PROCEDURE — 77067 SCR MAMMO BI INCL CAD: CPT | Mod: TC | Performed by: RADIOLOGY

## 2022-07-21 RX ORDER — OLANZAPINE 5 MG/1
5 TABLET ORAL
Qty: 90 TABLET | Refills: 0 | Status: SHIPPED | OUTPATIENT
Start: 2022-07-21 | End: 2023-07-18

## 2022-07-21 ASSESSMENT — ANXIETY QUESTIONNAIRES
2. NOT BEING ABLE TO STOP OR CONTROL WORRYING: NEARLY EVERY DAY
5. BEING SO RESTLESS THAT IT IS HARD TO SIT STILL: NOT AT ALL
1. FEELING NERVOUS, ANXIOUS, OR ON EDGE: NEARLY EVERY DAY
3. WORRYING TOO MUCH ABOUT DIFFERENT THINGS: NEARLY EVERY DAY
7. FEELING AFRAID AS IF SOMETHING AWFUL MIGHT HAPPEN: SEVERAL DAYS
GAD7 TOTAL SCORE: 14
IF YOU CHECKED OFF ANY PROBLEMS ON THIS QUESTIONNAIRE, HOW DIFFICULT HAVE THESE PROBLEMS MADE IT FOR YOU TO DO YOUR WORK, TAKE CARE OF THINGS AT HOME, OR GET ALONG WITH OTHER PEOPLE: VERY DIFFICULT
GAD7 TOTAL SCORE: 14
6. BECOMING EASILY ANNOYED OR IRRITABLE: SEVERAL DAYS

## 2022-07-21 ASSESSMENT — PATIENT HEALTH QUESTIONNAIRE - PHQ9
SUM OF ALL RESPONSES TO PHQ QUESTIONS 1-9: 13
5. POOR APPETITE OR OVEREATING: NEARLY EVERY DAY

## 2022-07-27 LAB
BKR LAB AP GYN ADEQUACY: NORMAL
BKR LAB AP GYN INTERPRETATION: NORMAL
BKR LAB AP HPV REFLEX: NORMAL
BKR LAB AP PREVIOUS ABNORMAL: NORMAL
PATH REPORT.COMMENTS IMP SPEC: NORMAL
PATH REPORT.COMMENTS IMP SPEC: NORMAL
PATH REPORT.RELEVANT HX SPEC: NORMAL

## 2022-07-28 LAB
HUMAN PAPILLOMA VIRUS 16 DNA: NEGATIVE
HUMAN PAPILLOMA VIRUS 18 DNA: NEGATIVE
HUMAN PAPILLOMA VIRUS FINAL DIAGNOSIS: NORMAL
HUMAN PAPILLOMA VIRUS OTHER HR: NEGATIVE

## 2022-10-06 ENCOUNTER — HOSPITAL ENCOUNTER (EMERGENCY)
Facility: CLINIC | Age: 42
Discharge: HOME | End: 2022-10-06
Attending: EMERGENCY MEDICINE | Admitting: EMERGENCY MEDICINE
Payer: COMMERCIAL

## 2022-10-06 VITALS
TEMPERATURE: 98.7 F | RESPIRATION RATE: 18 BRPM | SYSTOLIC BLOOD PRESSURE: 129 MMHG | HEART RATE: 64 BPM | BODY MASS INDEX: 43.22 KG/M2 | OXYGEN SATURATION: 99 % | WEIGHT: 251.8 LBS | DIASTOLIC BLOOD PRESSURE: 87 MMHG

## 2022-10-06 DIAGNOSIS — L42 PITYRIASIS ROSEA: ICD-10-CM

## 2022-10-06 LAB — GLUCOSE BLDC GLUCOMTR-MCNC: 88 MG/DL (ref 70–99)

## 2022-10-06 PROCEDURE — 99283 EMERGENCY DEPT VISIT LOW MDM: CPT

## 2022-10-06 RX ORDER — TRIAMCINOLONE ACETONIDE 1 MG/G
CREAM TOPICAL
Qty: 80 G | Refills: 4 | Status: SHIPPED | OUTPATIENT
Start: 2022-10-06 | End: 2022-10-18

## 2022-10-06 ASSESSMENT — ENCOUNTER SYMPTOMS
DIARRHEA: 0
COUGH: 0
DIFFICULTY URINATING: 0
VOMITING: 0
FEVER: 0

## 2022-10-06 ASSESSMENT — ACTIVITIES OF DAILY LIVING (ADL): ADLS_ACUITY_SCORE: 33

## 2022-10-06 NOTE — DISCHARGE INSTRUCTIONS
Return to the ER for fever, increased rash, blistering or swelling around the mouth, or any new concerns.

## 2022-10-06 NOTE — ED PROVIDER NOTES
History   Chief Complaint:  Rash       HPI   Anders Zepeda is a 42 year old female who presents with a rash. The patient reports onset of a rash on her chest 2 days ago. She states that her back started itching a few days before this but she did not look for a rash until she noticed it on her chest. Over the last few days she also has noticed that her nipples are very painful with any friction or touch. She denies any discharge from her nipples. She has not used any new cosmetic products, lotions, body washes, detergents, foods or environmental triggers.The patient denies tongue or throat problem. No rash to the extremities. No cough, fever, congestion, vomiting, diarrhea or difficulty urinating.     Review of Systems   Constitutional: Negative for fever.   HENT: Negative for congestion.    Respiratory: Negative for cough.    Gastrointestinal: Negative for diarrhea and vomiting.   Genitourinary: Negative for difficulty urinating.   Skin: Positive for rash (Chest and back + itching).   All other systems reviewed and are negative.      Allergies:  The patient has no known allergies.     Medications:  Zyprexa  Aspirin     Past Medical History:     Morbid obesity   Bipolar 2 disorder   Insomnia   Iron deficiency anemia   Menorrhagia   Vitamin D deficiency      Past Surgical History:    C- section   Cystoscopy   Arthroscopy ankle, left   Hernia repair   Hysterectomy   Salpingectomy    Tubal ligation      Family History:    Mother: uterine cancer, bipolar disorder     Social History:  The patient presents to the ED alone  PCP: Elsa Omalley     Physical Exam     Patient Vitals for the past 24 hrs:   BP Temp Temp src Pulse Resp SpO2 Weight   10/06/22 0738 (!) 143/85 98.7  F (37.1  C) Oral 70 18 97 % 114.2 kg (251 lb 12.8 oz)       Physical Exam  Constitutional:       General: She is not in acute distress.     Appearance: She is obese. She is not diaphoretic.   HENT:      Head: Atraumatic.      Right Ear: Tympanic  membrane, ear canal and external ear normal.      Left Ear: Tympanic membrane, ear canal and external ear normal.      Nose: Nose normal.      Mouth/Throat:      Mouth: Mucous membranes are moist.      Pharynx: Oropharynx is clear. No oropharyngeal exudate or posterior oropharyngeal erythema.      Comments: No oral lesions  Eyes:      General: No scleral icterus.     Pupils: Pupils are equal, round, and reactive to light.   Cardiovascular:      Rate and Rhythm: Normal rate and regular rhythm.      Heart sounds: Normal heart sounds.   Pulmonary:      Effort: No respiratory distress.      Breath sounds: Normal breath sounds.   Abdominal:      General: Bowel sounds are normal.      Palpations: Abdomen is soft.      Tenderness: There is no abdominal tenderness.   Musculoskeletal:         General: No tenderness.   Skin:     General: Skin is warm.      Capillary Refill: Capillary refill takes less than 2 seconds.      Comments: The patient has scattered, nonblanching, flaking, oval-shaped, erythematous macules overlying the trunk only but not the extremities.  There appears to be a herald patch over the right upper back.  No blistering or sloughing.  No oral lesions.  No petechia.   Neurological:      General: No focal deficit present.      Mental Status: She is oriented to person, place, and time.   Psychiatric:         Mood and Affect: Mood normal.         Behavior: Behavior normal.           Emergency Department Course   Laboratory:  Labs Ordered and Resulted from Time of ED Arrival to Time of ED Departure   GLUCOSE BY METER - Normal       Result Value    GLUCOSE BY METER POCT 88     GLUCOSE MONITOR NURSING POCT       Emergency Department Course:           Reviewed:  I reviewed nursing notes, vitals, past medical history and Care Everywhere    Assessments:  0809 I obtained history and examined the patient as noted above.   0906 I rechecked the patient and explained findings.     Interventions:  None      Disposition:  The patient was discharged to home.     Impression & Plan   Medical Decision Making:  This patient appears to have pityriasis rosea.  She had pruritus that started over her back where she appears to have a herald patch.  She has an oval-shaped, erythematous, macular rash over the trunk only with somewhat raised margins.  She does not have any specific antecedent viral process that she is able to identify.  Also considered could be a drug reaction although she has not had any recent changes.  Viral exanthem could be considered.  Eczema could be considered.  The patient is overall very well-appearing.  No toxicity or any sign of Cristobal-Ronan syndrome.  No blistering of the skin or sloughing.  No oral lesions.  She is appropriate for outpatient management and will be prescribed triamcinolone cream.  She is to follow-up in 1 week with her primary care clinic and we discussed return precautions.    Diagnosis:    ICD-10-CM    1. Pityriasis rosea  L42        Discharge Medications:  New Prescriptions    TRIAMCINOLONE (KENALOG) 0.1 % EXTERNAL CREAM    Apply to affected areas twice daily.       Scribe Disclosure:  I, Hussain Lin, am serving as a scribe at 8:09 AM on 10/6/2022 to document services personally performed by Magdy Antunez MD based on my observations and the provider's statements to me.            Magdy Antunez MD  10/06/22 7313

## 2022-10-06 NOTE — ED TRIAGE NOTES
Patient states she noticed a rash 2 days ago and is predominantly on her trunk area. Patient states that she hasn't started using any new cleaning or body care products. Patient reports the rash itches.      Triage Assessment     Row Name 10/06/22 0736       Triage Assessment (Adult)    Airway WDL WDL       Respiratory WDL    Respiratory WDL WDL       Skin Circulation/Temperature WDL    Skin Circulation/Temperature WDL WDL       Cardiac WDL    Cardiac WDL WDL       Peripheral/Neurovascular WDL    Peripheral Neurovascular WDL WDL       Cognitive/Neuro/Behavioral WDL    Cognitive/Neuro/Behavioral WDL WDL

## 2022-10-09 ENCOUNTER — HEALTH MAINTENANCE LETTER (OUTPATIENT)
Age: 42
End: 2022-10-09

## 2022-10-18 ENCOUNTER — TELEPHONE (OUTPATIENT)
Dept: FAMILY MEDICINE | Facility: CLINIC | Age: 42
End: 2022-10-18

## 2022-10-18 ENCOUNTER — VIRTUAL VISIT (OUTPATIENT)
Dept: FAMILY MEDICINE | Facility: CLINIC | Age: 42
End: 2022-10-18
Payer: COMMERCIAL

## 2022-10-18 DIAGNOSIS — L42 PITYRIASIS ROSEA: Primary | ICD-10-CM

## 2022-10-18 PROCEDURE — 99214 OFFICE O/P EST MOD 30 MIN: CPT | Mod: GT | Performed by: STUDENT IN AN ORGANIZED HEALTH CARE EDUCATION/TRAINING PROGRAM

## 2022-10-18 RX ORDER — TRIAMCINOLONE ACETONIDE 1 MG/G
CREAM TOPICAL
Qty: 80 G | Refills: 4 | Status: SHIPPED | OUTPATIENT
Start: 2022-10-18 | End: 2023-07-18

## 2022-10-18 ASSESSMENT — PAIN SCALES - GENERAL: PAINLEVEL: NO PAIN (0)

## 2022-10-18 NOTE — PATIENT INSTRUCTIONS
Alex Martin,    Thank you for allowing [facility] to manage your care.    s.    I sent your prescriptions to your pharmacy.  .     For your convenience, test results are released as soon as they are available  Please allow 1-2 business days for me to send you a comment about your results.  If not done so, I encourage you to login into [email or web address] to review your results in real time.     If you have any questions or concerns, please feel free to call us at [phone].    Sincerely,    Dr. Mishra    Did you know?      You can schedule a video visit for follow-up appointments as well as future appointments for certain conditions.  Please see the below link.     [email or web address]    If you have not already done so,  I encourage you to sign up for [email or web address] ([email or web address]).  This will allow you to review your results, securely communicate with a provider, and schedule virtual visits as well.

## 2022-10-18 NOTE — PROGRESS NOTES
Veronica is a 42 year old who is being evaluated via a billable video visit.      How would you like to obtain your AVS? MyChart  If the video visit is dropped, the invitation should be resent by: Text to cell phone: [phone]  Will anyone else be joining your video visit? No        Assessment & Plan     1. Pityriasis rosea    - triamcinolone (KENALOG) 0.1 % external cream; Apply to affected areas twice daily for 2 weeks and as needed thereafter  Dispense: 80 g; Refill: 4      Return in about 2 weeks (around 11/1/2022), or if symptoms worsen or fail to improve, for Follow up, in person.    Alicia Mishra MD  [facility] LUCINDA Martin is a 42 year old, presenting for the following health issues:  Rash      HPI     Rash much better, but not resolved.     ED/UC Followup:    Facility:  [facility]   Date of visit: 10/06/2022  Reason for visit: Pityriasis rosea  Current Status: Today patient reports rash has gotten much better and resolving. She is still using Triamcinolone to areas.         Review of Systems   Constitutional, HEENT, cardiovascular, pulmonary, gi and gu systems are negative, except as otherwise noted.      Objective    Vitals - Patient Reported  Pain Score: No Pain (0)      Vitals:  No vitals were obtained today due to virtual visit.    Physical Exam   GENERAL: Healthy, alert and no distress  EYES: Eyes grossly normal to inspection.  No discharge or erythema, or obvious scleral/conjunctival abnormalities.  RESP: No audible wheeze, cough, or visible cyanosis.  No visible retractions or increased work of breathing.    SKIN:limited exam. No significant rash, abnormal pigmentation or lesions.  NEURO:  Mentation and speech appropriate for age.  PSYCH: Mentation appears normal, affect normal/bright, judgement and insight intact, normal speech and appearance well-groomed.        Video-Visit Details    Video Start Time: 4:27    Type of service:  Video Visit    Video End  Time:4:37 PM    Originating Location (pt. Location): Home      Distant Location (provider location):  On-site    Platform used for Video Visit: [place]

## 2022-10-18 NOTE — TELEPHONE ENCOUNTER
Reason for call:  Other   Patient called regarding (reason for call):   Medication question    Additional comments:   [facility] is asking what specific part of body is the cream being applied to for insurance billing purposes.     Phone number to reach patient:  Other phone number:    [phone] [facility]    Best Time:  any    Can we leave a detailed message on this number?  YES    Travel screening: Not Applicable

## 2022-10-19 NOTE — TELEPHONE ENCOUNTER
Spoke with pharmacyTanja and gave below information. She voiced understanding and agreement.  Eunice Gardner RN  [facility]

## 2022-10-29 NOTE — Clinical Note
Alex Martin saw Anders today.  I ordered an MRI of her ankle and we discussed surgery to repair the fibula, as well as scoping her ankle, as she's fairly tender at the medial and anterior gutter.Mark  Regarding: MU-[id number]-Falling constantly-Body aches.   ----- Message from Mariah Magallon sent at 10/29/2022 12:08 PM CDT -----  .[email or web address]

## 2022-11-09 ENCOUNTER — OFFICE VISIT (OUTPATIENT)
Dept: INTERNAL MEDICINE | Facility: CLINIC | Age: 42
End: 2022-11-09
Payer: COMMERCIAL

## 2022-11-09 VITALS
TEMPERATURE: 97.9 F | WEIGHT: 262 LBS | HEART RATE: 78 BPM | DIASTOLIC BLOOD PRESSURE: 86 MMHG | BODY MASS INDEX: 44.73 KG/M2 | RESPIRATION RATE: 20 BRPM | SYSTOLIC BLOOD PRESSURE: 134 MMHG | OXYGEN SATURATION: 98 % | HEIGHT: 64 IN

## 2022-11-09 DIAGNOSIS — E66.01 MORBID OBESITY (H): ICD-10-CM

## 2022-11-09 DIAGNOSIS — F51.01 PRIMARY INSOMNIA: Primary | ICD-10-CM

## 2022-11-09 DIAGNOSIS — Z23 HIGH PRIORITY FOR 2019-NCOV VACCINE: ICD-10-CM

## 2022-11-09 DIAGNOSIS — L42 PITYRIASIS ROSEA: ICD-10-CM

## 2022-11-09 DIAGNOSIS — Z23 NEED FOR INFLUENZA VACCINATION: ICD-10-CM

## 2022-11-09 DIAGNOSIS — Z23 NEED FOR COVID-19 VACCINE: ICD-10-CM

## 2022-11-09 PROBLEM — T88.7XXA SIDE EFFECT OF MEDICATION: Status: RESOLVED | Noted: 2019-10-01 | Resolved: 2022-11-09

## 2022-11-09 PROBLEM — J34.89 SINUS PRESSURE: Status: RESOLVED | Noted: 2019-06-28 | Resolved: 2022-11-09

## 2022-11-09 PROBLEM — R09.82 POST-NASAL DRIP: Status: RESOLVED | Noted: 2019-06-23 | Resolved: 2022-11-09

## 2022-11-09 PROBLEM — T88.7XXA MEDICATION SIDE EFFECTS: Status: RESOLVED | Noted: 2019-06-28 | Resolved: 2022-11-09

## 2022-11-09 PROBLEM — R09.81 NASAL CONGESTION: Status: RESOLVED | Noted: 2019-06-23 | Resolved: 2022-11-09

## 2022-11-09 PROBLEM — D50.0 IRON DEFICIENCY ANEMIA DUE TO CHRONIC BLOOD LOSS: Status: RESOLVED | Noted: 2019-10-01 | Resolved: 2022-11-09

## 2022-11-09 PROBLEM — R05.9 COUGH: Status: RESOLVED | Noted: 2019-06-23 | Resolved: 2022-11-09

## 2022-11-09 PROCEDURE — 91312 COVID-19,PF,PFIZER BOOSTER BIVALENT: CPT | Performed by: INTERNAL MEDICINE

## 2022-11-09 PROCEDURE — 90686 IIV4 VACC NO PRSV 0.5 ML IM: CPT | Performed by: INTERNAL MEDICINE

## 2022-11-09 PROCEDURE — 99214 OFFICE O/P EST MOD 30 MIN: CPT | Mod: 25 | Performed by: INTERNAL MEDICINE

## 2022-11-09 PROCEDURE — 90471 IMMUNIZATION ADMIN: CPT | Performed by: INTERNAL MEDICINE

## 2022-11-09 PROCEDURE — 0124A COVID-19,PF,PFIZER BOOSTER BIVALENT: CPT | Performed by: INTERNAL MEDICINE

## 2022-11-09 ASSESSMENT — ENCOUNTER SYMPTOMS
NEUROLOGICAL NEGATIVE: 1
MUSCULOSKELETAL NEGATIVE: 1
RESPIRATORY NEGATIVE: 1
CARDIOVASCULAR NEGATIVE: 1
GASTROINTESTINAL NEGATIVE: 1

## 2022-11-09 NOTE — PROGRESS NOTES
Assessment & Plan     Primary insomnia  At this time, patient was reluctant to take the Zyprexa on a more frequent basis due to concerns about effects on her blood sugar.  She did elect to continue using the medication on an as-needed basis.  Side effects of Zyprexa use were discussed.  We also discussed appropriate sleep hygiene techniques.  Patient had no further questions or concerns in this regard.    Pityriasis rosea  He does appear that the pityriasis rosea has resolved, but patient still has some dry flaky skin that has persisted.  This could be related to her persistent use of the topical steroid cream.  I did recommend that she avoid any further use of the topical steroid cream.  I did also recommend a trial of a hypoallergenic, moisturizing lotion to see if this does help resolve her itching and dry skin.    Morbid obesity (H)  Weight loss encouraged.    Need for influenza vaccination  Influenza vaccination administered.    Need for COVID-19 vaccine  Pfizer booster administered.      Review of external notes as documented elsewhere in note  30 minutes spent on the date of the encounter doing chart review, history and exam, documentation and further activities per the note       See Patient Instructions    Return in about 1 week (around 11/16/2022), or if symptoms worsen or fail to improve.    Orville Young MD  [facility] HEIDI Martin is a 42 year old, presenting for the following health issues:  Recheck Medication and Derm Problem (rash)      Patient is a 42-year-old  female who presents to the clinic to discuss medications as well as a recent rash.  Patient was initially seen in the emergency department on October 6, 2022 with a red and pruritic rash located on her back and trunk.  Patient was diagnosed with pityriasis rosea, and she was provided a prescription for triamcinolone cream for the itching.  Patient has been utilizing this cream since her  diagnosis in early October.  She does state that the red rash has resolved, but her skin remains very dry and itchy.  Patient has not used any other medications on her skin.  She did run out of the cream 2 to 3 days prior to presentation.  Patient is also been using Zyprexa 5 mg on an as-needed basis in the evening for insomnia.  She has been utilizing this medication for the past 1 to 2 years.  Patient does take it very infrequently, and she does state that she typically takes it a few times per month.  Patient does find it to be very helpful for her insomnia as she will get approximately 8 to 10 hours after taking the medication.  Patient states that she is reluctant to take it too frequently as she does become more tolerant of the medication and requires a higher dose.    History of Present Illness       Reason for visit:  Follow up/rash, med check    She eats 0-1 servings of fruits and vegetables daily.She consumes 5 sweetened beverage(s) daily.She exercises with enough effort to increase her heart rate 9 or less minutes per day.  She exercises with enough effort to increase her heart rate 3 or less days per week.   She is taking medications regularly.       Review of Systems   HENT: Negative.    Respiratory: Negative.    Cardiovascular: Negative.    Gastrointestinal: Negative.    Genitourinary: Negative.    Musculoskeletal: Negative.    Skin: Negative for rash.   Neurological: Negative.           Objective    LMP 10/17/2019   There is no height or weight on file to calculate BMI.  Physical Exam  Vitals reviewed.   Constitutional:       General: She is not in acute distress.     Appearance: Normal appearance. She is well-developed. She is not ill-appearing.   HENT:      Head: Normocephalic and atraumatic.      Right Ear: Tympanic membrane and external ear normal.      Left Ear: Tympanic membrane and external ear normal.      Nose: Nose normal.      Mouth/Throat:      Mouth: Mucous membranes are moist.       Pharynx: No oropharyngeal exudate.   Eyes:      General:         Right eye: No discharge.         Left eye: No discharge.      Extraocular Movements: Extraocular movements intact.      Conjunctiva/sclera: Conjunctivae normal.   Neck:      Thyroid: No thyromegaly.      Trachea: No tracheal deviation.   Cardiovascular:      Rate and Rhythm: Normal rate and regular rhythm.      Pulses: Normal pulses.      Heart sounds: Normal heart sounds, S1 normal and S2 normal. No murmur heard.    No friction rub. No S3 or S4 sounds.   Pulmonary:      Effort: Pulmonary effort is normal. No respiratory distress.      Breath sounds: Normal breath sounds. No wheezing or rales.   Abdominal:      General: Bowel sounds are normal.      Palpations: Abdomen is soft. There is no mass.      Tenderness: There is no abdominal tenderness.   Musculoskeletal:         General: Normal range of motion.      Cervical back: Neck supple.   Lymphadenopathy:      Cervical: No cervical adenopathy.   Skin:     General: Skin is warm and dry.      Findings: No rash (No rash noted, but patient does appear to have very dry skin along her trunk.).   Neurological:      Mental Status: She is alert and oriented to person, place, and time.      Motor: No abnormal muscle tone.      Deep Tendon Reflexes: Reflexes are normal and symmetric.   Psychiatric:         Behavior: Behavior normal.         Thought Content: Thought content normal.         Judgment: Judgment normal.

## 2022-11-09 NOTE — PATIENT INSTRUCTIONS
Pityriasis rosea appears to have resolved.  We will try moisturizing lotion for dry skin and itch.      Patient will continue her Zyprexa 5 mg on an as-needed basis to assist with insomnia.

## 2023-06-21 ENCOUNTER — PATIENT OUTREACH (OUTPATIENT)
Dept: CARE COORDINATION | Facility: CLINIC | Age: 43
End: 2023-06-21
Payer: COMMERCIAL

## 2023-07-05 ENCOUNTER — PATIENT OUTREACH (OUTPATIENT)
Dept: CARE COORDINATION | Facility: CLINIC | Age: 43
End: 2023-07-05
Payer: COMMERCIAL

## 2023-07-18 ENCOUNTER — MYC REFILL (OUTPATIENT)
Dept: OBGYN | Facility: CLINIC | Age: 43
End: 2023-07-18
Payer: COMMERCIAL

## 2023-07-18 DIAGNOSIS — F31.81 BIPOLAR 2 DISORDER (H): ICD-10-CM

## 2023-07-18 RX ORDER — OLANZAPINE 5 MG/1
5 TABLET ORAL
Qty: 30 TABLET | Refills: 0 | Status: SHIPPED | OUTPATIENT
Start: 2023-07-18

## 2023-07-18 NOTE — TELEPHONE ENCOUNTER
"Requested Prescriptions   Pending Prescriptions Disp Refills     OLANZapine (ZYPREXA) 5 MG tablet 90 tablet 0     Sig: Take 1 tablet (5 mg) by mouth nightly as needed (insomnia)       Antipsychotic Medications Failed - 7/18/2023 12:56 PM        Failed - Lipid panel on file within the past 12 months     Recent Labs   Lab Test 08/09/19  1342   CHOL 145   TRIG 83   HDL 49*   LDL 79   NHDL 96               Failed - A1c or Glucose on file in past 12 months     Recent Labs   Lab Test 10/06/22  0817   GLC 88       Please review patients last 3 weights. If a weight gain of >10 lbs exists, you may refill the prescription once after instructing the patient to schedule an appointment within the next 30 days.    Wt Readings from Last 3 Encounters:   11/09/22 118.8 kg (262 lb)   10/06/22 114.2 kg (251 lb 12.8 oz)   07/21/22 115.8 kg (255 lb 6.4 oz)             Failed - Recent (6 mo) or future (30 days) visit within the authorizing provider's specialty     Patient had office visit in the last 6 months or has a visit in the next 30 days with authorizing provider or within the authorizing provider's specialty.  See \"Patient Info\" tab in inbasket, or \"Choose Columns\" in Meds & Orders section of the refill encounter.            Passed - Blood pressure under 140/90 in past 12 months     BP Readings from Last 3 Encounters:   11/09/22 134/86   10/06/22 129/87   07/21/22 128/80                 Passed - Heart Rate on file within past 12 months     Pulse Readings from Last 3 Encounters:   11/09/22 78   10/06/22 64   01/13/21 64               Passed - Medication is active on med list        Passed - Patient is 18 years of age or older        Passed - Patient is not pregnant        Passed - No positve pregnancy test on file in past 12 months           Last Written Prescription Date:  7/21/22  Last Fill Quantity: 90,  # refills: 0   Last office visit: 7/21/2022 ; last virtual visit: 9/1/2021 with prescribing provider:  FAISAL Carr NP   Future " Office Visit:      Needs annual scheduled. Sent for 1 month    Marika Burrell RN on 7/18/2023 at 1:00 PM    .

## 2023-09-15 NOTE — PROGRESS NOTES
"Anders is a 43 year old  female who presents for annual exam.     Besides routine health maintenance,  she would like to discuss weight management.  HPI:  ***  Menses are {GYN PERIOD REGULARITY:715} and {MENSES DESCRIPTION:270348} lasting {1-10:285410} days.   Menses flow: {MENSTRUAL FLOW:9529879::\"normal\"}.    Patient's last menstrual period was 10/17/2019..   Using hysterectomy for contraception.    She is not currently considering pregnancy.    REPRODUCTIVE/GYNECOLOGIC HISTORY:  Anders is sexually active with male partner(s) and is currently in monogamous relationship.   STI testing offered?  Declined  History of abnormal Pap smear:   reviewed  SOCIAL HISTORY  Abuse: {Physical/Sexual Abuse:063439}  Do you feel safe in your environment? {YES/ NO (recomended):217872}    She  reports that she has been smoking cigarettes. She has a 25.00 pack-year smoking history. She has never used smokeless tobacco.      Last PHQ-9 score on record =       2022     3:02 PM   PHQ-9 SCORE   PHQ-9 Total Score 13     Last GAD7 score on record =       2022     3:02 PM   BRADY-7 SCORE   Total Score 14     Alcohol Score = ***    HEALTH MAINTENANCE:    Overdue       Never  Done ADVANCE CARE PLANNING (Every 5 Years)     Never  Done HEPATITIS B IMMUNIZATION (1 of 3 - 3-dose series)     Never  Done Pneumococcal Vaccine: Pediatrics (0 to 5 Years) and At-Risk Patients (6 to 64 Years) (1 - PCV)     Never  Done HIV SCREENING (Once)     Never  Done HEPATITIS C SCREENING (Once)     2022 DTAP/TDAP/TD IMMUNIZATION (4 - Td or Tdap)  Last completed:  PHQ-2 (once per calendar year) (Yearly, January to December)  Last completed: Oct 18, 2022     JUL 21  2023 YEARLY PREVENTIVE VISIT (Yearly)  Last completed: 2022     SEP 1  2023 INFLUENZA VACCINE (1)  Last completed: 2022         Due Soon       OCT 18  2023 NICOTINE/TOBACCO CESSATION COUNSELING Q 1 YR (Yearly)  Last completed: Oct 18, 2022 "         Upcoming       2025 HPV TEST (Once)  Last completed:  PAP (Every 3 Years)  Last completed: 2022       HEALTHY HABITS  Eating habits: {DIET HABITS:833606}  Calcium/Vitamin D intake: source:  {CALCIUM SOURCES:831393} Adequate?   Exercise: How often do you exercise? {EXERCISE FREQUENCY:490802};{TYPE OF EXERCISE:038357}  Have you had an eye exam in the last two years? {YES/ NO (recomended):580366}  Do you routinely see the dentist once or twice yearly? {YES/ NO (recomended):792105}      HISTORY:  OB History    Para Term  AB Living   5 2 2 0 3 0   SAB IAB Ectopic Multiple Live Births   3 0 0 0 0      # Outcome Date GA Lbr Levi/2nd Weight Sex Delivery Anes PTL Lv   5 Term 2000     -SEC      4 Term 1998     -SEC      3 SAB            2 SAB            1 SAB              Past Medical History:   Diagnosis Date    Ankle sprain     Anxiety     Bipolar 1 disorder (H)     Depression      Past Surgical History:   Procedure Laterality Date    ABDOMEN SURGERY      c section    ARTHROSCOPY ANKLE, OPEN REPAIR TENDON, COMBINED Left 2019    Procedure: 1.  Ankle arthroscopy with extensive synovectomy, left lower extremity.  2.  Peroneus brevis debridement, repair, left lower extremity.  3.  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  4.  Open reduction and internal fixation, left distal fibular fracture nonunion.  5.  Stressing of left ankle under anesthesia.   ;  Surgeon: Linus Chiu DPM;  Location:  OR     SECTION  1998     SECTION  2000    CYSTOSCOPY N/A 2019    Procedure: DIAGNOSTIC CYSTOSCOPY;  Surgeon: Akash Acosta MD;  Location:  OR    HERNIA REPAIR      LAPAROSCOPIC HYSTERECTOMY SUPRACERVICAL N/A 2019    Procedure: LAPAROSCOPIC SUPRACERVICAL HYSTERECTOMY;  Surgeon: Akash Acosta MD;  Location:  OR    LAPAROSCOPIC SALPINGECTOMY Bilateral 2019    Procedure:  LAPAROSCOPIC BILATERAL SALPINGECTOMY;  Surgeon: Akash Acosta MD;  Location: [facility] OR    OPEN REDUCTION INTERNAL FIXATION ANKLE Left 4/17/2019    Procedure: Open reduction internal fixation ankle;  Surgeon: Linus Chiu DPM;  Location: [facility] OR    TUBAL LIGATION  2000     Family History   Problem Relation Age of Onset    Uterine Cancer Mother     Mental Illness Mother         Bipolar disorder    Family History Negative Father      Social History     Socioeconomic History    Marital status:    Tobacco Use    Smoking status: Every Day     Packs/day: 1.00     Years: 25.00     Pack years: 25.00     Types: Cigarettes    Smokeless tobacco: Never   Vaping Use    Vaping Use: Former   Substance and Sexual Activity    Alcohol use: Yes     Comment: 2 drinks per month    Drug use: Not Currently    Sexual activity: Yes     Partners: Male     Birth control/protection: Female Surgical     Comment: Bilateral Salpingectomy/Supracervical Hysterectomy 11/00   Other Topics Concern    Parent/sibling w/ CABG, MI or angioplasty before 65F 55M? No       Current Outpatient Medications:     Acetaminophen (TYLENOL PO), , Disp: , Rfl:     ASPIRIN PO, , Disp: , Rfl:     IBUPROFEN PO, , Disp: , Rfl:     OLANZapine (ZYPREXA) 5 MG tablet, Take 1 tablet (5 mg) by mouth nightly as needed (insomnia), Disp: 30 tablet, Rfl: 0    Current Facility-Administered Medications:     lidocaine 1 % injection 0.5 mL, 0.5 mL, , , Linus Chiu DPM, 0.5 mL at 02/08/21 1359    lidocaine 1 % injection 0.5 mL, 0.5 mL, , , Linus Chiu, DPM, 0.5 mL at 02/08/21 1359    lidocaine 1 % injection 0.5 mL, 0.5 mL, , , Liuns Chiu, DPM, 0.5 mL at 01/18/21 1438    lidocaine 1 % injection 0.5 mL, 0.5 mL, , , Linus Chiu DPM, 0.5 mL at 01/18/21 1438    triamcinolone (KENALOG-40) injection 40 mg, 40 mg, , , Linus Chiu DPM, 40 mg at 02/08/21 1359    triamcinolone (KENALOG-40) injection 40 mg, 40 mg, , , Linus Chiu  "DPM, 40 mg at 02/08/21 1359    triamcinolone (KENALOG-40) injection 40 mg, 40 mg, , , Aram Linus, DPM, 40 mg at 01/18/21 1438    triamcinolone (KENALOG-40) injection 40 mg, 40 mg, , , Heber Chiuemy, DPM, 40 mg at 01/18/21 1438     Allergies   Allergen Reactions    No Known Allergies        Past medical, surgical, social and family history were reviewed and updated in EPIC.    ROS:   12 point review of systems negative other than symptoms noted below or in the HPI.    PHYSICAL EXAM:  LMP 10/17/2019    BMI: There is no height or weight on file to calculate BMI.  Constitutional: healthy, alert and no distress  Neck: symmetrical, thyroid normal size, no masses present, no lymphadenopathy present.   Cardiovascular: RRR, no murmurs present  Respiratory: breathing unlabored, lungs CTA bilaterally  Breast:normal without masses, tenderness or nipple discharge and no palpable axillary masses or adenopathy  Gastrointestinal: abdomen soft, non-tender, bowel sounds present  PELVIC EXAM:  Vulva: No lesions, no adenopathy, BUS WNL  Vagina: Moist, pink, discharge {mic9:294576}  well rugated, no lesions  Cervix:smooth, pink, no visible lesions  Uterus: Normal size, non-tender, mobile  Ovaries: No masses palpated  Rectal exam: deferred    ASSESSMENT/PLAN:  No diagnosis found.  No results found for any visits on 09/21/23.      COUNSELING:   {FEMALE COUNSELING MESSAGES:871963::\"Reviewed preventive health counseling, as reflected in patient instructions\"}   reports that she has been smoking cigarettes. She has a 25.00 pack-year smoking history. She has never used smokeless tobacco.  {Tobacco Cessation -- Delete if patient is a non-smoker:663700}    {2021 E&M time (Optional):310331}      ***  "

## 2023-09-20 PROBLEM — N92.0 MENORRHAGIA: Status: RESOLVED | Noted: 2019-11-04 | Resolved: 2023-09-20

## 2023-09-20 RX ORDER — AMOXICILLIN 500 MG/1
CAPSULE ORAL
COMMUNITY
Start: 2023-04-27 | End: 2023-09-21

## 2023-09-21 ENCOUNTER — OFFICE VISIT (OUTPATIENT)
Dept: MIDWIFE SERVICES | Facility: CLINIC | Age: 43
End: 2023-09-21
Payer: COMMERCIAL

## 2023-09-21 VITALS
WEIGHT: 290.8 LBS | HEIGHT: 64 IN | DIASTOLIC BLOOD PRESSURE: 60 MMHG | SYSTOLIC BLOOD PRESSURE: 120 MMHG | BODY MASS INDEX: 49.64 KG/M2

## 2023-09-21 DIAGNOSIS — Z71.89 COUNSELING AND COORDINATION OF CARE: Primary | ICD-10-CM

## 2023-09-21 NOTE — PROGRESS NOTES
Patient was very upset that she cannot figure out who, when or why she needs to be seen. Was alerted she needed to have an annual but is up to date according to the ob department.  Primary care provider said he wouldn't see her for an annual.  He does not do breast and pelvic exams.  Is very frustrated and needs a care plan.  Spent 15 minutes discussing an annual exam, what it entails and who can help coordinate her care.  She needs to see a primary care or internal medicine physician who is able to manage her routine needs as well as her mental health, weight management needs.  Resources given. Will schedule.

## 2023-09-26 DIAGNOSIS — F31.81 BIPOLAR 2 DISORDER (H): ICD-10-CM

## 2023-09-27 RX ORDER — OLANZAPINE 5 MG/1
TABLET ORAL
Qty: 30 TABLET | Refills: 0 | OUTPATIENT
Start: 2023-09-27

## 2023-09-27 NOTE — TELEPHONE ENCOUNTER
"Requested Prescriptions   Pending Prescriptions Disp Refills    OLANZapine (ZYPREXA) 5 MG tablet [Pharmacy Med Name: OLANZAPINE 5MG TABS] 30 tablet 0     Sig: TAKE ONE TABLET BY MOUTH AT BEDTIME AS NEEDED FOR INSOMNIA       Antipsychotic Medications Failed - 9/26/2023  7:54 PM        Failed - Lipid panel on file within the past 12 months     Recent Labs   Lab Test 08/09/19  1342   CHOL 145   TRIG 83   HDL 49*   LDL 79   NHDL 96               Failed - A1c or Glucose on file in past 12 months     Recent Labs   Lab Test 10/06/22  0817   GLC 88       Please review patients last 3 weights. If a weight gain of >10 lbs exists, you may refill the prescription once after instructing the patient to schedule an appointment within the next 30 days.    Wt Readings from Last 3 Encounters:   09/21/23 131.9 kg (290 lb 12.8 oz)   11/09/22 118.8 kg (262 lb)   10/06/22 114.2 kg (251 lb 12.8 oz)             Failed - Recent (6 mo) or future (30 days) visit within the authorizing provider's specialty     Patient had office visit in the last 6 months or has a visit in the next 30 days with authorizing provider or within the authorizing provider's specialty.  See \"Patient Info\" tab in inbasket, or \"Choose Columns\" in Meds & Orders section of the refill encounter.            Passed - Blood pressure under 140/90 in past 12 months     BP Readings from Last 3 Encounters:   09/21/23 120/60   11/09/22 134/86   10/06/22 129/87                 Passed - Heart Rate on file within past 12 months     Pulse Readings from Last 3 Encounters:   11/09/22 78   10/06/22 64   01/13/21 64               Passed - Medication is active on med list        Passed - Patient is 18 years of age or older        Passed - Patient is not pregnant        Passed - No positve pregnancy test on file in past 12 months           Last Written Prescription Date:  7/18/23  Last Fill Quantity: 30,  # refills: 0   Last office visit: 7/21/2022 ; last virtual visit: Visit date not " found with prescribing provider:  Lilly Calderon Office Visit:  NONE    Pt due for annual, no appt scheduled. Pt already received one month extension. Rx denied.   Luz Jones RN on 9/27/2023 at 6:01 AM

## 2023-10-28 ENCOUNTER — HEALTH MAINTENANCE LETTER (OUTPATIENT)
Age: 43
End: 2023-10-28

## 2024-10-12 ENCOUNTER — HEALTH MAINTENANCE LETTER (OUTPATIENT)
Age: 44
End: 2024-10-12

## 2024-12-21 ENCOUNTER — HEALTH MAINTENANCE LETTER (OUTPATIENT)
Age: 44
End: 2024-12-21

## (undated) DEVICE — SOL RINGERS LACTATED 1000ML BAG 2B2324X

## (undated) DEVICE — BNDG ELASTIC 4"X5YDS UNSTERILE 6611-40

## (undated) DEVICE — NDL 19GA 1.5"

## (undated) DEVICE — BLADE SHAVER ARTHRO 2.9MM CUDA C9951

## (undated) DEVICE — ESU HANDPIECE BIPOLAR THUNDERBEAT FC 5MMX35CM TB-0535FC

## (undated) DEVICE — MANIFOLD NEPTUNE 4 PORT 700-20

## (undated) DEVICE — TUBING IRRIG TUR Y TYPE 96" LF 6543-01

## (undated) DEVICE — PREP CHLORAPREP 26ML TINTED ORANGE  260815

## (undated) DEVICE — LINEN ORTHO ACL PACK 5447

## (undated) DEVICE — PANTIES MESH LG/XLG 2PK 706M2

## (undated) DEVICE — SU VICRYL 3-0 PS-2 27" UND J427H

## (undated) DEVICE — CAST PADDING 4" UNSTERILE 9044

## (undated) DEVICE — PAD CHUX UNDERPAD 23X24" 7136

## (undated) DEVICE — DRILL BIT ARTHREX BB TAK MTP THREADED AR-13226T

## (undated) DEVICE — CAST PLASTER SPLINT XTRA FAST 5X30" 7392

## (undated) DEVICE — EVAC SYSTEM CLEAR FLOW SC082500

## (undated) DEVICE — TUBING IRRIG CYSTO/BLADDER SET 81" LF 2C4040

## (undated) DEVICE — DECANTER BAG 2002S

## (undated) DEVICE — SOL WATER IRRIG 1000ML BOTTLE 2F7114

## (undated) DEVICE — GLOVE PROTEXIS POWDER FREE 7.5 ORTHOPEDIC 2D73ET75

## (undated) DEVICE — KIT BLOOD COLLECTION ARTHREX SERIES I ACP BLD DRAW ABS-10011

## (undated) DEVICE — GLOVE PROTEXIS W/NEU-THERA 8.0  2D73TE80

## (undated) DEVICE — ENDO TROCAR FIRST ENTRY KII FIOS ADV FIX 05X100MM CFF03

## (undated) DEVICE — SU VICRYL 3-0 TIE 12X18" J904T

## (undated) DEVICE — BLADE KNIFE SURG 15 371115

## (undated) DEVICE — CAST PADDING 4" STERILE 9044S

## (undated) DEVICE — LINEN TOWEL PACK X5 5464

## (undated) DEVICE — GLOVE PROTEXIS W/NEU-THERA 6.0  2D73TE60

## (undated) DEVICE — SU PDS II 4-0 PS-2 18" Z496G

## (undated) DEVICE — SUCTION CANISTER MEDIVAC LINER 3000ML W/LID 65651-530

## (undated) DEVICE — CATH TRAY FOLEY SURESTEP 16FR WDRAIN BAG STLK LATEX A300316A

## (undated) DEVICE — SOL WATER 3000ML BAG 7973-08

## (undated) DEVICE — BAG CLEAR TRASH 1.3M 39X33" P4040C

## (undated) DEVICE — GLOVE PROTEXIS W/NEU-THERA 6.5  2D73TE65

## (undated) DEVICE — ESU HOLDER LAP INST DISP PURPLE LONG 330MM H-PRO-330

## (undated) DEVICE — BNDG KLING 4" 2236

## (undated) DEVICE — DRAPE C-ARM MINI 5423

## (undated) DEVICE — ENDO MORCELLATOR OLYMPUS PNEUMOLINER WA90500US

## (undated) DEVICE — LINEN HALF SHEET 5512

## (undated) DEVICE — Device

## (undated) DEVICE — SYR 10ML FINGER CONTROL W/O NDL 309695

## (undated) DEVICE — ESU GROUND PAD ADULT W/CORD E7507

## (undated) DEVICE — SU VICRYL 0 UR-6 27" J603H

## (undated) DEVICE — NDL 18GA 1.5" 305196

## (undated) DEVICE — COVER FOOTSWITCH W/CINCH 20X24" 923267

## (undated) DEVICE — STPL SKIN 35W APPOSE 8886803712

## (undated) DEVICE — SOL NACL 0.9% INJ 1000ML BAG 2B1324X

## (undated) DEVICE — SOL NACL 0.9% INJ 250ML BAG 2B1322Q

## (undated) DEVICE — CAST BUCKET

## (undated) DEVICE — DRAPE POUCH IRR 1016

## (undated) DEVICE — SU VICRYL 3-0 CT-2 27" UND J232H

## (undated) DEVICE — LINEN FULL SHEET 5511

## (undated) DEVICE — MORCELLATOR LAPAROSCOPIC LINA XCISE MOR-1515-6

## (undated) DEVICE — GLOVE PROTEXIS POWDER FREE SMT 7.5  2D72PT75X

## (undated) DEVICE — SU MONOCRYL 4-0 PS-2 18" UND Y496G

## (undated) DEVICE — SUCTION IRR STRYKERFLOW II W/TIP 250-070-520

## (undated) DEVICE — TUBING C02 INSUFFLATION LAP FILTER HEATER 6198

## (undated) DEVICE — SUCTION MANIFOLD NEPTUNE 2 SYS 4 PORT 0702-020-000

## (undated) DEVICE — ENDO TROCAR SLEEVE KII ADV FIXATION 05X100MM CFS02

## (undated) DEVICE — PREP DURAPREP 26ML APL 8630

## (undated) DEVICE — DRSG KERLIX FLUFFS X5

## (undated) DEVICE — PACK LOWER EXTREMITY RIDGES

## (undated) DEVICE — NDL 27GA 1.25" 305136

## (undated) DEVICE — SU FIBERWIRE 2-0 TAPER CUT AR-7220

## (undated) DEVICE — LIGHT HANDLE X2

## (undated) DEVICE — SU VICRYL 4-0 PS-2 18" UND J496H

## (undated) DEVICE — SYSTEM CLEARIFY VISUALIZATION 21-345

## (undated) DEVICE — JELLY LUBRICATING 10ML SYR STERILE

## (undated) DEVICE — SYR 03ML LL W/O NDL 309657

## (undated) DEVICE — TOURNIQUET CUFF 34" STERILE ZIM60-7070-106

## (undated) RX ORDER — FENTANYL CITRATE 50 UG/ML
INJECTION, SOLUTION INTRAMUSCULAR; INTRAVENOUS
Status: DISPENSED
Start: 2019-11-13

## (undated) RX ORDER — OXYCODONE HYDROCHLORIDE 5 MG/1
TABLET ORAL
Status: DISPENSED
Start: 2019-11-13

## (undated) RX ORDER — FENTANYL CITRATE 50 UG/ML
INJECTION, SOLUTION INTRAMUSCULAR; INTRAVENOUS
Status: DISPENSED
Start: 2019-04-17

## (undated) RX ORDER — OXYCODONE HYDROCHLORIDE 5 MG/1
TABLET ORAL
Status: DISPENSED
Start: 2019-04-17

## (undated) RX ORDER — FENTANYL CITRATE 0.05 MG/ML
INJECTION, SOLUTION INTRAMUSCULAR; INTRAVENOUS
Status: DISPENSED
Start: 2019-11-13

## (undated) RX ORDER — PHENAZOPYRIDINE HYDROCHLORIDE 200 MG/1
TABLET, FILM COATED ORAL
Status: DISPENSED
Start: 2019-11-13

## (undated) RX ORDER — KETOROLAC TROMETHAMINE 30 MG/ML
INJECTION, SOLUTION INTRAMUSCULAR; INTRAVENOUS
Status: DISPENSED
Start: 2019-11-13

## (undated) RX ORDER — BUPIVACAINE HYDROCHLORIDE 5 MG/ML
INJECTION, SOLUTION EPIDURAL; INTRACAUDAL
Status: DISPENSED
Start: 2019-11-13

## (undated) RX ORDER — EPINEPHRINE 1 MG/ML
INJECTION, SOLUTION INTRAMUSCULAR; SUBCUTANEOUS
Status: DISPENSED
Start: 2019-11-13

## (undated) RX ORDER — DEXAMETHASONE SODIUM PHOSPHATE 4 MG/ML
INJECTION, SOLUTION INTRA-ARTICULAR; INTRALESIONAL; INTRAMUSCULAR; INTRAVENOUS; SOFT TISSUE
Status: DISPENSED
Start: 2019-04-17

## (undated) RX ORDER — ONDANSETRON 2 MG/ML
INJECTION INTRAMUSCULAR; INTRAVENOUS
Status: DISPENSED
Start: 2019-04-17

## (undated) RX ORDER — PROPOFOL 10 MG/ML
INJECTION, EMULSION INTRAVENOUS
Status: DISPENSED
Start: 2019-11-13

## (undated) RX ORDER — LIDOCAINE HYDROCHLORIDE 20 MG/ML
INJECTION, SOLUTION EPIDURAL; INFILTRATION; INTRACAUDAL; PERINEURAL
Status: DISPENSED
Start: 2019-11-13

## (undated) RX ORDER — NEOSTIGMINE METHYLSULFATE 1 MG/ML
VIAL (ML) INJECTION
Status: DISPENSED
Start: 2019-04-17

## (undated) RX ORDER — ACETAMINOPHEN 325 MG/1
TABLET ORAL
Status: DISPENSED
Start: 2019-11-13

## (undated) RX ORDER — HYDROMORPHONE HYDROCHLORIDE 1 MG/ML
INJECTION, SOLUTION INTRAMUSCULAR; INTRAVENOUS; SUBCUTANEOUS
Status: DISPENSED
Start: 2019-11-13

## (undated) RX ORDER — HYDROMORPHONE HYDROCHLORIDE 1 MG/ML
INJECTION, SOLUTION INTRAMUSCULAR; INTRAVENOUS; SUBCUTANEOUS
Status: DISPENSED
Start: 2019-04-17

## (undated) RX ORDER — ALBUTEROL SULFATE 90 UG/1
AEROSOL, METERED RESPIRATORY (INHALATION)
Status: DISPENSED
Start: 2019-11-13

## (undated) RX ORDER — CEFAZOLIN SODIUM IN 0.9 % NACL 3 G/100 ML
INTRAVENOUS SOLUTION, PIGGYBACK (ML) INTRAVENOUS
Status: DISPENSED
Start: 2019-11-13

## (undated) RX ORDER — PROPOFOL 10 MG/ML
INJECTION, EMULSION INTRAVENOUS
Status: DISPENSED
Start: 2019-04-17

## (undated) RX ORDER — GLYCOPYRROLATE 0.2 MG/ML
INJECTION INTRAMUSCULAR; INTRAVENOUS
Status: DISPENSED
Start: 2019-04-17

## (undated) RX ORDER — GLYCOPYRROLATE 0.2 MG/ML
INJECTION, SOLUTION INTRAMUSCULAR; INTRAVENOUS
Status: DISPENSED
Start: 2019-11-13

## (undated) RX ORDER — ONDANSETRON 2 MG/ML
INJECTION INTRAMUSCULAR; INTRAVENOUS
Status: DISPENSED
Start: 2019-11-13

## (undated) RX ORDER — LIDOCAINE HYDROCHLORIDE 10 MG/ML
INJECTION, SOLUTION EPIDURAL; INFILTRATION; INTRACAUDAL; PERINEURAL
Status: DISPENSED
Start: 2019-04-17

## (undated) RX ORDER — PHENYLEPHRINE HCL IN 0.9% NACL 1 MG/10 ML
SYRINGE (ML) INTRAVENOUS
Status: DISPENSED
Start: 2019-04-17

## (undated) RX ORDER — CEFAZOLIN SODIUM 2 G/100ML
INJECTION, SOLUTION INTRAVENOUS
Status: DISPENSED
Start: 2019-04-17

## (undated) RX ORDER — DEXAMETHASONE SODIUM PHOSPHATE 4 MG/ML
INJECTION, SOLUTION INTRA-ARTICULAR; INTRALESIONAL; INTRAMUSCULAR; INTRAVENOUS; SOFT TISSUE
Status: DISPENSED
Start: 2019-11-13

## (undated) RX ORDER — VASOPRESSIN 20 U/ML
INJECTION PARENTERAL
Status: DISPENSED
Start: 2019-11-13